# Patient Record
Sex: FEMALE | Race: BLACK OR AFRICAN AMERICAN | NOT HISPANIC OR LATINO | Employment: UNEMPLOYED | URBAN - METROPOLITAN AREA
[De-identification: names, ages, dates, MRNs, and addresses within clinical notes are randomized per-mention and may not be internally consistent; named-entity substitution may affect disease eponyms.]

---

## 2023-03-07 LAB
ALANINE AMINOTRANSFERASE (SGPT) (U/L) IN SER/PLAS: 11 U/L (ref 7–45)
ALBUMIN (G/DL) IN SER/PLAS: 3.9 G/DL (ref 3.4–5)
ALKALINE PHOSPHATASE (U/L) IN SER/PLAS: 75 U/L (ref 33–136)
ANION GAP IN SER/PLAS: 12 MMOL/L (ref 10–20)
ASPARTATE AMINOTRANSFERASE (SGOT) (U/L) IN SER/PLAS: 10 U/L (ref 9–39)
BASOPHILS (10*3/UL) IN BLOOD BY AUTOMATED COUNT: 0.01 X10E9/L (ref 0–0.1)
BASOPHILS/100 LEUKOCYTES IN BLOOD BY AUTOMATED COUNT: 0.2 % (ref 0–2)
BILIRUBIN TOTAL (MG/DL) IN SER/PLAS: 0.3 MG/DL (ref 0–1.2)
CALCIUM (MG/DL) IN SER/PLAS: 9.6 MG/DL (ref 8.6–10.6)
CANCER AG 125 (U/ML) IN SERUM: 3.9 U/ML (ref 0–30.2)
CARBON DIOXIDE, TOTAL (MMOL/L) IN SER/PLAS: 25 MMOL/L (ref 21–32)
CHLORIDE (MMOL/L) IN SER/PLAS: 109 MMOL/L (ref 98–107)
CREATININE (MG/DL) IN SER/PLAS: 1.02 MG/DL (ref 0.5–1.05)
EOSINOPHILS (10*3/UL) IN BLOOD BY AUTOMATED COUNT: 0.1 X10E9/L (ref 0–0.7)
EOSINOPHILS/100 LEUKOCYTES IN BLOOD BY AUTOMATED COUNT: 1.6 % (ref 0–6)
ERYTHROCYTE DISTRIBUTION WIDTH (RATIO) BY AUTOMATED COUNT: 17 % (ref 11.5–14.5)
ERYTHROCYTE MEAN CORPUSCULAR HEMOGLOBIN CONCENTRATION (G/DL) BY AUTOMATED: 32.1 G/DL (ref 32–36)
ERYTHROCYTE MEAN CORPUSCULAR VOLUME (FL) BY AUTOMATED COUNT: 93 FL (ref 80–100)
ERYTHROCYTES (10*6/UL) IN BLOOD BY AUTOMATED COUNT: 3.34 X10E12/L (ref 4–5.2)
GFR FEMALE: 62 ML/MIN/1.73M2
GLUCOSE (MG/DL) IN SER/PLAS: 140 MG/DL (ref 74–99)
HEMATOCRIT (%) IN BLOOD BY AUTOMATED COUNT: 31.2 % (ref 36–46)
HEMOGLOBIN (G/DL) IN BLOOD: 10 G/DL (ref 12–16)
IMMATURE GRANULOCYTES/100 LEUKOCYTES IN BLOOD BY AUTOMATED COUNT: 0.5 % (ref 0–0.9)
LEUKOCYTES (10*3/UL) IN BLOOD BY AUTOMATED COUNT: 6.2 X10E9/L (ref 4.4–11.3)
LYMPHOCYTES (10*3/UL) IN BLOOD BY AUTOMATED COUNT: 2.03 X10E9/L (ref 1.2–4.8)
LYMPHOCYTES/100 LEUKOCYTES IN BLOOD BY AUTOMATED COUNT: 32.6 % (ref 13–44)
MAGNESIUM (MG/DL) IN SER/PLAS: 1.8 MG/DL (ref 1.6–2.4)
MONOCYTES (10*3/UL) IN BLOOD BY AUTOMATED COUNT: 0.39 X10E9/L (ref 0.1–1)
MONOCYTES/100 LEUKOCYTES IN BLOOD BY AUTOMATED COUNT: 6.3 % (ref 2–10)
NEUTROPHILS (10*3/UL) IN BLOOD BY AUTOMATED COUNT: 3.67 X10E9/L (ref 1.2–7.7)
NEUTROPHILS/100 LEUKOCYTES IN BLOOD BY AUTOMATED COUNT: 58.8 % (ref 40–80)
NRBC (PER 100 WBCS) BY AUTOMATED COUNT: 0 /100 WBC (ref 0–0)
PLATELETS (10*3/UL) IN BLOOD AUTOMATED COUNT: 240 X10E9/L (ref 150–450)
POTASSIUM (MMOL/L) IN SER/PLAS: 4.1 MMOL/L (ref 3.5–5.3)
PROTEIN TOTAL: 6.8 G/DL (ref 6.4–8.2)
SODIUM (MMOL/L) IN SER/PLAS: 142 MMOL/L (ref 136–145)
UREA NITROGEN (MG/DL) IN SER/PLAS: 15 MG/DL (ref 6–23)

## 2023-04-04 LAB
ALANINE AMINOTRANSFERASE (SGPT) (U/L) IN SER/PLAS: 7 U/L (ref 7–45)
ALBUMIN (G/DL) IN SER/PLAS: 4 G/DL (ref 3.4–5)
ALKALINE PHOSPHATASE (U/L) IN SER/PLAS: 71 U/L (ref 33–136)
ANION GAP IN SER/PLAS: 12 MMOL/L (ref 10–20)
ASPARTATE AMINOTRANSFERASE (SGOT) (U/L) IN SER/PLAS: 8 U/L (ref 9–39)
BASOPHILS (10*3/UL) IN BLOOD BY AUTOMATED COUNT: 0.02 X10E9/L (ref 0–0.1)
BASOPHILS/100 LEUKOCYTES IN BLOOD BY AUTOMATED COUNT: 0.4 % (ref 0–2)
BILIRUBIN TOTAL (MG/DL) IN SER/PLAS: 0.4 MG/DL (ref 0–1.2)
CALCIUM (MG/DL) IN SER/PLAS: 9.1 MG/DL (ref 8.6–10.6)
CANCER AG 125 (U/ML) IN SERUM: 5.2 U/ML (ref 0–30.2)
CARBON DIOXIDE, TOTAL (MMOL/L) IN SER/PLAS: 26 MMOL/L (ref 21–32)
CHLORIDE (MMOL/L) IN SER/PLAS: 105 MMOL/L (ref 98–107)
CHOLESTEROL (MG/DL) IN SER/PLAS: 134 MG/DL (ref 0–199)
CHOLESTEROL IN HDL (MG/DL) IN SER/PLAS: 30.6 MG/DL
CHOLESTEROL/HDL RATIO: 4.4
CREATININE (MG/DL) IN SER/PLAS: 0.93 MG/DL (ref 0.5–1.05)
EOSINOPHILS (10*3/UL) IN BLOOD BY AUTOMATED COUNT: 0.06 X10E9/L (ref 0–0.7)
EOSINOPHILS/100 LEUKOCYTES IN BLOOD BY AUTOMATED COUNT: 1.2 % (ref 0–6)
ERYTHROCYTE DISTRIBUTION WIDTH (RATIO) BY AUTOMATED COUNT: 17.1 % (ref 11.5–14.5)
ERYTHROCYTE MEAN CORPUSCULAR HEMOGLOBIN CONCENTRATION (G/DL) BY AUTOMATED: 31.1 G/DL (ref 32–36)
ERYTHROCYTE MEAN CORPUSCULAR VOLUME (FL) BY AUTOMATED COUNT: 94 FL (ref 80–100)
ERYTHROCYTES (10*6/UL) IN BLOOD BY AUTOMATED COUNT: 3.56 X10E12/L (ref 4–5.2)
ESTIMATED AVERAGE GLUCOSE FOR HBA1C: 137 MG/DL
GFR FEMALE: 69 ML/MIN/1.73M2
GLUCOSE (MG/DL) IN SER/PLAS: 111 MG/DL (ref 74–99)
HEMATOCRIT (%) IN BLOOD BY AUTOMATED COUNT: 33.4 % (ref 36–46)
HEMOGLOBIN (G/DL) IN BLOOD: 10.4 G/DL (ref 12–16)
HEMOGLOBIN A1C/HEMOGLOBIN TOTAL IN BLOOD: 6.4 %
IMMATURE GRANULOCYTES/100 LEUKOCYTES IN BLOOD BY AUTOMATED COUNT: 0.6 % (ref 0–0.9)
LDL: 61 MG/DL (ref 0–99)
LEUKOCYTES (10*3/UL) IN BLOOD BY AUTOMATED COUNT: 5.2 X10E9/L (ref 4.4–11.3)
LYMPHOCYTES (10*3/UL) IN BLOOD BY AUTOMATED COUNT: 1.65 X10E9/L (ref 1.2–4.8)
LYMPHOCYTES/100 LEUKOCYTES IN BLOOD BY AUTOMATED COUNT: 31.8 % (ref 13–44)
MAGNESIUM (MG/DL) IN SER/PLAS: 1.91 MG/DL (ref 1.6–2.4)
MONOCYTES (10*3/UL) IN BLOOD BY AUTOMATED COUNT: 0.3 X10E9/L (ref 0.1–1)
MONOCYTES/100 LEUKOCYTES IN BLOOD BY AUTOMATED COUNT: 5.8 % (ref 2–10)
NEUTROPHILS (10*3/UL) IN BLOOD BY AUTOMATED COUNT: 3.13 X10E9/L (ref 1.2–7.7)
NEUTROPHILS/100 LEUKOCYTES IN BLOOD BY AUTOMATED COUNT: 60.2 % (ref 40–80)
NON HDL CHOLESTEROL: 103 MG/DL
NRBC (PER 100 WBCS) BY AUTOMATED COUNT: 0 /100 WBC (ref 0–0)
PLATELETS (10*3/UL) IN BLOOD AUTOMATED COUNT: 253 X10E9/L (ref 150–450)
POTASSIUM (MMOL/L) IN SER/PLAS: 4.3 MMOL/L (ref 3.5–5.3)
PROTEIN TOTAL: 6.8 G/DL (ref 6.4–8.2)
SODIUM (MMOL/L) IN SER/PLAS: 139 MMOL/L (ref 136–145)
TRIGLYCERIDE (MG/DL) IN SER/PLAS: 213 MG/DL (ref 0–149)
UREA NITROGEN (MG/DL) IN SER/PLAS: 7 MG/DL (ref 6–23)
VLDL: 43 MG/DL (ref 0–40)

## 2023-05-03 LAB
ALANINE AMINOTRANSFERASE (SGPT) (U/L) IN SER/PLAS: 7 U/L (ref 7–45)
ALBUMIN (G/DL) IN SER/PLAS: 4.2 G/DL (ref 3.4–5)
ALKALINE PHOSPHATASE (U/L) IN SER/PLAS: 73 U/L (ref 33–136)
ANION GAP IN SER/PLAS: 12 MMOL/L (ref 10–20)
ASPARTATE AMINOTRANSFERASE (SGOT) (U/L) IN SER/PLAS: 10 U/L (ref 9–39)
BASOPHILS (10*3/UL) IN BLOOD BY AUTOMATED COUNT: 0.01 X10E9/L (ref 0–0.1)
BASOPHILS/100 LEUKOCYTES IN BLOOD BY AUTOMATED COUNT: 0.2 % (ref 0–2)
BILIRUBIN TOTAL (MG/DL) IN SER/PLAS: 0.4 MG/DL (ref 0–1.2)
CALCIUM (MG/DL) IN SER/PLAS: 9.8 MG/DL (ref 8.6–10.6)
CANCER AG 125 (U/ML) IN SERUM: 4.1 U/ML (ref 0–30.2)
CARBON DIOXIDE, TOTAL (MMOL/L) IN SER/PLAS: 25 MMOL/L (ref 21–32)
CHLORIDE (MMOL/L) IN SER/PLAS: 108 MMOL/L (ref 98–107)
CREATININE (MG/DL) IN SER/PLAS: 0.95 MG/DL (ref 0.5–1.05)
EOSINOPHILS (10*3/UL) IN BLOOD BY AUTOMATED COUNT: 0.07 X10E9/L (ref 0–0.7)
EOSINOPHILS/100 LEUKOCYTES IN BLOOD BY AUTOMATED COUNT: 1.3 % (ref 0–6)
ERYTHROCYTE DISTRIBUTION WIDTH (RATIO) BY AUTOMATED COUNT: 16.7 % (ref 11.5–14.5)
ERYTHROCYTE MEAN CORPUSCULAR HEMOGLOBIN CONCENTRATION (G/DL) BY AUTOMATED: 32.3 G/DL (ref 32–36)
ERYTHROCYTE MEAN CORPUSCULAR VOLUME (FL) BY AUTOMATED COUNT: 91 FL (ref 80–100)
ERYTHROCYTES (10*6/UL) IN BLOOD BY AUTOMATED COUNT: 3.6 X10E12/L (ref 4–5.2)
GFR FEMALE: 67 ML/MIN/1.73M2
GLUCOSE (MG/DL) IN SER/PLAS: 128 MG/DL (ref 74–99)
HEMATOCRIT (%) IN BLOOD BY AUTOMATED COUNT: 32.8 % (ref 36–46)
HEMOGLOBIN (G/DL) IN BLOOD: 10.6 G/DL (ref 12–16)
IMMATURE GRANULOCYTES/100 LEUKOCYTES IN BLOOD BY AUTOMATED COUNT: 0.4 % (ref 0–0.9)
LEUKOCYTES (10*3/UL) IN BLOOD BY AUTOMATED COUNT: 5.3 X10E9/L (ref 4.4–11.3)
LYMPHOCYTES (10*3/UL) IN BLOOD BY AUTOMATED COUNT: 1.64 X10E9/L (ref 1.2–4.8)
LYMPHOCYTES/100 LEUKOCYTES IN BLOOD BY AUTOMATED COUNT: 31.2 % (ref 13–44)
MAGNESIUM (MG/DL) IN SER/PLAS: 2.04 MG/DL (ref 1.6–2.4)
MONOCYTES (10*3/UL) IN BLOOD BY AUTOMATED COUNT: 0.28 X10E9/L (ref 0.1–1)
MONOCYTES/100 LEUKOCYTES IN BLOOD BY AUTOMATED COUNT: 5.3 % (ref 2–10)
NEUTROPHILS (10*3/UL) IN BLOOD BY AUTOMATED COUNT: 3.24 X10E9/L (ref 1.2–7.7)
NEUTROPHILS/100 LEUKOCYTES IN BLOOD BY AUTOMATED COUNT: 61.6 % (ref 40–80)
NRBC (PER 100 WBCS) BY AUTOMATED COUNT: 0 /100 WBC (ref 0–0)
PLATELETS (10*3/UL) IN BLOOD AUTOMATED COUNT: 261 X10E9/L (ref 150–450)
POTASSIUM (MMOL/L) IN SER/PLAS: 4.3 MMOL/L (ref 3.5–5.3)
PROTEIN TOTAL: 7.2 G/DL (ref 6.4–8.2)
SODIUM (MMOL/L) IN SER/PLAS: 141 MMOL/L (ref 136–145)
UREA NITROGEN (MG/DL) IN SER/PLAS: 12 MG/DL (ref 6–23)

## 2023-05-30 LAB
ALANINE AMINOTRANSFERASE (SGPT) (U/L) IN SER/PLAS: 8 U/L (ref 7–45)
ALBUMIN (G/DL) IN SER/PLAS: 4.2 G/DL (ref 3.4–5)
ALKALINE PHOSPHATASE (U/L) IN SER/PLAS: 75 U/L (ref 33–136)
ANION GAP IN SER/PLAS: 7 MMOL/L (ref 10–20)
ASPARTATE AMINOTRANSFERASE (SGOT) (U/L) IN SER/PLAS: 11 U/L (ref 9–39)
BASOPHILS (10*3/UL) IN BLOOD BY AUTOMATED COUNT: 0.02 X10E9/L (ref 0–0.1)
BASOPHILS/100 LEUKOCYTES IN BLOOD BY AUTOMATED COUNT: 0.4 % (ref 0–2)
BILIRUBIN TOTAL (MG/DL) IN SER/PLAS: 0.4 MG/DL (ref 0–1.2)
CALCIUM (MG/DL) IN SER/PLAS: 9.8 MG/DL (ref 8.6–10.6)
CANCER AG 125 (U/ML) IN SERUM: 5.6 U/ML (ref 0–30.2)
CARBON DIOXIDE, TOTAL (MMOL/L) IN SER/PLAS: 27 MMOL/L (ref 21–32)
CHLORIDE (MMOL/L) IN SER/PLAS: 109 MMOL/L (ref 98–107)
CREATININE (MG/DL) IN SER/PLAS: 0.95 MG/DL (ref 0.5–1.05)
EOSINOPHILS (10*3/UL) IN BLOOD BY AUTOMATED COUNT: 0.08 X10E9/L (ref 0–0.7)
EOSINOPHILS/100 LEUKOCYTES IN BLOOD BY AUTOMATED COUNT: 1.4 % (ref 0–6)
ERYTHROCYTE DISTRIBUTION WIDTH (RATIO) BY AUTOMATED COUNT: 16.9 % (ref 11.5–14.5)
ERYTHROCYTE MEAN CORPUSCULAR HEMOGLOBIN CONCENTRATION (G/DL) BY AUTOMATED: 32.6 G/DL (ref 32–36)
ERYTHROCYTE MEAN CORPUSCULAR VOLUME (FL) BY AUTOMATED COUNT: 90 FL (ref 80–100)
ERYTHROCYTES (10*6/UL) IN BLOOD BY AUTOMATED COUNT: 3.45 X10E12/L (ref 4–5.2)
GFR FEMALE: 67 ML/MIN/1.73M2
GLUCOSE (MG/DL) IN SER/PLAS: 114 MG/DL (ref 74–99)
HEMATOCRIT (%) IN BLOOD BY AUTOMATED COUNT: 31 % (ref 36–46)
HEMOGLOBIN (G/DL) IN BLOOD: 10.1 G/DL (ref 12–16)
IMMATURE GRANULOCYTES/100 LEUKOCYTES IN BLOOD BY AUTOMATED COUNT: 0.4 % (ref 0–0.9)
LEUKOCYTES (10*3/UL) IN BLOOD BY AUTOMATED COUNT: 5.6 X10E9/L (ref 4.4–11.3)
LYMPHOCYTES (10*3/UL) IN BLOOD BY AUTOMATED COUNT: 2.49 X10E9/L (ref 1.2–4.8)
LYMPHOCYTES/100 LEUKOCYTES IN BLOOD BY AUTOMATED COUNT: 44.4 % (ref 13–44)
MAGNESIUM (MG/DL) IN SER/PLAS: 1.81 MG/DL (ref 1.6–2.4)
MONOCYTES (10*3/UL) IN BLOOD BY AUTOMATED COUNT: 0.4 X10E9/L (ref 0.1–1)
MONOCYTES/100 LEUKOCYTES IN BLOOD BY AUTOMATED COUNT: 7.1 % (ref 2–10)
NEUTROPHILS (10*3/UL) IN BLOOD BY AUTOMATED COUNT: 2.6 X10E9/L (ref 1.2–7.7)
NEUTROPHILS/100 LEUKOCYTES IN BLOOD BY AUTOMATED COUNT: 46.3 % (ref 40–80)
NRBC (PER 100 WBCS) BY AUTOMATED COUNT: 0 /100 WBC (ref 0–0)
PLATELETS (10*3/UL) IN BLOOD AUTOMATED COUNT: 261 X10E9/L (ref 150–450)
POTASSIUM (MMOL/L) IN SER/PLAS: 3.8 MMOL/L (ref 3.5–5.3)
PROTEIN TOTAL: 7.1 G/DL (ref 6.4–8.2)
SODIUM (MMOL/L) IN SER/PLAS: 139 MMOL/L (ref 136–145)
UREA NITROGEN (MG/DL) IN SER/PLAS: 14 MG/DL (ref 6–23)

## 2023-06-27 LAB
ALANINE AMINOTRANSFERASE (SGPT) (U/L) IN SER/PLAS: 12 U/L (ref 7–45)
ALBUMIN (G/DL) IN SER/PLAS: 4 G/DL (ref 3.4–5)
ALKALINE PHOSPHATASE (U/L) IN SER/PLAS: 71 U/L (ref 33–136)
ANION GAP IN SER/PLAS: 13 MMOL/L (ref 10–20)
ASPARTATE AMINOTRANSFERASE (SGOT) (U/L) IN SER/PLAS: 14 U/L (ref 9–39)
BASOPHILS (10*3/UL) IN BLOOD BY AUTOMATED COUNT: 0.03 X10E9/L (ref 0–0.1)
BASOPHILS/100 LEUKOCYTES IN BLOOD BY AUTOMATED COUNT: 0.6 % (ref 0–2)
BILIRUBIN TOTAL (MG/DL) IN SER/PLAS: 0.4 MG/DL (ref 0–1.2)
CALCIUM (MG/DL) IN SER/PLAS: 9.6 MG/DL (ref 8.6–10.6)
CANCER AG 125 (U/ML) IN SERUM: 6 U/ML (ref 0–30.2)
CARBON DIOXIDE, TOTAL (MMOL/L) IN SER/PLAS: 25 MMOL/L (ref 21–32)
CHLORIDE (MMOL/L) IN SER/PLAS: 108 MMOL/L (ref 98–107)
CREATININE (MG/DL) IN SER/PLAS: 0.91 MG/DL (ref 0.5–1.05)
EOSINOPHILS (10*3/UL) IN BLOOD BY AUTOMATED COUNT: 0.09 X10E9/L (ref 0–0.7)
EOSINOPHILS/100 LEUKOCYTES IN BLOOD BY AUTOMATED COUNT: 1.8 % (ref 0–6)
ERYTHROCYTE DISTRIBUTION WIDTH (RATIO) BY AUTOMATED COUNT: 17.4 % (ref 11.5–14.5)
ERYTHROCYTE MEAN CORPUSCULAR HEMOGLOBIN CONCENTRATION (G/DL) BY AUTOMATED: 31.3 G/DL (ref 32–36)
ERYTHROCYTE MEAN CORPUSCULAR VOLUME (FL) BY AUTOMATED COUNT: 92 FL (ref 80–100)
ERYTHROCYTES (10*6/UL) IN BLOOD BY AUTOMATED COUNT: 3.64 X10E12/L (ref 4–5.2)
GFR FEMALE: 70 ML/MIN/1.73M2
GLUCOSE (MG/DL) IN SER/PLAS: 101 MG/DL (ref 74–99)
HEMATOCRIT (%) IN BLOOD BY AUTOMATED COUNT: 33.5 % (ref 36–46)
HEMOGLOBIN (G/DL) IN BLOOD: 10.5 G/DL (ref 12–16)
IMMATURE GRANULOCYTES/100 LEUKOCYTES IN BLOOD BY AUTOMATED COUNT: 0.4 % (ref 0–0.9)
LEUKOCYTES (10*3/UL) IN BLOOD BY AUTOMATED COUNT: 5 X10E9/L (ref 4.4–11.3)
LYMPHOCYTES (10*3/UL) IN BLOOD BY AUTOMATED COUNT: 1.4 X10E9/L (ref 1.2–4.8)
LYMPHOCYTES/100 LEUKOCYTES IN BLOOD BY AUTOMATED COUNT: 28.1 % (ref 13–44)
MAGNESIUM (MG/DL) IN SER/PLAS: 1.9 MG/DL (ref 1.6–2.4)
MONOCYTES (10*3/UL) IN BLOOD BY AUTOMATED COUNT: 0.42 X10E9/L (ref 0.1–1)
MONOCYTES/100 LEUKOCYTES IN BLOOD BY AUTOMATED COUNT: 8.4 % (ref 2–10)
NEUTROPHILS (10*3/UL) IN BLOOD BY AUTOMATED COUNT: 3.03 X10E9/L (ref 1.2–7.7)
NEUTROPHILS/100 LEUKOCYTES IN BLOOD BY AUTOMATED COUNT: 60.7 % (ref 40–80)
NRBC (PER 100 WBCS) BY AUTOMATED COUNT: 0 /100 WBC (ref 0–0)
PLATELETS (10*3/UL) IN BLOOD AUTOMATED COUNT: 206 X10E9/L (ref 150–450)
POTASSIUM (MMOL/L) IN SER/PLAS: 4.1 MMOL/L (ref 3.5–5.3)
PROTEIN TOTAL: 7.4 G/DL (ref 6.4–8.2)
SODIUM (MMOL/L) IN SER/PLAS: 142 MMOL/L (ref 136–145)
UREA NITROGEN (MG/DL) IN SER/PLAS: 8 MG/DL (ref 6–23)

## 2023-08-25 ENCOUNTER — HOSPITAL ENCOUNTER (OUTPATIENT)
Dept: DATA CONVERSION | Facility: HOSPITAL | Age: 63
Discharge: HOME | End: 2023-08-25

## 2023-08-25 DIAGNOSIS — C56.9 MALIGNANT NEOPLASM OF UNSPECIFIED OVARY (MULTI): ICD-10-CM

## 2023-08-25 DIAGNOSIS — Z51.11 ENCOUNTER FOR ANTINEOPLASTIC CHEMOTHERAPY: ICD-10-CM

## 2023-08-25 LAB
ALBUMIN SERPL-MCNC: 3.6 GM/DL (ref 3.5–5)
ALBUMIN/GLOB SERPL: 1.1 RATIO (ref 1.5–3)
ALP BLD-CCNC: 75 U/L (ref 35–125)
ALT SERPL-CCNC: 10 U/L (ref 5–40)
ANION GAP SERPL CALCULATED.3IONS-SCNC: 12 MMOL/L (ref 0–19)
AST SERPL-CCNC: 11 U/L (ref 5–40)
BASOPHILS # BLD AUTO: 0.02 K/UL (ref 0–0.22)
BASOPHILS NFR BLD AUTO: 0.3 % (ref 0–1)
BILIRUB SERPL-MCNC: 0.2 MG/DL (ref 0.1–1.2)
BUN SERPL-MCNC: 9 MG/DL (ref 8–25)
BUN/CREAT SERPL: 10 RATIO (ref 8–21)
CALCIUM SERPL-MCNC: 9 MG/DL (ref 8.5–10.4)
CANCER AG125 SERPL-ACNC: 8.6 U/ML (ref 0–35)
CHLORIDE SERPL-SCNC: 108 MMOL/L (ref 97–107)
CO2 SERPL-SCNC: 22 MMOL/L (ref 24–31)
CREAT SERPL-MCNC: 0.9 MG/DL (ref 0.4–1.6)
DEPRECATED RDW RBC AUTO: 56.5 FL (ref 37–54)
DIFFERENTIAL METHOD BLD: ABNORMAL
EOSINOPHIL # BLD AUTO: 0.13 K/UL (ref 0–0.45)
EOSINOPHIL NFR BLD: 2.2 % (ref 0–3)
ERYTHROCYTE [DISTWIDTH] IN BLOOD BY AUTOMATED COUNT: 17.9 % (ref 11.7–15)
GFR SERPL CREATININE-BSD FRML MDRD: 71 ML/MIN/1.73 M2
GLOBULIN SER-MCNC: 3.4 G/DL (ref 1.9–3.7)
GLUCOSE SERPL-MCNC: 143 MG/DL (ref 65–99)
HCT VFR BLD AUTO: 30.4 % (ref 36–44)
HGB BLD-MCNC: 9.9 GM/DL (ref 12–15)
IMM GRANULOCYTES # BLD AUTO: 0.03 K/UL (ref 0–0.1)
LYMPHOCYTES # BLD AUTO: 2.42 K/UL (ref 1.2–3.2)
LYMPHOCYTES NFR BLD MANUAL: 40.5 % (ref 20–40)
MAGNESIUM SERPL-MCNC: 1.9 MG/DL (ref 1.6–3.1)
MCH RBC QN AUTO: 28.1 PG (ref 26–34)
MCHC RBC AUTO-ENTMCNC: 32.6 % (ref 31–37)
MCV RBC AUTO: 86.4 FL (ref 80–100)
MONOCYTES # BLD AUTO: 0.37 K/UL (ref 0–0.8)
MONOCYTES NFR BLD MANUAL: 6.2 % (ref 0–8)
NEUTROPHILS # BLD AUTO: 3.01 K/UL
NEUTROPHILS # BLD AUTO: 3.01 K/UL (ref 1.8–7.7)
NEUTROPHILS.IMMATURE NFR BLD: 0.5 % (ref 0–1)
NEUTS SEG NFR BLD: 50.3 % (ref 50–70)
NRBC BLD-RTO: 0 /100 WBC
PLATELET # BLD AUTO: 235 K/UL (ref 150–450)
PMV BLD AUTO: 9.4 CU (ref 7–12.6)
POTASSIUM SERPL-SCNC: 4.1 MMOL/L (ref 3.4–5.1)
PROT SERPL-MCNC: 7 G/DL (ref 5.9–7.9)
RBC # BLD AUTO: 3.52 M/UL (ref 4–4.9)
SODIUM SERPL-SCNC: 142 MMOL/L (ref 133–145)
WBC # BLD AUTO: 6 K/UL (ref 4.5–11)

## 2023-09-27 LAB
ALANINE AMINOTRANSFERASE (SGPT) (U/L) IN SER/PLAS: 8 U/L (ref 7–45)
ALBUMIN (G/DL) IN SER/PLAS: 4 G/DL (ref 3.4–5)
ALKALINE PHOSPHATASE (U/L) IN SER/PLAS: 65 U/L (ref 33–136)
ANION GAP IN SER/PLAS: 8 MMOL/L (ref 10–20)
ASPARTATE AMINOTRANSFERASE (SGOT) (U/L) IN SER/PLAS: 11 U/L (ref 9–39)
BASOPHILS (10*3/UL) IN BLOOD BY AUTOMATED COUNT: 0.01 X10E9/L (ref 0–0.1)
BASOPHILS/100 LEUKOCYTES IN BLOOD BY AUTOMATED COUNT: 0.2 % (ref 0–2)
BILIRUBIN TOTAL (MG/DL) IN SER/PLAS: 0.3 MG/DL (ref 0–1.2)
CALCIUM (MG/DL) IN SER/PLAS: 9.3 MG/DL (ref 8.6–10.6)
CANCER AG 125 (U/ML) IN SERUM: 7.3 U/ML (ref 0–30.2)
CARBON DIOXIDE, TOTAL (MMOL/L) IN SER/PLAS: 29 MMOL/L (ref 21–32)
CHLORIDE (MMOL/L) IN SER/PLAS: 109 MMOL/L (ref 98–107)
CREATININE (MG/DL) IN SER/PLAS: 1.03 MG/DL (ref 0.5–1.05)
EOSINOPHILS (10*3/UL) IN BLOOD BY AUTOMATED COUNT: 0.11 X10E9/L (ref 0–0.7)
EOSINOPHILS/100 LEUKOCYTES IN BLOOD BY AUTOMATED COUNT: 2.2 % (ref 0–6)
ERYTHROCYTE DISTRIBUTION WIDTH (RATIO) BY AUTOMATED COUNT: 18.6 % (ref 11.5–14.5)
ERYTHROCYTE MEAN CORPUSCULAR HEMOGLOBIN CONCENTRATION (G/DL) BY AUTOMATED: 31.3 G/DL (ref 32–36)
ERYTHROCYTE MEAN CORPUSCULAR VOLUME (FL) BY AUTOMATED COUNT: 89 FL (ref 80–100)
ERYTHROCYTES (10*6/UL) IN BLOOD BY AUTOMATED COUNT: 3.76 X10E12/L (ref 4–5.2)
GFR FEMALE: 61 ML/MIN/1.73M2
GLUCOSE (MG/DL) IN SER/PLAS: 151 MG/DL (ref 74–99)
HEMATOCRIT (%) IN BLOOD BY AUTOMATED COUNT: 33.5 % (ref 36–46)
HEMOGLOBIN (G/DL) IN BLOOD: 10.5 G/DL (ref 12–16)
IMMATURE GRANULOCYTES/100 LEUKOCYTES IN BLOOD BY AUTOMATED COUNT: 0.2 % (ref 0–0.9)
LEUKOCYTES (10*3/UL) IN BLOOD BY AUTOMATED COUNT: 4.9 X10E9/L (ref 4.4–11.3)
LYMPHOCYTES (10*3/UL) IN BLOOD BY AUTOMATED COUNT: 1.66 X10E9/L (ref 1.2–4.8)
LYMPHOCYTES/100 LEUKOCYTES IN BLOOD BY AUTOMATED COUNT: 33.9 % (ref 13–44)
MAGNESIUM (MG/DL) IN SER/PLAS: 1.89 MG/DL (ref 1.6–2.4)
MONOCYTES (10*3/UL) IN BLOOD BY AUTOMATED COUNT: 0.29 X10E9/L (ref 0.1–1)
MONOCYTES/100 LEUKOCYTES IN BLOOD BY AUTOMATED COUNT: 5.9 % (ref 2–10)
NEUTROPHILS (10*3/UL) IN BLOOD BY AUTOMATED COUNT: 2.82 X10E9/L (ref 1.2–7.7)
NEUTROPHILS/100 LEUKOCYTES IN BLOOD BY AUTOMATED COUNT: 57.6 % (ref 40–80)
NRBC (PER 100 WBCS) BY AUTOMATED COUNT: 0 /100 WBC (ref 0–0)
PLATELETS (10*3/UL) IN BLOOD AUTOMATED COUNT: 235 X10E9/L (ref 150–450)
POTASSIUM (MMOL/L) IN SER/PLAS: 4.2 MMOL/L (ref 3.5–5.3)
PROTEIN TOTAL: 7 G/DL (ref 6.4–8.2)
SODIUM (MMOL/L) IN SER/PLAS: 142 MMOL/L (ref 136–145)
UREA NITROGEN (MG/DL) IN SER/PLAS: 9 MG/DL (ref 6–23)

## 2023-10-11 PROBLEM — R35.0 URINE FREQUENCY: Status: ACTIVE | Noted: 2023-10-11

## 2023-10-11 PROBLEM — E66.01 MORBID OBESITY WITH BMI OF 40.0-44.9, ADULT (MULTI): Status: ACTIVE | Noted: 2023-10-11

## 2023-10-11 PROBLEM — E83.42 HYPOMAGNESEMIA: Status: ACTIVE | Noted: 2023-10-11

## 2023-10-11 PROBLEM — J44.9 COPD (CHRONIC OBSTRUCTIVE PULMONARY DISEASE) (MULTI): Status: ACTIVE | Noted: 2023-10-11

## 2023-10-11 PROBLEM — F17.200 NICOTINE ADDICTION: Status: ACTIVE | Noted: 2023-10-11

## 2023-10-11 PROBLEM — N39.3 STRESS INCONTINENCE, FEMALE: Status: ACTIVE | Noted: 2023-10-11

## 2023-10-11 PROBLEM — R79.9 ABNORMAL BLOOD CHEMISTRY: Status: ACTIVE | Noted: 2023-10-11

## 2023-10-11 PROBLEM — F32.A DEPRESSION: Status: ACTIVE | Noted: 2023-10-11

## 2023-10-11 PROBLEM — C56.9 MALIGNANT NEOPLASM OF OVARY (MULTI): Status: ACTIVE | Noted: 2023-10-11

## 2023-10-11 PROBLEM — N17.9 AKI (ACUTE KIDNEY INJURY) (CMS-HCC): Status: ACTIVE | Noted: 2023-10-11

## 2023-10-11 PROBLEM — R06.00 DYSPNEA: Status: ACTIVE | Noted: 2023-10-11

## 2023-10-11 PROBLEM — E87.6 HYPOKALEMIA: Status: ACTIVE | Noted: 2023-10-11

## 2023-10-11 PROBLEM — R91.8 LUNG NODULE, MULTIPLE: Status: ACTIVE | Noted: 2023-10-11

## 2023-10-11 PROBLEM — M25.512 BILATERAL SHOULDER PAIN: Status: ACTIVE | Noted: 2023-10-11

## 2023-10-11 PROBLEM — M79.604 RIGHT LEG PAIN: Status: ACTIVE | Noted: 2023-10-11

## 2023-10-11 PROBLEM — M62.89 PELVIC FLOOR DYSFUNCTION: Status: ACTIVE | Noted: 2023-10-11

## 2023-10-11 PROBLEM — R51.9 HEADACHE: Status: ACTIVE | Noted: 2023-10-11

## 2023-10-11 PROBLEM — G47.33 OBSTRUCTIVE SLEEP APNEA SYNDROME IN ADULT: Status: ACTIVE | Noted: 2023-10-11

## 2023-10-11 PROBLEM — N81.89 PELVIC FLOOR WEAKNESS: Status: ACTIVE | Noted: 2023-10-11

## 2023-10-11 PROBLEM — N39.41 URGE INCONTINENCE: Status: ACTIVE | Noted: 2023-10-11

## 2023-10-11 PROBLEM — E78.5 HYPERLIPIDEMIA: Status: ACTIVE | Noted: 2023-10-11

## 2023-10-11 PROBLEM — M25.511 BILATERAL SHOULDER PAIN: Status: ACTIVE | Noted: 2023-10-11

## 2023-10-11 PROBLEM — R93.89 ABNORMAL CHEST CT: Status: ACTIVE | Noted: 2023-10-11

## 2023-10-11 RX ORDER — TOPIRAMATE 100 MG/1
TABLET, FILM COATED ORAL
COMMUNITY
End: 2023-11-08 | Stop reason: DRUGHIGH

## 2023-10-11 RX ORDER — ACETAMINOPHEN 500 MG
1000 TABLET ORAL EVERY 6 HOURS PRN
COMMUNITY
Start: 2022-03-11

## 2023-10-11 RX ORDER — DOCUSATE SODIUM 100 MG/1
CAPSULE, LIQUID FILLED ORAL
COMMUNITY
Start: 2022-03-10

## 2023-10-11 RX ORDER — DULOXETIN HYDROCHLORIDE 30 MG/1
30 CAPSULE, DELAYED RELEASE ORAL NIGHTLY
COMMUNITY
Start: 2021-12-01 | End: 2024-03-07 | Stop reason: SDUPTHER

## 2023-10-11 RX ORDER — BENZONATATE 200 MG/1
CAPSULE ORAL
COMMUNITY
Start: 2022-01-11

## 2023-10-11 RX ORDER — IBUPROFEN 600 MG/1
600 TABLET ORAL EVERY 6 HOURS PRN
COMMUNITY
Start: 2022-03-22

## 2023-10-11 RX ORDER — GABAPENTIN 300 MG/1
CAPSULE ORAL
COMMUNITY
Start: 2021-12-01 | End: 2024-01-03 | Stop reason: WASHOUT

## 2023-10-11 RX ORDER — DULOXETIN HYDROCHLORIDE 60 MG/1
60 CAPSULE, DELAYED RELEASE ORAL NIGHTLY
COMMUNITY
Start: 2021-12-01 | End: 2024-03-07 | Stop reason: SDUPTHER

## 2023-10-11 RX ORDER — VARENICLINE TARTRATE 0.5 MG/1
TABLET, FILM COATED ORAL
COMMUNITY

## 2023-10-11 RX ORDER — FLUTICASONE PROPIONATE 50 MCG
2 SPRAY, SUSPENSION (ML) NASAL DAILY
COMMUNITY
End: 2024-05-24

## 2023-10-11 RX ORDER — ALBUTEROL SULFATE 90 UG/1
1-2 INHALANT RESPIRATORY (INHALATION)
COMMUNITY
Start: 2021-12-01

## 2023-10-11 RX ORDER — TRAMADOL HYDROCHLORIDE 50 MG/1
50 TABLET ORAL EVERY 8 HOURS PRN
COMMUNITY
Start: 2022-03-11 | End: 2024-06-05 | Stop reason: WASHOUT

## 2023-10-11 RX ORDER — ZINC SULFATE 50(220)MG
50 CAPSULE ORAL 3 TIMES DAILY
COMMUNITY
Start: 2021-12-01 | End: 2024-03-07 | Stop reason: SDUPTHER

## 2023-10-11 RX ORDER — POLYETHYLENE GLYCOL 3350 17 G/17G
POWDER, FOR SOLUTION ORAL
COMMUNITY
Start: 2022-07-08

## 2023-10-11 RX ORDER — TIOTROPIUM BROMIDE INHALATION SPRAY 3.12 UG/1
2 SPRAY, METERED RESPIRATORY (INHALATION) DAILY
COMMUNITY
Start: 2022-06-20

## 2023-10-11 RX ORDER — TOPIRAMATE 50 MG/1
TABLET, FILM COATED ORAL
COMMUNITY
End: 2023-11-08 | Stop reason: DRUGHIGH

## 2023-10-11 RX ORDER — PREGABALIN 200 MG/1
CAPSULE ORAL
COMMUNITY
Start: 2022-03-11 | End: 2024-02-08 | Stop reason: SDUPTHER

## 2023-10-11 RX ORDER — LANOLIN ALCOHOL/MO/W.PET/CERES
2 CREAM (GRAM) TOPICAL 2 TIMES DAILY
COMMUNITY
Start: 2022-04-26

## 2023-10-11 RX ORDER — TRAZODONE HYDROCHLORIDE 100 MG/1
100 TABLET ORAL NIGHTLY
COMMUNITY
Start: 2022-03-11 | End: 2023-11-08 | Stop reason: SDUPTHER

## 2023-10-12 ENCOUNTER — TREATMENT (OUTPATIENT)
Dept: PHYSICAL THERAPY | Facility: CLINIC | Age: 63
End: 2023-10-12
Payer: MEDICAID

## 2023-10-12 ENCOUNTER — SPECIALTY PHARMACY (OUTPATIENT)
Dept: PHARMACY | Facility: HOSPITAL | Age: 63
End: 2023-10-12

## 2023-10-12 VITALS — BODY MASS INDEX: 43.09 KG/M2 | HEIGHT: 65 IN | WEIGHT: 258.6 LBS

## 2023-10-12 DIAGNOSIS — N39.3 STRESS INCONTINENCE, FEMALE: ICD-10-CM

## 2023-10-12 DIAGNOSIS — M62.89 PELVIC FLOOR DYSFUNCTION: ICD-10-CM

## 2023-10-12 DIAGNOSIS — N39.41 URGE INCONTINENCE: Primary | ICD-10-CM

## 2023-10-12 DIAGNOSIS — C56.9 MALIGNANT NEOPLASM OF OVARY, UNSPECIFIED LATERALITY (MULTI): Primary | ICD-10-CM

## 2023-10-12 PROCEDURE — 97530 THERAPEUTIC ACTIVITIES: CPT | Mod: GP

## 2023-10-12 PROCEDURE — 97110 THERAPEUTIC EXERCISES: CPT | Mod: GP

## 2023-10-12 NOTE — PROGRESS NOTES
Physical Therapy    Physical Therapy Treatment    Patient Name: Sharmila Huerta  MRN: [id number]  Today's Date: 10/12/2023  Time Calculation  Start Time: 0300  Stop Time: 0405  Time Calculation (min): 65 min  Visit 5    Assessment:  PT Assessment  Rehab Prognosis: Good  Evaluation/Treatment Tolerance: Patient tolerated treatment wellPatient reports reduced episodes of urinary leakage and amount. Between sessions, patient admits to reduced HEP compliance, water intake, and has yet to complete bladder diary. Re-instructed and educated on the benefits of consistency with PT POC and HEP to maximize the benefits of therapy and establish lifestyle changes/behavior modifications. Patient verbalizes understanding. Despite intermittent inconsistencies, she is progressing with PT POC. See goals below. Patient would benefit from continued PT interventions to progress towards partially met/unmet goals. Insurance authorization date ends 10/15/2023. Plan to submit for additional time to complete the initial POC as described on PT evaluation (1x/week for 8-10 visits).    Plan:  Plan to submit for additional time to complete the initial POC as described on PT evaluation (1x/week for 8-10 visits).      Current Problem  1. Urge incontinence        2. Stress incontinence, female        3. Pelvic floor dysfunction            Subjective   General  Reason for Referral: pelvic floor weakness, urge incontinence  Referred By: Dr. Gomez Scheafer    Not a lot of leakage, one accident last week on way to bathroom  HEP intermittently, not daily. Not drinking as much water.  Been tired lately, slept for two days. Following up with medical providers.   Follows up with oncology 10/31/2023.  Did not complete the bladder diary. Estimates 8-10 voids/day. I used to go all day long.  Breathing helps with maintaining urge.  No leakage with cough, sneeze, or washing dishes. Does get urge with washing     Objective     Treatments:  Therapeutic exercise:  timed minutes 50, units 3 .   Hooklying PF/TA contraction with alt hip march, blue TB  Hooklying PF/TA contraction with hip adduction isometric  Hooklying PF/TA contraction with clamshell, blue TB isometric  Glute bridge with PF engagement, with addition of clamshell  Glute bridge with PB  Quadruped PF/TA alt UE reach, alt LE reach ea  Functional sit to stand with PF/TA  Not today:  Adductor stretch in supine  Therapeutic Activity x 15 minutes: Bladder retraining techniques, urinary urgency control techniques, functionally with washing dishes, getting to bathroom diaphragmatic breathing, hold 5 minutes, distraction techniques/think about something else, quick flicks. Re-encouraged patient to complete bladder diary for next appointment.        OP EDUCATION:   Consistency, daily compliance with HEP, bladder retraining techniques, urinary urgency techniques  Provided today: blue TB to update HEP    Goals:  Active       PT Problem       Improved utilization of proper diaphragmatic breathing and proper breathing patterns at rest and with effort and exertion for improved load management to decrease force and pressure on pelvic floor       Start:  10/12/23            Improved utilization of proper diaphragmatic breathing and proper breathing patterns at rest and with effort and exertion for improved load management to decrease force and pressure on pelvic floor, goal partially met  Independent with consistent utilization of urge suppression strategies and bladder retraining strategies, goal partially met  Voiding frequency of 5-8x (at most 8x) per day with void interval of 1x per 2-5 hours  Absent urine leakage with cough, household duties/washing dishes to allow patient to participate in these daily activities without use of pads, goal met  [place] and compliance with HEP and self management for home maintenance, goal partially met  Pt will report a 50% improvement on NIH-CPSI to demonstrate a reduction in symptoms  and increase quality of life

## 2023-10-12 NOTE — PROGRESS NOTES
Date: 10/12/23  Reason for call: Specialty medication re-assessment   Medication: olaparib  Disease state: ovarian cancer    Patient called to conduct re-assessment call at this time. Overall doing well. Patient is currently on Lynparza 250 mg bid. Reviewed toxicities with patient. At this time main side-effect patient has is fatigue. This is improved from when patient was on 300 mg bid. Patient reports intermittent nausea (without vomiting) but states this has not impacted ability to take medications. No missed doses reported and no issues with receiving medications. Will continue to follow. Next outreach in 3 months or sooner if clinically indication.     Time spent on patient care: 15 minutes    Ari Beltran, AmeliaD

## 2023-10-17 ENCOUNTER — PHARMACY VISIT (OUTPATIENT)
Dept: PHARMACY | Facility: CLINIC | Age: 63
End: 2023-10-17
Payer: MEDICAID

## 2023-10-17 ENCOUNTER — SPECIALTY PHARMACY (OUTPATIENT)
Dept: PHARMACY | Facility: CLINIC | Age: 63
End: 2023-10-17

## 2023-10-17 PROCEDURE — RXMED WILLOW AMBULATORY MEDICATION CHARGE

## 2023-10-31 ENCOUNTER — LAB (OUTPATIENT)
Dept: LAB | Facility: LAB | Age: 63
End: 2023-10-31
Payer: MEDICAID

## 2023-10-31 ENCOUNTER — OFFICE VISIT (OUTPATIENT)
Dept: PRIMARY CARE | Facility: CLINIC | Age: 63
End: 2023-10-31
Payer: MEDICAID

## 2023-10-31 ENCOUNTER — ANCILLARY PROCEDURE (OUTPATIENT)
Dept: RADIOLOGY | Facility: CLINIC | Age: 63
End: 2023-10-31
Payer: MEDICAID

## 2023-10-31 VITALS — WEIGHT: 257 LBS | DIASTOLIC BLOOD PRESSURE: 78 MMHG | BODY MASS INDEX: 42.25 KG/M2 | SYSTOLIC BLOOD PRESSURE: 137 MMHG

## 2023-10-31 DIAGNOSIS — C56.9 MALIGNANT NEOPLASM OF OVARY, UNSPECIFIED LATERALITY (MULTI): ICD-10-CM

## 2023-10-31 DIAGNOSIS — F32.A DEPRESSION, UNSPECIFIED DEPRESSION TYPE: ICD-10-CM

## 2023-10-31 DIAGNOSIS — C56.9 OVARIAN CANCER (MULTI): ICD-10-CM

## 2023-10-31 DIAGNOSIS — C56.9 MALIGNANT NEOPLASM OF UNSPECIFIED OVARY (MULTI): Primary | ICD-10-CM

## 2023-10-31 DIAGNOSIS — F17.210 CIGARETTE NICOTINE DEPENDENCE WITHOUT COMPLICATION: ICD-10-CM

## 2023-10-31 DIAGNOSIS — F43.21 GRIEVING: Primary | ICD-10-CM

## 2023-10-31 LAB
ALBUMIN SERPL BCP-MCNC: 3.9 G/DL (ref 3.4–5)
ALP SERPL-CCNC: 66 U/L (ref 33–136)
ALT SERPL W P-5'-P-CCNC: 10 U/L (ref 7–45)
ANION GAP SERPL CALC-SCNC: 11 MMOL/L (ref 10–20)
AST SERPL W P-5'-P-CCNC: 11 U/L (ref 9–39)
BASOPHILS # BLD AUTO: 0.02 X10*3/UL (ref 0–0.1)
BASOPHILS NFR BLD AUTO: 0.3 %
BILIRUB SERPL-MCNC: 0.4 MG/DL (ref 0–1.2)
BUN SERPL-MCNC: 9 MG/DL (ref 6–23)
CALCIUM SERPL-MCNC: 9.6 MG/DL (ref 8.6–10.6)
CANCER AG125 SERPL-ACNC: 5.6 U/ML (ref 0–30.2)
CHLORIDE SERPL-SCNC: 107 MMOL/L (ref 98–107)
CO2 SERPL-SCNC: 28 MMOL/L (ref 21–32)
CREAT SERPL-MCNC: 0.92 MG/DL (ref 0.5–1.05)
EOSINOPHIL # BLD AUTO: 0.08 X10*3/UL (ref 0–0.7)
EOSINOPHIL NFR BLD AUTO: 1.4 %
ERYTHROCYTE [DISTWIDTH] IN BLOOD BY AUTOMATED COUNT: 18 % (ref 11.5–14.5)
GFR SERPL CREATININE-BSD FRML MDRD: 70 ML/MIN/1.73M*2
GLUCOSE SERPL-MCNC: 122 MG/DL (ref 74–99)
HCT VFR BLD AUTO: 32.7 % (ref 36–46)
HGB BLD-MCNC: 10.6 G/DL (ref 12–16)
IMM GRANULOCYTES # BLD AUTO: 0.02 X10*3/UL (ref 0–0.7)
IMM GRANULOCYTES NFR BLD AUTO: 0.3 % (ref 0–0.9)
LYMPHOCYTES # BLD AUTO: 2.01 X10*3/UL (ref 1.2–4.8)
LYMPHOCYTES NFR BLD AUTO: 34.1 %
MAGNESIUM SERPL-MCNC: 1.79 MG/DL (ref 1.6–2.4)
MCH RBC QN AUTO: 28.5 PG (ref 26–34)
MCHC RBC AUTO-ENTMCNC: 32.4 G/DL (ref 32–36)
MCV RBC AUTO: 88 FL (ref 80–100)
MONOCYTES # BLD AUTO: 0.34 X10*3/UL (ref 0.1–1)
MONOCYTES NFR BLD AUTO: 5.8 %
NEUTROPHILS # BLD AUTO: 3.42 X10*3/UL (ref 1.2–7.7)
NEUTROPHILS NFR BLD AUTO: 58.1 %
NRBC BLD-RTO: 0 /100 WBCS (ref 0–0)
PLATELET # BLD AUTO: 252 X10*3/UL (ref 150–450)
PMV BLD AUTO: 10.1 FL (ref 7.5–11.5)
POTASSIUM SERPL-SCNC: 4.1 MMOL/L (ref 3.5–5.3)
PROT SERPL-MCNC: 6.6 G/DL (ref 6.4–8.2)
RBC # BLD AUTO: 3.72 X10*6/UL (ref 4–5.2)
SODIUM SERPL-SCNC: 142 MMOL/L (ref 136–145)
WBC # BLD AUTO: 5.9 X10*3/UL (ref 4.4–11.3)

## 2023-10-31 PROCEDURE — 85025 COMPLETE CBC W/AUTO DIFF WBC: CPT

## 2023-10-31 PROCEDURE — 74177 CT ABD & PELVIS W/CONTRAST: CPT | Performed by: STUDENT IN AN ORGANIZED HEALTH CARE EDUCATION/TRAINING PROGRAM

## 2023-10-31 PROCEDURE — 80053 COMPREHEN METABOLIC PANEL: CPT

## 2023-10-31 PROCEDURE — 2550000001 HC RX 255 CONTRASTS: Mod: SE | Performed by: STUDENT IN AN ORGANIZED HEALTH CARE EDUCATION/TRAINING PROGRAM

## 2023-10-31 PROCEDURE — 36415 COLL VENOUS BLD VENIPUNCTURE: CPT

## 2023-10-31 PROCEDURE — 71260 CT THORAX DX C+: CPT

## 2023-10-31 PROCEDURE — 71260 CT THORAX DX C+: CPT | Performed by: STUDENT IN AN ORGANIZED HEALTH CARE EDUCATION/TRAINING PROGRAM

## 2023-10-31 PROCEDURE — 99214 OFFICE O/P EST MOD 30 MIN: CPT | Performed by: INTERNAL MEDICINE

## 2023-10-31 PROCEDURE — 83735 ASSAY OF MAGNESIUM: CPT

## 2023-10-31 PROCEDURE — 86304 IMMUNOASSAY TUMOR CA 125: CPT

## 2023-10-31 RX ADMIN — IOHEXOL 75 ML: 350 INJECTION, SOLUTION INTRAVENOUS at 09:58

## 2023-10-31 ASSESSMENT — PATIENT HEALTH QUESTIONNAIRE - PHQ9
SUM OF ALL RESPONSES TO PHQ9 QUESTIONS 1 AND 2: 3
2. FEELING DOWN, DEPRESSED OR HOPELESS: SEVERAL DAYS
1. LITTLE INTEREST OR PLEASURE IN DOING THINGS: MORE THAN HALF THE DAYS

## 2023-10-31 NOTE — PROGRESS NOTES
Subjective   Patient ID: Sharmila Huerta is a 63 y.o. female who presents for FUV.    HPI  Patient in for a visit  Blood pressure is fair she will start exercising  She did not try the chantix but she thinks she quit her last cigarette was today  Her brother passed away last Friday sad that was her closest brother, she is not 'hurting herself sad'   Review of Systems  General: Denies fever, chills, night sweats,  Eyes: Negative for recent visual changes  Ears, Nose, Throat :  Negative for hearing changes, sinus discomfort  Dermatologic: Negative for new skin conditions, rash  Respiratory: Negative for wheezing, shortness of breath, cough  Cardiovascular: Negative for chest pain, palpitations, or leg swelling  Gastrointestinal: Negative for nausea/vomiting, abdominal pain, changes in bowel habits  Genitourinary NEGATIVE FOR URINARY INCONTINENCE urgency , frequency, discomfort   Musculoskeletal: see hpi  Neurological: Negative for headaches, dizziness    Previous history  Past Medical History:   Diagnosis Date    Malignant neoplasm of unspecified ovary (CMS/HCC) 06/27/2022    Malignant neoplasm of ovary, unspecified laterality     Past Surgical History:   Procedure Laterality Date    OTHER SURGICAL HISTORY  06/20/2022    Hysterectomy abdominal    US GUIDED ABDOMINAL PARACENTESIS  11/8/2021    US GUIDED ABDOMINAL PARACENTESIS 11/8/2021 [facility]    US GUIDED ABDOMINAL PARACENTESIS  11/30/2021    US GUIDED ABDOMINAL PARACENTESIS 11/30/2021 [facility] ANCILLARY LEGACY     Social History     Tobacco Use    Smoking status: Former     Types: Cigarettes    Smokeless tobacco: Never   Substance Use Topics    Alcohol use: Not Currently     Family History   Problem Relation Name Age of Onset    Ovarian cancer Sister      Lung cancer Brother      No Known Problems Daughter      No Known Problems Other Child      Allergies   Allergen Reactions    Penicillins Unknown    [place] With Codeine [Acetaminophen-Codeine] Palpitations      Current Outpatient Medications   Medication Instructions    acetaminophen (TYLENOL) 1,000 mg, oral, Every 6 hours PRN, for pain    albuterol sulfate (Proair Digihaler) 90 mcg/actuation aero powdr breath act w/sensor inhaler 1-2 puffs, inhalation,  every 4-6 hours, As Needed for shortness of breath or wheezing<BR>    apixaban (Eliquis) 2.5 mg tablet oral    benzonatate (Tessalon) 200 mg capsule oral    docusate sodium (Colace) 100 mg capsule oral    DULoxetine (CYMBALTA) 30 mg, oral, Nightly, for chemotherapy induced peripheral neuropathy Dx G62.0; G62.9; M79.2<BR>    DULoxetine (CYMBALTA) 60 mg, oral, Nightly,  for chemotherapy induced peripheral neuropathy. Please start after completing 7 days script of 30mg capsules. Dx G62.0; G62.9; M79.2    fluticasone (Flonase) 50 mcg/actuation nasal spray 2 sprays, Each Nostril, Daily, Shake gently. Before first use, prime pump. After use, clean tip and replace cap.    gabapentin (Neurontin) 300 mg capsule oral,  take 1 cap(s) orally once a day (at bedtime) for 3 days. Then increase to 1 capsule orally twice a day for 3 days. Then increase to 1 capsule orally 3 times a day Dx G62.0; G62.9; M79.2    ibuprofen (IBU) 600 mg, oral, Every 6 hours PRN,  for pain    magnesium oxide (Mag-Ox) 400 mg (241.3 mg magnesium) tablet 2 tablets, oral, 2 times daily    olaparib (Lynparza) 100 mg tablet TAKE ONE TABLET OF 100MG AND ONE TABLET OF 150MG (250MG TOTAL DOSE) BY MOUTH 2 TIMES A DAY    olaparib (Lynparza) 150 mg tablet TAKE ONE TABLET OF 100MG AND ONE TABLET OF 150MG (250MG TOTAL DOSE) BY MOUTH 2 TIMES A DAY    olaparib (LYNPARZA) 300 mg, oral, 2 times daily    polyethylene glycol (Gavilax) 17 gram/dose powder oral    pregabalin (Lyrica) 200 mg capsule oral    tiotropium (Spiriva Respimat) 2.5 mcg/actuation inhaler 2 puffs, inhalation, Daily, Go to ER if chest pain or palpitations or feeling worse or visual/eye problems or urinary retention<BR>    topiramate (Topamax) 100 mg tablet  oral    topiramate (Topamax) 50 mg tablet oral    traMADol (ULTRAM) 50 mg, oral, Every 8 hours PRN,  for neoplasm related pain Dx G89.3    traZODone (DESYREL) 100 mg, oral, Nightly    varenicline (Chantix) 0.5 mg tablet oral, Take with full glass of water. Take 1 1 MG X 42 Daily     zinc sulfate (Zincate) 220 (50 Zn) MG capsule 50 mg of elemental zinc, oral, 3 times daily,  for change in taste       Objective       Physical Exam  Vital Signs: as recorded above  General: Well groomed, well nourished   Orientation:  Alert , oriented to time, place , and person   Mood and Affect:  Cooperative , no apparent distress normal affect  Skin: Good color, good turgor  Eyes: Extra ocular muscle movements intact, anicteric sclerae  Neck: Supple, full range of movement  Chest: Normal breath sounds, normal chest wall exam, symmetric, good air entry, clear to auscultation  Heart: Regular rate and rhythm, without murmur, gallop, or rubs  BACK:  no CTLS spine tenderness, no flank tenderness  Extremities: full range of movement  bilateral UE and bilateral LE,  no lower extremity edema  Neurological: Alert, oriented, cranial nerves II-XII intact except for visual acuity  Sensation:  Intact   Gait: normal steady      Assessment/Plan   Sharmila Huerat is a 63 y.o. female who presents for the concerns below:    Problem List Items Addressed This Visit    None    She will be going to see a counselor for her depression and grieving her dtr is seeing one and see him too no thoughts of hurting self or others   STRESS MANAGEMENT:PLAN:  Patient symptoms stable with current medication, will update refills.  No adverse side effects. Follow-up visit in 4-6 months , sooner in 2 months if we are revising dosage. Rest/sleep hygiene, exercise for stress relief  No thoughts of hurting self or others. Follow-up as planned.  TOBACCO USE DISORDER PLAN:  Counseled on strategies to help quit smoking again .  Do not buy favorite cigarettes .   Explained  risks of continuing.  We had sent chantix but did not take the medication and suggested over the counter patches. Gum, lozenges that may help patient quit.  She also has a tobacco cessation course      Discussed with:   Return in : 2 months sooner if needed     Portions of this note were generated using digital voice recognition software, and may contain grammatical errors       Riana Dietrich MD  10/31/23  12:21 PM

## 2023-11-02 ENCOUNTER — OFFICE VISIT (OUTPATIENT)
Dept: GYNECOLOGIC ONCOLOGY | Facility: CLINIC | Age: 63
End: 2023-11-02
Payer: MEDICAID

## 2023-11-02 VITALS
DIASTOLIC BLOOD PRESSURE: 64 MMHG | WEIGHT: 259.26 LBS | RESPIRATION RATE: 16 BRPM | BODY MASS INDEX: 42.63 KG/M2 | OXYGEN SATURATION: 97 % | HEART RATE: 85 BPM | TEMPERATURE: 97.9 F | SYSTOLIC BLOOD PRESSURE: 106 MMHG

## 2023-11-02 DIAGNOSIS — C56.9 MALIGNANT NEOPLASM OF OVARY, UNSPECIFIED LATERALITY (MULTI): Primary | ICD-10-CM

## 2023-11-02 DIAGNOSIS — Z51.11 ENCOUNTER FOR ANTINEOPLASTIC CHEMOTHERAPY: ICD-10-CM

## 2023-11-02 PROCEDURE — 99215 OFFICE O/P EST HI 40 MIN: CPT | Performed by: STUDENT IN AN ORGANIZED HEALTH CARE EDUCATION/TRAINING PROGRAM

## 2023-11-02 ASSESSMENT — PAIN SCALES - GENERAL: PAINLEVEL: 0-NO PAIN

## 2023-11-02 ASSESSMENT — COLUMBIA-SUICIDE SEVERITY RATING SCALE - C-SSRS
2. HAVE YOU ACTUALLY HAD ANY THOUGHTS OF KILLING YOURSELF?: NO
1. IN THE PAST MONTH, HAVE YOU WISHED YOU WERE DEAD OR WISHED YOU COULD GO TO SLEEP AND NOT WAKE UP?: NO
6. HAVE YOU EVER DONE ANYTHING, STARTED TO DO ANYTHING, OR PREPARED TO DO ANYTHING TO END YOUR LIFE?: NO

## 2023-11-02 ASSESSMENT — PATIENT HEALTH QUESTIONNAIRE - PHQ9
1. LITTLE INTEREST OR PLEASURE IN DOING THINGS: NOT AT ALL
2. FEELING DOWN, DEPRESSED OR HOPELESS: NOT AT ALL
SUM OF ALL RESPONSES TO PHQ9 QUESTIONS 1 AND 2: 0

## 2023-11-02 NOTE — PROGRESS NOTES
Patient ID: Sharmila Huerta is a 63 y.o. female.  Referring Physician: No referring provider defined for this encounter.  Primary Care Provider: Riana Dietrich MD    Subjective    Interval History:  Unfortunately her brother passed away and she is grieving, he had a fall and caught pneumonia. Reports she forgets here medication sometimes but feels alright today, denies any new side effects from her medications, denies nausea, bowel movements and appetite are normal.          PMH:  Ovarian cancer      Past Surgical History: BRY, BSO, Appendectomy, omentectomy      Family History:  Ovarian cancer in sister (treated at [facility]), passed 6 months ago at age 60. Her genetic testing was reportedly negative (unclear  if germline or tumor).   Sister received 3 cycles neoadjuvant Carbo/Taxol (2019-2020), followed by aborted debulking (exlap and omtx only on 2020), then Carbo/[place]/Georgina (3/2020-2020), Single agent Georgina (2020-2020), then NRG- (Cediranib/Olaparib) (10/2020-2021),  and finally single agent Doxil.  Otherwise denies a history of gyn related cancers including ovarian, endometrial, breast, pancreas, and GI cancers.      Social History: Tobacco user, denies EtOH or illicit drug use     OBGYN History:  The patient is a .  Entered menopause in her 50s.  Hx of fibroids dx at age 31, no known hx of abnl pap smears.  She never  used HRT      Screening:  -Pap smear: Approx age 49yrs  -Mammogram: Approx age 49yrs (normal)  -Colonoscopy: Approx age 49yrs (normal)    Objective    BSA: 2.33 meters squared  /64   Pulse 85   Temp 36.6 °C (97.9 °F)   Resp 16   Wt 118 kg (259 lb 4.2 oz)   SpO2 97%   BMI 42.63 kg/m²      Physical Exam  Constitutional:       General: She is not in acute distress.     Appearance: Normal appearance. She is not toxic-appearing.   HENT:      Head: Normocephalic.      Mouth/Throat:      Mouth: Mucous membranes are moist.      Pharynx: Oropharynx is clear.    Eyes:      Extraocular Movements: Extraocular movements intact.      Conjunctiva/sclera: Conjunctivae normal.      Pupils: Pupils are equal, round, and reactive to light.   Cardiovascular:      Rate and Rhythm: Normal rate and regular rhythm.      Heart sounds: Normal heart sounds. No murmur heard.     No friction rub. No gallop.   Pulmonary:      Effort: Pulmonary effort is normal.      Breath sounds: Normal breath sounds. No wheezing or rhonchi.   Abdominal:      General: Bowel sounds are normal. There is no distension.      Palpations: Abdomen is soft.      Tenderness: There is no abdominal tenderness.   Musculoskeletal:         General: Normal range of motion.      Cervical back: Normal range of motion.   Skin:     General: Skin is warm.   Neurological:      General: No focal deficit present.      Mental Status: She is alert and oriented to person, place, and time.   Psychiatric:         Mood and Affect: Mood normal.         Behavior: Behavior normal.       Lab on 10/31/2023   Component Date Value Ref Range Status    WBC 10/31/2023 5.9  4.4 - 11.3 x10*3/uL Final    nRBC 10/31/2023 0.0  0.0 - 0.0 /100 WBCs Final    RBC 10/31/2023 3.72 (L)  4.00 - 5.20 x10*6/uL Final    Hemoglobin 10/31/2023 10.6 (L)  12.0 - 16.0 g/dL Final    Hematocrit 10/31/2023 32.7 (L)  36.0 - 46.0 % Final    MCV 10/31/2023 88  80 - 100 fL Final    MCH 10/31/2023 28.5  26.0 - 34.0 pg Final    MCHC 10/31/2023 32.4  32.0 - 36.0 g/dL Final    RDW 10/31/2023 18.0 (H)  11.5 - 14.5 % Final    Platelets 10/31/2023 252  150 - 450 x10*3/uL Final    MPV 10/31/2023 10.1  7.5 - 11.5 fL Final    Neutrophils % 10/31/2023 58.1  40.0 - 80.0 % Final    Immature Granulocytes %, Automated 10/31/2023 0.3  0.0 - 0.9 % Final    Immature Granulocyte Count (IG) includes promyelocytes, myelocytes and metamyelocytes but does not include bands. Percent differential counts (%) should be interpreted in the context of the absolute cell counts (cells/UL).    Lymphocytes  % 10/31/2023 34.1  13.0 - 44.0 % Final    Monocytes % 10/31/2023 5.8  2.0 - 10.0 % Final    Eosinophils % 10/31/2023 1.4  0.0 - 6.0 % Final    Basophils % 10/31/2023 0.3  0.0 - 2.0 % Final    Neutrophils Absolute 10/31/2023 3.42  1.20 - 7.70 x10*3/uL Final    Percent differential counts (%) should be interpreted in the context of the absolute cell counts (cells/uL).    Immature Granulocytes Absolute, Au* 10/31/2023 0.02  0.00 - 0.70 x10*3/uL Final    Lymphocytes Absolute 10/31/2023 2.01  1.20 - 4.80 x10*3/uL Final    Monocytes Absolute 10/31/2023 0.34  0.10 - 1.00 x10*3/uL Final    Eosinophils Absolute 10/31/2023 0.08  0.00 - 0.70 x10*3/uL Final    Basophils Absolute 10/31/2023 0.02  0.00 - 0.10 x10*3/uL Final    Glucose 10/31/2023 122 (H)  74 - 99 mg/dL Final    Sodium 10/31/2023 142  136 - 145 mmol/L Final    Potassium 10/31/2023 4.1  3.5 - 5.3 mmol/L Final    Chloride 10/31/2023 107  98 - 107 mmol/L Final    Bicarbonate 10/31/2023 28  21 - 32 mmol/L Final    Anion Gap 10/31/2023 11  10 - 20 mmol/L Final    Urea Nitrogen 10/31/2023 9  6 - 23 mg/dL Final    Creatinine 10/31/2023 0.92  0.50 - 1.05 mg/dL Final    eGFR 10/31/2023 70  >60 mL/min/1.73m*2 Final    Calculations of estimated GFR are performed using the 2021 CKD-EPI Study Refit equation without the race variable for the IDMS-Traceable creatinine methods.  https://jasn.asnjournals.org/content/early/2021/09/22/ASN.5101956993    Calcium 10/31/2023 9.6  8.6 - 10.6 mg/dL Final    Albumin 10/31/2023 3.9  3.4 - 5.0 g/dL Final    Alkaline Phosphatase 10/31/2023 66  33 - 136 U/L Final    Total Protein 10/31/2023 6.6  6.4 - 8.2 g/dL Final    AST 10/31/2023 11  9 - 39 U/L Final    Bilirubin, Total 10/31/2023 0.4  0.0 - 1.2 mg/dL Final    ALT 10/31/2023 10  7 - 45 U/L Final    Patients treated with Sulfasalazine may generate falsely decreased results for ALT.    Cancer  10/31/2023 5.6  0.0 - 30.2 U/mL Final    Magnesium 10/31/2023 1.79  1.60 - 2.40 mg/dL Final      CT chest abdomen pelvis w IV contrast  Narrative: Interpreted By:  Jackson Rizzo,   STUDY:  CT CHEST ABDOMEN PELVIS W IV CONTRAST;  10/31/2023 10:05 am      INDICATION:  Signs/Symptoms:ca ovary.      COMPARISON:  Chest and abdomen dated 07/25/2023      ACCESSION NUMBER(S):  [id number]      ORDERING CLINICIAN:  PATRIA ASCENCIO      TECHNIQUE:  CT of the chest, abdomen, and pelvis was performed.  Contiguous axial  images were obtained at 3 mm slice thickness through the chest,  abdomen and pelvis. Coronal and sagittal reconstructions at 3 mm  slice thickness were performed.  75 ml of contrast Omnipaque were administered intravenously without  immediate complication.      FINDINGS:  CHEST:      LUNG/PLEURA/LARGE AIRWAYS:  Stable left lower lobe pulmonary nodule measuring 0.9 cm. Stable  right lower lobe pleural-based nodule measuring 0.4 cm (series 202,  image 194). Stable right minor fissure punctate ground-glass nodule  (series 202, image 155). No new suspicious pulmonary nodules.  Bilateral centrilobular emphysema. Trace right-sided pleural effusion  with surrounding atelectasis. No pulmonary consolidation or  pneumothorax. Central airways are patent.      VESSELS:  Aorta and pulmonary arteries are normal caliber.  Mild  atherosclerotic changes are noted of the aorta and branching vessels.  No coronary artery calcifications are present.      HEART:  The heart is normal in size.  Trace pericardial effusion      MEDIASTINUM AND ROSIE:  Stable prominent subcarinal lymph node measuring 1 cm, precarinal  lymph node measuring 0.9 cm and right hilar lymph node measuring 1  cm.. The esophagus is normal.      CHEST WALL AND LOWER NECK:  The soft tissues of the chest wall demonstrate no gross abnormality.  The visualized thyroid gland appears within normal limits.      ABDOMEN:      LIVER:  Normal size. Normal contour. Stable segment 8 hepatic cyst measuring  3 cm and another tiny hypodensity in segment 2 likely cyst  measuring  0.3 cm in the dome.      BILE DUCTS:  No intra or extrahepatic biliary dilatation.      GALLBLADDER:  Calcific uncomplicated cholelithiasis.      PANCREAS:  The pancreas appears unremarkable without evidence of ductal  dilatation or masses.      SPLEEN:  The spleen is normal in size without focal lesions.      ADRENAL GLANDS:  Stable bilateral nonspecific thickening.      KIDNEYS AND URETERS:  The kidneys are normal in size and enhance symmetrically.  No  hydroureteronephrosis or nephroureterolithiasis is identified.      PELVIS:      BLADDER:  Within normal limits.      REPRODUCTIVE ORGANS:  Status post total abdominal hysterectomy and bilateral  salpingo-oophorectomy.      BOWEL:  No bowel dilatation. Uncomplicated colonic diverticulosis. No  ascites. No pneumoperitoneum.      VESSELS:  The aorta and IVC appear normal.      LYMPH NODES:  No enlarged lymphadenopathy.      BONE AND SOFT TISSUE:  No destructive osseous lesions. The abdominal wall soft tissues  appear normal.      Impression: CHEST:  1. Stable left lower lobe pulmonary nodule measuring 0.9 cm along  with a stable few other tiny nodules. No new suspicious pulmonary  nodules  2. Trace right-sided pleural effusion with surrounding atelectasis,  improved from prior.  3. Stable mediastinal and right hilar lymph nodes.      ABDOMEN-PELVIS:  1. Status post total abdominal hysterectomy and bilateral  salpingo-oophorectomy with no locoregional recurrence or metastatic  disease in the abdomen or pelvis.  2. Other ancillary findings are unchanged.          MACRO:  None      Signed by: Jackson Rizzo 10/31/2023 12:15 PM  Dictation workstation:   [id number]        Performance Status:  Asymptomatic    Assessment/Plan     Oncology History Overview Note   Tumor History:  - Tumor Markers: CA-19-9 <4, CA-125 249.3, CEA 1.6  - 10/19/21: IR tissue biopsy (HGS cancer) and paracentesis (11.5L)   - 11/8/21: Paracentesis (12.1 L)  - 11/21: Neoadjuvant Carbo/Taxol  "initiated, CT after 3 cycles with interval improvement in disease   - 2/14/22: Ex lap, BRY, removal of pelvic mass, omentectomy, mobilization of splenic and hepatic flexures, peritonectomy of the right hemidiaphragm, peritonectomy of the posterior cul de sac and bladder, over sewing of the small and large bowel, appendectomy,  HIPEC, argon ablation of sigmoid colon.  - Completed adjuvant chemotherapy in 4/2022  - [place] testing with BRCA1/HRD positive, Germline testing negative    - Discussed plan for Olaparib, initiated May 2022     Malignant neoplasm of ovary (CMS/HCC)   7/27/2023 -  Chemotherapy    Olaparib, 28 Day Cycles - Gyn      10/11/2023 Initial Diagnosis    Malignant neoplasm of ovary (CMS/HCC)       63 y.o. 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 ECOG 1     # High grade serous ovarian cancer  - [facility] my choice with BRCA 1 mutation  - Discussed PARP inhibitor maintenance  - Currently taking olaparib, tolerating well and energy has improved  - Follow-up per chemo calendar   - We discussed signs and symptoms of recurrence.   - Continue at reduced dose, 250mg BID  - CT reviewed and without evidence of disease      # Neurologic sxs  - \"brain fog\", changes to balance  - Improvement with dose reduction     # Neuropathy  - Neuropathy is stable in feet, improved in fingers. She will continue seeing supportive oncology as needed.  - Supportive Onc recently added Topiramate 50mg and patient notices improvement, will continue to monitor  - Taking Duloxetine, Vitamin B6 and Pregabalin.     # Preventative Screening  - Overdue for mammography and colonoscopy     # Nausea  - Likely related to PARP inhibitor  - Improved with resuming antiemetics   - Improved with dose reduction      Scribe Attestation  By signing my name below, I, Jessica Allred, " Scribe   attest that this documentation has been prepared under the direction and in the presence of Debra Parker MD.    Provider Attestation - Scribe documentation    All medical record entries made by the Scribe were at my direction and personally dictated by me. I have reviewed the chart and agree that the record accurately reflects my personal performance of the history, physical exam, discussion and plan.    Debra Parker MD

## 2023-11-08 ENCOUNTER — OFFICE VISIT (OUTPATIENT)
Dept: PALLIATIVE MEDICINE | Facility: CLINIC | Age: 63
End: 2023-11-08
Payer: MEDICAID

## 2023-11-08 VITALS
RESPIRATION RATE: 16 BRPM | TEMPERATURE: 97.3 F | HEART RATE: 88 BPM | WEIGHT: 255.95 LBS | OXYGEN SATURATION: 99 % | SYSTOLIC BLOOD PRESSURE: 127 MMHG | DIASTOLIC BLOOD PRESSURE: 78 MMHG | BODY MASS INDEX: 42.08 KG/M2

## 2023-11-08 DIAGNOSIS — Z51.5 PALLIATIVE CARE ENCOUNTER: Primary | ICD-10-CM

## 2023-11-08 PROCEDURE — 99214 OFFICE O/P EST MOD 30 MIN: CPT | Performed by: NURSE PRACTITIONER

## 2023-11-08 RX ORDER — TRAZODONE HYDROCHLORIDE 100 MG/1
100 TABLET ORAL NIGHTLY
Qty: 30 TABLET | Refills: 2 | Status: SHIPPED | OUTPATIENT
Start: 2023-11-08 | End: 2024-06-05 | Stop reason: WASHOUT

## 2023-11-08 RX ORDER — TOPIRAMATE 100 MG/1
100 TABLET, FILM COATED ORAL 2 TIMES DAILY
Qty: 60 TABLET | Refills: 2 | Status: SHIPPED | OUTPATIENT
Start: 2023-11-08 | End: 2024-03-07 | Stop reason: SDUPTHER

## 2023-11-08 ASSESSMENT — ENCOUNTER SYMPTOMS
DEPRESSION: 0
OCCASIONAL FEELINGS OF UNSTEADINESS: 0
LOSS OF SENSATION IN FEET: 0

## 2023-11-08 ASSESSMENT — PAIN SCALES - GENERAL: PAINLEVEL: 0-NO PAIN

## 2023-11-08 NOTE — PROGRESS NOTES
SUPPORTIVE AND PALLIATIVE ONCOLOGY OUTPATIENT FOLLOW-UP      SERVICE DATE: 11/8/2023    Subjective   HISTORY OF PRESENT ILLNESS: Sharmila Huerta is a 63 y.o. 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 She follows with Dr. Parker as her primary oncologist, currently pursuing maintenance therapy with Olaparib.  She has been referred to Supportive Oncology/Palliative Care for management of neuropathy, neoplasm related pain, loss of appetite, and fatigue.     Pain Assessment:  Pain Score: 0-No pain  Location:    Education:      Symptom Assessment:  Pain:none  Headache: none  Dizziness:none  Lack of energy: somewhat - fatigue from olaparib continues but feels it to be manageable. Recently lost her brother, grieving this loss and finding herself down and endorsing some depression contributing to fatigue  Difficulty sleeping: none - good assistance from Trazodone as needed  Worrying: none  Anxiety: none  Depression: somewhat - as above. Brother recently passed from lung cancer. Finds comfort in knowing he is no longer suffering but grieving his loss. Continues on Duloxetine 90mg daily at bed and Trazodone 100mg at bed PRN  Pain in mouth/swallowing: none  Dry mouth: none  Taste changes: none  Shortness of breath: none  Lack of appetite: none   Nausea: none  Vomiting: none  Constipation: none  Diarrhea: none  Sore muscles: none  Numbness or tingling in hands/feet/other: a little - residual numbness and tingling in fingers and feet, feels it is tolerable with current regimen of Duloxetine, Topiramate, Pregabalin  Weight loss: none  Other: none      Information obtained from: interview of patient  ______________________________________________________________________        Objective      PHYSICAL EXAMINATION   Vital Signs:   Vital signs reviewed  Vitals:    11/08/23 1539   BP: 127/78   Pulse: 88   Resp: 16   Temp: 36.3 °C (97.3 °F)   SpO2: 99%     Pain Score:  0      Physical  Exam  Constitutional:       Appearance: Normal appearance.   Cardiovascular:      Rate and Rhythm: Normal rate and regular rhythm.      Pulses: Normal pulses.      Heart sounds: Normal heart sounds.   Pulmonary:      Effort: Pulmonary effort is normal.      Breath sounds: Normal breath sounds.   Abdominal:      General: Abdomen is flat. Bowel sounds are normal.      Palpations: Abdomen is soft.   Musculoskeletal:         General: Normal range of motion.   Skin:     General: Skin is warm and dry.   Neurological:      Mental Status: She is alert and oriented to person, place, and time.   Psychiatric:         Mood and Affect: Mood normal.         Behavior: Behavior normal.       ASSESSMENT/PLAN    Arthralgia Pain/CIPN:  - Continue Pregabalin 200mg TID - encouraged to take as prescribed  - Continue Duloxetine 90mg once daily at bed  - Continue Topiramate 100mg BID  - Continue Acetaminophen 500mg [id number] PRN for pain - rare use     Constipation:  - Continue Docusate 100mg BID PRN  - Continue Miralax 17grams 1-2x per day PRN  - Continue Milk of Magnesia PRN for no BM in 2-3 days PRN  - Continue Probiotic Drink     SOB/Cough:  - Continue Albuterol 90mcg/inh; 2 puffs [id number] PRN for SOB  - Continue Benzonatate 200mg TID PRN for cough     Depression/Insomnia:  - Referrals sent to Music Therapy and Spiritual Therapy  - Email sent to  for assistance with LW/POA and other home needs  - Referral sent to Smoking Cessation Program   - Registered at [facility] - encouraged to attend for counseling services and assistance with grief coping in loss of her brother  - Continue Duloxetine 90mg at bed - discussed increase but patient declines  - Continue Trazodone 100mg at bed PRN     Fatigue  - Continue to monitor with decreased dose of Olaparib   - Concern for depression    Next Follow-Up Visit:  Return to clinic in 2 months virtual    Signature and billing  Medical complexity was moderate level due to due to complexity of  problems, extensive data review, and high risk of management/treatment.  Time was spent on the following: Prep Time, Time Directly with Patient/Family/Caregiver, Documentation Time. Total time spent: 35      Data    Some elements copied from Supportive Onc note on 8/2/23, the elements have been updated and all reflect current decision making from today, 11/8/2023.      Plan of Care discussed with: Patient    SIGNATURE: SENAIT Goncalves    Contact information:  Supportive and Palliative Oncology  Monday-Friday 8 AM-5 PM  Phone:  [phone], press option #5, then option #1.   Or Epic Secure Chat

## 2023-11-09 ENCOUNTER — TELEPHONE (OUTPATIENT)
Dept: GYNECOLOGIC ONCOLOGY | Facility: HOSPITAL | Age: 63
End: 2023-11-09

## 2023-11-09 NOTE — TELEPHONE ENCOUNTER
Call from patient asking for my fax number so her daughter can fax [place] paperwork.  Fax number provided to patient.

## 2023-11-14 ENCOUNTER — APPOINTMENT (OUTPATIENT)
Dept: PHYSICAL THERAPY | Facility: CLINIC | Age: 63
End: 2023-11-14
Payer: MEDICAID

## 2023-11-15 ENCOUNTER — SPECIALTY PHARMACY (OUTPATIENT)
Dept: PHARMACY | Facility: CLINIC | Age: 63
End: 2023-11-15

## 2023-11-15 DIAGNOSIS — C56.9 MALIGNANT NEOPLASM OF OVARY, UNSPECIFIED LATERALITY (MULTI): Primary | ICD-10-CM

## 2023-11-16 ENCOUNTER — PHARMACY VISIT (OUTPATIENT)
Dept: PHARMACY | Facility: CLINIC | Age: 63
End: 2023-11-16
Payer: MEDICAID

## 2023-11-16 ENCOUNTER — APPOINTMENT (OUTPATIENT)
Dept: PHYSICAL THERAPY | Facility: CLINIC | Age: 63
End: 2023-11-16
Payer: MEDICAID

## 2023-11-16 PROCEDURE — RXMED WILLOW AMBULATORY MEDICATION CHARGE

## 2023-11-21 ENCOUNTER — TREATMENT (OUTPATIENT)
Dept: PHYSICAL THERAPY | Facility: CLINIC | Age: 63
End: 2023-11-21
Payer: MEDICAID

## 2023-11-21 DIAGNOSIS — N39.41 URGE INCONTINENCE: Primary | ICD-10-CM

## 2023-11-21 DIAGNOSIS — M62.89 OTHER SPECIFIED DISORDERS OF MUSCLE: ICD-10-CM

## 2023-11-21 DIAGNOSIS — M62.89 PELVIC FLOOR DYSFUNCTION: ICD-10-CM

## 2023-11-21 DIAGNOSIS — N39.3 STRESS INCONTINENCE (FEMALE) (MALE): ICD-10-CM

## 2023-11-21 PROCEDURE — 97110 THERAPEUTIC EXERCISES: CPT | Mod: GP

## 2023-11-21 NOTE — PROGRESS NOTES
Physical Therapy    Physical Therapy Treatment    Patient Name: Sharmila Huerta  MRN: [id number]  Today's Date: 11/21/2023  Time Calculation  Start Time: 0252  Stop Time: 0348  Time Calculation (min): 56 min  Visit: 6    Assessment:   Patient returns for follow up after death in family (brother) and period of decreased activity/laid in bed over the past month. She reports reduced leakage between sessions. Resumption of therapeutic exercises within session without adverse reaction. Encouraged patient to resume daily HEP in conjunction with new membership to local recreational center. Plan to assess between session response to return to therapeutic exercises.    Plan:  Continue per PT POC. Plan to assess between session response to return to therapeutic exercises.      Current Problem  1. Urge incontinence  Follow Up In Physical Therapy      2. Stress incontinence (female) (male)  Follow Up In Physical Therapy      3. Pelvic floor dysfunction        4. Other specified disorders of muscle  Follow Up In Physical Therapy          Subjective   General   Brother passed away since last visit, laid in the bed for almost a month. Just got out of the bed 11/14/2023.  I've been feeling dehydrated, but I've been drinking water.  Followed up with oncology, PCP since last visit 10/31/2023.  A little leakage, nothing major, I think I've been doing good.  Been able to do a little bit of exercises. Joined [facility]    Objective     Treatments:  Therapeutic exercise: timed minutes 56, units 4.   LE's supported diaphragmatic breathing  Adductor stretch with PB  PF/TA contraction in butterfly position supine on PB  Hooklying PF/TA contraction with hip adduction isometric  Hooklying PF/TA contraction with clamshell, blue TB isometric  Hooklying PF/TA contraction with alt hip march, blue TB  Glute bridge with PF engagement, with clamshell, blue TB  Glute bridge with PB  Quadruped PF/TA alt UE reach, alt LE reach ea  Functional sit to stand with  PF/TA    OP EDUCATION:   Consistency, daily compliance with HEP, bladder retraining techniques, urinary urgency techniques  Provided today: blue TB to update HEP    Goals:  Active       PT Problem       Improved utilization of proper diaphragmatic breathing and proper breathing patterns at rest and with effort and exertion for improved load management to decrease force and pressure on pelvic floor       Start:  10/12/23    Expected End:  12/15/23            Improved utilization of proper diaphragmatic breathing and proper breathing patterns at rest and with effort and exertion for improved load management to decrease force and pressure on pelvic floor, goal partially met  Independent with consistent utilization of urge suppression strategies and bladder retraining strategies, goal partially met  Voiding frequency of 5-8x (at most 8x) per day with void interval of 1x per 2-5 hours  Absent urine leakage with cough, household duties/washing dishes to allow patient to participate in these daily activities without use of pads, goal met  [place] and compliance with HEP and self management for home maintenance, goal partially met  Pt will report a 50% improvement on NIH-CPSI to demonstrate a reduction in symptoms and increase quality of life

## 2023-11-27 ENCOUNTER — DOCUMENTATION (OUTPATIENT)
Dept: GYNECOLOGIC ONCOLOGY | Facility: HOSPITAL | Age: 63
End: 2023-11-27

## 2023-11-27 NOTE — PROGRESS NOTES
[facility] disability paperwork faxed to [facility] at [phone].  Copy of the paperwork was emailed to Patrica Huerta at [email or web address].

## 2023-11-28 ENCOUNTER — DOCUMENTATION (OUTPATIENT)
Dept: PHYSICAL THERAPY | Facility: CLINIC | Age: 63
End: 2023-11-28

## 2023-11-28 ENCOUNTER — APPOINTMENT (OUTPATIENT)
Dept: PHYSICAL THERAPY | Facility: CLINIC | Age: 63
End: 2023-11-28
Payer: MEDICAID

## 2023-11-29 ENCOUNTER — TELEPHONE (OUTPATIENT)
Dept: GYNECOLOGIC ONCOLOGY | Facility: HOSPITAL | Age: 63
End: 2023-11-29

## 2023-11-29 NOTE — TELEPHONE ENCOUNTER
Patient did not have blood work yesterday.  Call to patient and she states that she will go today.  I will call patient after labs are resulted and will mail chemo calendar.

## 2023-11-29 NOTE — PROGRESS NOTES
Physical Therapy                 Therapy Communication Note    Patient Name: Sharmila Huerta  MRN: [id number]  Today's Date: 11/28/2023     Discipline: Physical Therapy    Missed Visit Reason:  Cancel 11/28/2023    Missed Time: Cancel

## 2023-11-30 ENCOUNTER — APPOINTMENT (OUTPATIENT)
Dept: PHYSICAL THERAPY | Facility: CLINIC | Age: 63
End: 2023-11-30
Payer: MEDICAID

## 2023-12-01 ENCOUNTER — LAB (OUTPATIENT)
Dept: LAB | Facility: LAB | Age: 63
End: 2023-12-01
Payer: MEDICAID

## 2023-12-01 DIAGNOSIS — C56.9 MALIGNANT NEOPLASM OF OVARY, UNSPECIFIED LATERALITY (MULTI): ICD-10-CM

## 2023-12-01 LAB
ALBUMIN SERPL BCP-MCNC: 4.1 G/DL (ref 3.4–5)
ALP SERPL-CCNC: 75 U/L (ref 33–136)
ALT SERPL W P-5'-P-CCNC: 8 U/L (ref 7–45)
ANION GAP SERPL CALC-SCNC: 13 MMOL/L (ref 10–20)
AST SERPL W P-5'-P-CCNC: 9 U/L (ref 9–39)
BASOPHILS # BLD AUTO: 0.01 X10*3/UL (ref 0–0.1)
BASOPHILS NFR BLD AUTO: 0.2 %
BILIRUB SERPL-MCNC: 0.3 MG/DL (ref 0–1.2)
BUN SERPL-MCNC: 11 MG/DL (ref 6–23)
CALCIUM SERPL-MCNC: 9.2 MG/DL (ref 8.6–10.6)
CANCER AG125 SERPL-ACNC: 7 U/ML (ref 0–30.2)
CHLORIDE SERPL-SCNC: 106 MMOL/L (ref 98–107)
CO2 SERPL-SCNC: 26 MMOL/L (ref 21–32)
CREAT SERPL-MCNC: 1.04 MG/DL (ref 0.5–1.05)
EOSINOPHIL # BLD AUTO: 0.05 X10*3/UL (ref 0–0.7)
EOSINOPHIL NFR BLD AUTO: 0.8 %
ERYTHROCYTE [DISTWIDTH] IN BLOOD BY AUTOMATED COUNT: 17.2 % (ref 11.5–14.5)
GFR SERPL CREATININE-BSD FRML MDRD: 60 ML/MIN/1.73M*2
GLUCOSE SERPL-MCNC: 102 MG/DL (ref 74–99)
HCT VFR BLD AUTO: 33.2 % (ref 36–46)
HGB BLD-MCNC: 11 G/DL (ref 12–16)
IMM GRANULOCYTES # BLD AUTO: 0.08 X10*3/UL (ref 0–0.7)
IMM GRANULOCYTES NFR BLD AUTO: 1.3 % (ref 0–0.9)
LYMPHOCYTES # BLD AUTO: 2.18 X10*3/UL (ref 1.2–4.8)
LYMPHOCYTES NFR BLD AUTO: 35.7 %
MCH RBC QN AUTO: 29.5 PG (ref 26–34)
MCHC RBC AUTO-ENTMCNC: 33.1 G/DL (ref 32–36)
MCV RBC AUTO: 89 FL (ref 80–100)
MONOCYTES # BLD AUTO: 0.4 X10*3/UL (ref 0.1–1)
MONOCYTES NFR BLD AUTO: 6.6 %
NEUTROPHILS # BLD AUTO: 3.38 X10*3/UL (ref 1.2–7.7)
NEUTROPHILS NFR BLD AUTO: 55.4 %
NRBC BLD-RTO: 0 /100 WBCS (ref 0–0)
PLATELET # BLD AUTO: 251 X10*3/UL (ref 150–450)
POTASSIUM SERPL-SCNC: 4.1 MMOL/L (ref 3.5–5.3)
PROT SERPL-MCNC: 7.1 G/DL (ref 6.4–8.2)
RBC # BLD AUTO: 3.73 X10*6/UL (ref 4–5.2)
SODIUM SERPL-SCNC: 141 MMOL/L (ref 136–145)
WBC # BLD AUTO: 6.1 X10*3/UL (ref 4.4–11.3)

## 2023-12-01 PROCEDURE — 80053 COMPREHEN METABOLIC PANEL: CPT

## 2023-12-01 PROCEDURE — 36415 COLL VENOUS BLD VENIPUNCTURE: CPT

## 2023-12-01 PROCEDURE — 86304 IMMUNOASSAY TUMOR CA 125: CPT

## 2023-12-01 PROCEDURE — 85025 COMPLETE CBC W/AUTO DIFF WBC: CPT

## 2023-12-04 ENCOUNTER — TELEPHONE (OUTPATIENT)
Dept: GYNECOLOGIC ONCOLOGY | Facility: HOSPITAL | Age: 63
End: 2023-12-04

## 2023-12-04 DIAGNOSIS — C56.9 MALIGNANT NEOPLASM OF OVARY, UNSPECIFIED LATERALITY (MULTI): ICD-10-CM

## 2023-12-04 NOTE — TELEPHONE ENCOUNTER
Call to patient to let her know that her lab results meet parameters for continued oral chemotherapy.  Chemotherapy calendar mailed to patient.

## 2023-12-05 ENCOUNTER — TREATMENT (OUTPATIENT)
Dept: PHYSICAL THERAPY | Facility: CLINIC | Age: 63
End: 2023-12-05
Payer: MEDICAID

## 2023-12-05 DIAGNOSIS — N39.3 STRESS INCONTINENCE (FEMALE) (MALE): ICD-10-CM

## 2023-12-05 DIAGNOSIS — M62.89 PELVIC FLOOR DYSFUNCTION: ICD-10-CM

## 2023-12-05 DIAGNOSIS — M62.89 OTHER SPECIFIED DISORDERS OF MUSCLE: ICD-10-CM

## 2023-12-05 DIAGNOSIS — N39.41 URGE INCONTINENCE: Primary | ICD-10-CM

## 2023-12-05 PROCEDURE — 97110 THERAPEUTIC EXERCISES: CPT | Mod: GP

## 2023-12-05 NOTE — PROGRESS NOTES
Physical Therapy    Physical Therapy Treatment    Patient Name: Sharmila Huerta  MRN: [id number]  Today's Date: 12/5/2023  Time Calculation  Start Time: 0115  Visit: 7    Assessment:   Patient declines leakage since last visit. Good set-up and technique with therapeutic exercises and is progressing towards PT goals. Encouraged patient to continue with daily HEP/bladder retraining techniques/urge control techniques consistently in conjunction with new membership to local recreational center, as well as provided [facility] mental health resource list to provide access to care after the passing of her brother. Plan for goal review next.    Plan:  Goal review next.    Current Problem  1. Urge incontinence  Follow Up In Physical Therapy      2. Stress incontinence (female) (male)  Follow Up In Physical Therapy      3. Other specified disorders of muscle  Follow Up In Physical Therapy      4. Pelvic floor dysfunction            Subjective   Sick last week, had to cancel last visit. Feeling better today, residual cough. No leakage.   Difficulty sleeping due to coughing.  Been staying hydrated. Tries to do quick flicks but at times feels she goes more than 10 times/day. Quick flicks help.  Been able to do a little bit of exercises; breathing more and can tell the difference. Joined [email or web address], but hasn't been out of the house.  No pain.    Objective     Treatments:  Therapeutic exercise: timed minutes 45, units 3.   LE's supported diaphragmatic breathing  Adductor stretch with PB  PF/TA contraction in butterfly position supine on PB  Seated PF/TA contraction with hip adduction isometric  Seated PF/TA contraction with clamshell, blue TB  Seated PF/TA contraction with alt hip march, blue TB  Glute bridge with PF engagement, with clamshell, blue TB  Glute bridge with PB, DKTC with PB  Quadruped PF/TA alt UE reach, alt LE reach ea  Functional sit to stand with PF/TA  Scapular row, blue TB, with PF/TA    OP EDUCATION:   Provided Mental  Health Resource handout due to recent passing of brother; patient plans to contact soon. Consistency, daily compliance with HEP. Urgency control techniques, bladder retraining.  HEP handout.    Goals:  Active       PT Problem       Improved utilization of proper diaphragmatic breathing and proper breathing patterns at rest and with effort and exertion for improved load management to decrease force and pressure on pelvic floor       Start:  10/12/23    Expected End:  12/15/23            Improved utilization of proper diaphragmatic breathing and proper breathing patterns at rest and with effort and exertion for improved load management to decrease force and pressure on pelvic floor, goal partially met  Independent with consistent utilization of urge suppression strategies and bladder retraining strategies, goal partially met  Voiding frequency of 5-8x (at most 8x) per day with void interval of 1x per 2-5 hours  Absent urine leakage with cough, household duties/washing dishes to allow patient to participate in these daily activities without use of pads, goal met  [place] and compliance with HEP and self management for home maintenance, goal partially met  Pt will report a 50% improvement on NIH-CPSI to demonstrate a reduction in symptoms and increase quality of life

## 2023-12-12 ENCOUNTER — TREATMENT (OUTPATIENT)
Dept: PHYSICAL THERAPY | Facility: CLINIC | Age: 63
End: 2023-12-12
Payer: MEDICAID

## 2023-12-12 DIAGNOSIS — N39.41 URGE INCONTINENCE: Primary | ICD-10-CM

## 2023-12-12 DIAGNOSIS — M62.89 OTHER SPECIFIED DISORDERS OF MUSCLE: ICD-10-CM

## 2023-12-12 DIAGNOSIS — N39.3 STRESS INCONTINENCE (FEMALE) (MALE): ICD-10-CM

## 2023-12-12 PROCEDURE — 97110 THERAPEUTIC EXERCISES: CPT | Mod: GP

## 2023-12-12 PROCEDURE — 97530 THERAPEUTIC ACTIVITIES: CPT | Mod: GP

## 2023-12-12 NOTE — PROGRESS NOTES
Physical Therapy    Physical Therapy Treatment    Patient Name: Sharmila Huerta  MRN: [id number]  Today's Date: 12/12/2023  Time Calculation  Start Time: 0100  Stop Time: 0200  Time Calculation (min): 60 min  Visit: 8    Assessment:   Patient continues to progress with PT POC towards all goals. Overall, patient reports reduced episodes of urinary leakage and amount. Between sessions, patient admits to reduced HEP compliance, but is gradually resuming consistency with HEP. Patient had to cancel appointments due to passing of her brother and illness. With recent illness, she reports leakage at height of persistent coughing; therefore, reinstructed in ensuring consistent knack technique. Re-instructed and educated on the benefits of consistency with PT POC and HEP to maximize the benefits of therapy and establish lifestyle changes/behavior modifications. Patient verbalizes understanding. Despite intermittent inconsistencies, she is progressing with PT POC as shown in the below stated goal review. Patient would benefit from continued PT interventions to progress towards partially met/unmet goals. Insurance authorization date ends 12/15/2023. Plan to submit for additional time to complete the initial POC as described on PT evaluation, to 10 visits (2 visits to be completed) in order to progress towards all partially met goals.     Plan:  Plan to submit for additional time to complete the initial POC as described on PT evaluation, to 10 visits (2 visits to be completed) in order to progress towards all partially met goals.     Current Problem  1. Urge incontinence  Follow Up In Physical Therapy    Follow Up In Physical Therapy      2. Stress incontinence (female) (male)  Follow Up In Physical Therapy    Follow Up In Physical Therapy      3. Other specified disorders of muscle  Follow Up In Physical Therapy    Follow Up In Physical Therapy          Subjective   Sick this weekend, coughing - a little leakage at height of  coughing, pelvic floor contraction helps control the leakage. Better since. One time per month, one occurrence when going to sit on the toilet.   Voids 8 times/day.  Doing fine sleeping and staying hydrated.  HEP every other day. Joined [employer], but hasn't been there yet.  No pain.  Provided Mental Health Resource handout at last visit due to recent passing of brother; patient plans to contact soon.    Objective   Bladder/Bowel:  No pain  Excessive Urinary Urgency: occasionally  Daytime voiding frequency: 8 voids  Nighttime voiding frequency: 1 void  Unintentional urine/stool loss (days/week): at height of coughing, occasional  Leakage occurs with: at height of cold/flu with cough or about to sit down on toilet (once a month)   Leakage amount: small-moderate  Protection: pad at times, just in case  Slow/weak urine stream: not at all  Able to void completely: yes  Bowel movement frequency: daily  Frequent diarrhea: no  Constipation/Straining/Incomplete bowel movement: no  Taking stool softeners or fiber supplements: occasionally    Improved diaphragmatic breathing  Lumbar AROM:  Extension: Active nil limitation.   Flexion: Active fingertips to toes.   Side Bend: R Active fingertips to fibular head, L Active fingertips to fibular head.     Hip Adduction strength: 5/5  Hip Flexion 4-/5  Hip Abduction: 4/5  Hip Extension: 4/5  Bridge endurance: 25 seconds    Female NIH - Chronic Prostatitis Symptom Index (NIH-CPSI): Pain: 0; Urinary Symptoms: 1; QOL Impact: 3; total: 4    Treatments:  Therapeutic exercise: timed minutes 45, units 3.  Reassess  Glute bridge with PB, DKTC with PB  PF/TA contraction in butterfly position supine on PB  Scapular row, blue TB, with PF/TA  Multifidus press out, blue TB, with PF/TA  Lateral step out and back, blue TB, with PF/TA  Reviewed:  Seated PF/TA contraction with hip adduction isometric  Seated PF/TA contraction with clamshell, blue TB  Seated PF/TA contraction with alt hip march, blue  TB  Glute bridge with PF engagement, with clamshell, blue TB  Quadruped PF/TA alt UE reach, alt LE reach ea  Therapeutic Activity x 15 minutes: Knack technique with cough. Functional sit to stand with PF/TA, engagement of knack technique with stand to sit transfer (to simulate toilet transfer)    OP EDUCATION:   Consistency, daily compliance with HEP.    Goals:  Active       PT Problem       Improved utilization of proper diaphragmatic breathing and proper breathing patterns at rest and with effort and exertion for improved load management to decrease force and pressure on pelvic floor       Start:  10/12/23    Expected End:  12/15/23            Improved utilization of proper diaphragmatic breathing and proper breathing patterns at rest and with effort and exertion for improved load management to decrease force and pressure on pelvic floor, goal partially met  Independent with consistent utilization of urge suppression strategies and bladder retraining strategies, goal partially met  Voiding frequency of 5-8x (at most 8x) per day with void interval of 1x per 2-5 hours, goal met  Absent urine leakage with cough, household duties/washing dishes to allow patient to participate in these daily activities without use of pads, goal met (dishes), with recent cold/flu noticed some leakage with coughing at height of the cold, but better since  [place] and compliance with HEP and self management for home maintenance, goal partially met  Pt will report a 50% improvement on NIH-CPSI to demonstrate a reduction in symptoms and increase quality of life , goal partially met

## 2023-12-14 ENCOUNTER — SOCIAL WORK (OUTPATIENT)
Dept: CASE MANAGEMENT | Facility: HOSPITAL | Age: 63
End: 2023-12-14

## 2023-12-14 NOTE — PROGRESS NOTES
SW received an email from patient's SRS , Alexandra Baxter. This is to verify patient's participation in her health care plan. HOLDEN advised CM that patient has been compliant with all clinic and medical appointments. This was a quarterly review. HOLDEN reached out to patient to advise; message left.

## 2023-12-18 PROCEDURE — RXMED WILLOW AMBULATORY MEDICATION CHARGE

## 2023-12-20 ENCOUNTER — APPOINTMENT (OUTPATIENT)
Dept: PRIMARY CARE | Facility: CLINIC | Age: 63
End: 2023-12-20

## 2023-12-26 ENCOUNTER — LAB (OUTPATIENT)
Dept: LAB | Facility: LAB | Age: 63
End: 2023-12-26
Payer: MEDICAID

## 2023-12-26 DIAGNOSIS — C56.9 MALIGNANT NEOPLASM OF OVARY, UNSPECIFIED LATERALITY (MULTI): ICD-10-CM

## 2023-12-26 LAB
ALBUMIN SERPL BCP-MCNC: 3.9 G/DL (ref 3.4–5)
ALP SERPL-CCNC: 65 U/L (ref 33–136)
ALT SERPL W P-5'-P-CCNC: 9 U/L (ref 7–45)
ANION GAP SERPL CALC-SCNC: 12 MMOL/L (ref 10–20)
AST SERPL W P-5'-P-CCNC: 9 U/L (ref 9–39)
BASOPHILS # BLD AUTO: 0.02 X10*3/UL (ref 0–0.1)
BASOPHILS NFR BLD AUTO: 0.4 %
BILIRUB SERPL-MCNC: 0.3 MG/DL (ref 0–1.2)
BUN SERPL-MCNC: 8 MG/DL (ref 6–23)
CALCIUM SERPL-MCNC: 9.6 MG/DL (ref 8.6–10.6)
CANCER AG125 SERPL-ACNC: 7.7 U/ML (ref 0–30.2)
CHLORIDE SERPL-SCNC: 109 MMOL/L (ref 98–107)
CO2 SERPL-SCNC: 27 MMOL/L (ref 21–32)
CREAT SERPL-MCNC: 0.8 MG/DL (ref 0.5–1.05)
EOSINOPHIL # BLD AUTO: 0.08 X10*3/UL (ref 0–0.7)
EOSINOPHIL NFR BLD AUTO: 1.5 %
ERYTHROCYTE [DISTWIDTH] IN BLOOD BY AUTOMATED COUNT: 16.8 % (ref 11.5–14.5)
GFR SERPL CREATININE-BSD FRML MDRD: 82 ML/MIN/1.73M*2
GLUCOSE SERPL-MCNC: 140 MG/DL (ref 74–99)
HCT VFR BLD AUTO: 31.8 % (ref 36–46)
HGB BLD-MCNC: 10.4 G/DL (ref 12–16)
IMM GRANULOCYTES # BLD AUTO: 0.03 X10*3/UL (ref 0–0.7)
IMM GRANULOCYTES NFR BLD AUTO: 0.6 % (ref 0–0.9)
LYMPHOCYTES # BLD AUTO: 2.26 X10*3/UL (ref 1.2–4.8)
LYMPHOCYTES NFR BLD AUTO: 42.6 %
MCH RBC QN AUTO: 28.3 PG (ref 26–34)
MCHC RBC AUTO-ENTMCNC: 32.7 G/DL (ref 32–36)
MCV RBC AUTO: 87 FL (ref 80–100)
MONOCYTES # BLD AUTO: 0.29 X10*3/UL (ref 0.1–1)
MONOCYTES NFR BLD AUTO: 5.5 %
NEUTROPHILS # BLD AUTO: 2.62 X10*3/UL (ref 1.2–7.7)
NEUTROPHILS NFR BLD AUTO: 49.4 %
NRBC BLD-RTO: 0 /100 WBCS (ref 0–0)
PLATELET # BLD AUTO: 261 X10*3/UL (ref 150–450)
POTASSIUM SERPL-SCNC: 4 MMOL/L (ref 3.5–5.3)
PROT SERPL-MCNC: 6.9 G/DL (ref 6.4–8.2)
RBC # BLD AUTO: 3.67 X10*6/UL (ref 4–5.2)
SODIUM SERPL-SCNC: 144 MMOL/L (ref 136–145)
WBC # BLD AUTO: 5.3 X10*3/UL (ref 4.4–11.3)

## 2023-12-26 PROCEDURE — 86304 IMMUNOASSAY TUMOR CA 125: CPT

## 2023-12-26 PROCEDURE — 80053 COMPREHEN METABOLIC PANEL: CPT

## 2023-12-26 PROCEDURE — 36415 COLL VENOUS BLD VENIPUNCTURE: CPT

## 2023-12-26 PROCEDURE — 85025 COMPLETE CBC W/AUTO DIFF WBC: CPT

## 2023-12-27 ENCOUNTER — OFFICE VISIT (OUTPATIENT)
Dept: PRIMARY CARE | Facility: CLINIC | Age: 63
End: 2023-12-27
Payer: MEDICAID

## 2023-12-27 ENCOUNTER — TELEPHONE (OUTPATIENT)
Dept: GYNECOLOGIC ONCOLOGY | Facility: HOSPITAL | Age: 63
End: 2023-12-27

## 2023-12-27 ENCOUNTER — LAB (OUTPATIENT)
Dept: LAB | Facility: LAB | Age: 63
End: 2023-12-27
Payer: MEDICAID

## 2023-12-27 VITALS — WEIGHT: 253 LBS | DIASTOLIC BLOOD PRESSURE: 68 MMHG | SYSTOLIC BLOOD PRESSURE: 117 MMHG | BODY MASS INDEX: 41.6 KG/M2

## 2023-12-27 DIAGNOSIS — R39.9 URINARY SYMPTOM OR SIGN: ICD-10-CM

## 2023-12-27 DIAGNOSIS — R39.9 URINARY SYMPTOM OR SIGN: Primary | ICD-10-CM

## 2023-12-27 DIAGNOSIS — C56.9 MALIGNANT NEOPLASM OF OVARY, UNSPECIFIED LATERALITY (MULTI): ICD-10-CM

## 2023-12-27 LAB
APPEARANCE UR: CLEAR
BACTERIA #/AREA URNS AUTO: ABNORMAL /HPF
BILIRUB UR STRIP.AUTO-MCNC: NEGATIVE MG/DL
COLOR UR: YELLOW
GLUCOSE UR STRIP.AUTO-MCNC: NEGATIVE MG/DL
KETONES UR STRIP.AUTO-MCNC: NEGATIVE MG/DL
LEUKOCYTE ESTERASE UR QL STRIP.AUTO: ABNORMAL
NITRITE UR QL STRIP.AUTO: NEGATIVE
PH UR STRIP.AUTO: 6 [PH]
PROT UR STRIP.AUTO-MCNC: NEGATIVE MG/DL
RBC # UR STRIP.AUTO: ABNORMAL /UL
RBC #/AREA URNS AUTO: ABNORMAL /HPF
SP GR UR STRIP.AUTO: 1
SQUAMOUS #/AREA URNS AUTO: ABNORMAL /HPF
UROBILINOGEN UR STRIP.AUTO-MCNC: <2 MG/DL
WBC #/AREA URNS AUTO: ABNORMAL /HPF

## 2023-12-27 PROCEDURE — 87086 URINE CULTURE/COLONY COUNT: CPT

## 2023-12-27 PROCEDURE — 99214 OFFICE O/P EST MOD 30 MIN: CPT | Performed by: INTERNAL MEDICINE

## 2023-12-27 PROCEDURE — 87186 SC STD MICRODIL/AGAR DIL: CPT

## 2023-12-27 PROCEDURE — 81001 URINALYSIS AUTO W/SCOPE: CPT

## 2023-12-27 ASSESSMENT — ENCOUNTER SYMPTOMS
LOSS OF SENSATION IN FEET: 1
DEPRESSION: 0
OCCASIONAL FEELINGS OF UNSTEADINESS: 0

## 2023-12-27 ASSESSMENT — PATIENT HEALTH QUESTIONNAIRE - PHQ9
SUM OF ALL RESPONSES TO PHQ9 QUESTIONS 1 AND 2: 0
2. FEELING DOWN, DEPRESSED OR HOPELESS: NOT AT ALL
1. LITTLE INTEREST OR PLEASURE IN DOING THINGS: NOT AT ALL

## 2023-12-27 ASSESSMENT — COLUMBIA-SUICIDE SEVERITY RATING SCALE - C-SSRS
1. IN THE PAST MONTH, HAVE YOU WISHED YOU WERE DEAD OR WISHED YOU COULD GO TO SLEEP AND NOT WAKE UP?: NO
6. HAVE YOU EVER DONE ANYTHING, STARTED TO DO ANYTHING, OR PREPARED TO DO ANYTHING TO END YOUR LIFE?: NO
2. HAVE YOU ACTUALLY HAD ANY THOUGHTS OF KILLING YOURSELF?: NO

## 2023-12-27 NOTE — TELEPHONE ENCOUNTER
Call to patient to let her know that her labs meet parameters for her to continue Olaparib therapy.  She will have a phone FUV with Dr. Parker tomorrow.  Chemo schedule will be mailed to patient.

## 2023-12-27 NOTE — PROGRESS NOTES
Subjective   Patient ID: Sharmila Huerta is a 63 y.o. female who presents for FUV and UTI (UTI Sx since last week.  There is irritation when voiding.).    HPI  Patient in for a visit  Usually good with sleep , no pain but with urinary irritability   Has cut back to 1 pack for 3 days,  before it was 1/2 ppd     Review of Systems  ROS  :    General: Denies fever, chills, night sweats,  Gastrointestinal: Negative for nausea/vomiting, abdominal pain, changes in bowel habits  Genitourinary:see hpi  negative for URINARY INCONTINENCE prior to this UTI   Neurological: Negative for headaches, dizziness    Previous history  Past Medical History:   Diagnosis Date    Malignant neoplasm of unspecified ovary (CMS/HCC) 06/27/2022    Malignant neoplasm of ovary, unspecified laterality     Past Surgical History:   Procedure Laterality Date    OTHER SURGICAL HISTORY  06/20/2022    Hysterectomy abdominal    US GUIDED ABDOMINAL PARACENTESIS  11/8/2021    US GUIDED ABDOMINAL PARACENTESIS 11/8/2021 [facility]    US GUIDED ABDOMINAL PARACENTESIS  11/30/2021    US GUIDED ABDOMINAL PARACENTESIS 11/30/2021 [facility]     Social History     Tobacco Use    Smoking status: Every Day     Types: Cigarettes    Smokeless tobacco: Never   Substance Use Topics    Alcohol use: Not Currently     Family History   Problem Relation Name Age of Onset    Ovarian cancer Sister      Lung cancer Brother      No Known Problems Daughter      No Known Problems Other Child      Allergies   Allergen Reactions    Penicillins Unknown    Capital With Codeine [Acetaminophen-Codeine] Palpitations     Current Outpatient Medications   Medication Instructions    acetaminophen (TYLENOL) 1,000 mg, oral, Every 6 hours PRN, for pain    albuterol sulfate (Proair Digihaler) 90 mcg/actuation aero powdr breath act w/sensor inhaler 1-2 puffs, inhalation,  every 4-6 hours, As Needed for shortness of breath or wheezing<BR>    apixaban (Eliquis) 2.5 mg tablet oral     benzonatate (Tessalon) 200 mg capsule oral    docusate sodium (Colace) 100 mg capsule oral    DULoxetine (CYMBALTA) 30 mg, oral, Nightly, for chemotherapy induced peripheral neuropathy Dx G62.0; G62.9; M79.2<BR>    DULoxetine (CYMBALTA) 60 mg, oral, Nightly,  for chemotherapy induced peripheral neuropathy. Please start after completing 7 days script of 30mg capsules. Dx G62.0; G62.9; M79.2    fluticasone (Flonase) 50 mcg/actuation nasal spray 2 sprays, Each Nostril, Daily, Shake gently. Before first use, prime pump. After use, clean tip and replace cap.    gabapentin (Neurontin) 300 mg capsule oral,  take 1 cap(s) orally once a day (at bedtime) for 3 days. Then increase to 1 capsule orally twice a day for 3 days. Then increase to 1 capsule orally 3 times a day Dx G62.0; G62.9; M79.2    ibuprofen (IBU) 600 mg, oral, Every 6 hours PRN,  for pain    magnesium oxide (Mag-Ox) 400 mg (241.3 mg magnesium) tablet 2 tablets, oral, 2 times daily    olaparib (Lynparza) 100 mg tablet TAKE ONE TABLET OF 100MG AND ONE TABLET OF 150MG (250MG TOTAL DOSE) BY MOUTH 2 TIMES A DAY    olaparib (Lynparza) 150 mg tablet TAKE ONE TABLET OF 100MG AND ONE TABLET OF 150MG (250MG TOTAL DOSE) BY MOUTH 2 TIMES A DAY    olaparib (LYNPARZA) 300 mg, oral, 2 times daily    polyethylene glycol (Gavilax) 17 gram/dose powder oral    pregabalin (Lyrica) 200 mg capsule oral    tiotropium (Spiriva Respimat) 2.5 mcg/actuation inhaler 2 puffs, inhalation, Daily, Go to ER if chest pain or palpitations or feeling worse or visual/eye problems or urinary retention<BR>    topiramate (TOPAMAX) 100 mg, oral, 2 times daily    traMADol (ULTRAM) 50 mg, oral, Every 8 hours PRN,  for neoplasm related pain Dx G89.3    traZODone (DESYREL) 100 mg, oral, Nightly    varenicline (Chantix) 0.5 mg tablet oral, Take with full glass of water. Take 1 1 MG X 42 Daily     zinc sulfate (Zincate) 220 (50 Zn) MG capsule 50 mg of elemental zinc, oral, 3 times daily,  for change in  taste       Objective       Physical Exam    Vital Signs: as recorded above  General: Well groomed, well nourished   Orientation:  Alert , oriented to time, place , and person   Mood and Affect:  Cooperative , no apparent distress normal affect  Skin: Good color, good turgor  Eyes: Extra ocular muscle movements intact, anicteric sclerae  Neck: Supple, full range of movement  Chest: Normal breath sounds, normal chest wall exam, symmetric, good air entry, clear to auscultation  Heart: Regular rate and rhythm, without murmur, gallop, or rubs  Abdomen soft nontender no masses felt no hepatosplenomegaly, no rebound or guarding  BACK:  no CTLS spine tenderness, no flank tenderness  Extremities: full range of movement  bilateral UE and bilateral LE,  no lower extremity edema  Neurological: Alert, oriented, cranial nerves II-XII intact except for visual acuity  Sensation:  Intact   Gait: normal steady    Assessment/Plan   Sharmila Huerta is a 63 y.o. female who presents for the concerns below:    Problem List Items Addressed This Visit    None  UTI PLAN: Hydration, bladder hygiene discussed with patient, urinalysis, urine culture if with positive findings. Antibiotic treatment if appropriate, nitrofurantoin, ciprofloxacin or bactrim, subsequent choice will be adjusted pending culture results if needed   Live culture yogurt or probiotics to help regrow good bacteria . May try uristat/pyridium (warned of orange discoloration of urine) for pain/spasm, cranberry juice or capsules if not allergic. If recurrent, may need to consult urology/urogynecology to check etiology.  If any problems arise patient to call or come back in. More than 50% of office visit time spent counseling the patient  TOBACCO USE DISORDER PLAN:  Counseled on strategies to help quit smoking.  Continue to cut back on smoking to 1 packfor 4 daysnext visit Do not buy favorite cigarettes .   Explained risks of continuing.  Recommended medication and suggested  over the counter patches. Gum, lozenges that may help patient quit.           Discussed with:   Return in :    Portions of this note were generated using digital voice recognition software, and may contain grammatical errors       Riana Dietrich MD  12/27/23  12:40 PM

## 2023-12-28 ENCOUNTER — TELEMEDICINE (OUTPATIENT)
Dept: GYNECOLOGIC ONCOLOGY | Facility: CLINIC | Age: 63
End: 2023-12-28
Payer: MEDICAID

## 2023-12-28 DIAGNOSIS — C56.9 MALIGNANT NEOPLASM OF OVARY, UNSPECIFIED LATERALITY (MULTI): ICD-10-CM

## 2023-12-28 DIAGNOSIS — Z51.11 ENCOUNTER FOR ANTINEOPLASTIC CHEMOTHERAPY: ICD-10-CM

## 2023-12-28 DIAGNOSIS — R41.3 MEMORY CHANGES: Primary | ICD-10-CM

## 2023-12-28 PROCEDURE — 99442 PR PHYS/QHP TELEPHONE EVALUATION 11-20 MIN: CPT | Performed by: STUDENT IN AN ORGANIZED HEALTH CARE EDUCATION/TRAINING PROGRAM

## 2023-12-28 NOTE — PROGRESS NOTES
Patient ID: Sharmila Huerta is a 63 y.o. female.  Referring Physician: No referring provider defined for this encounter.  Primary Care Provider: Riana Dietrich MD    Subjective    Interval History:  Overall doing well, both her and her daughter note that she is having more memory issues, it did seem to be getting a little bit better with the dose reduction of Olaparib but it is to the point where it is now frustrating, Sharmila often feels like she'll forget what she is talking about mid sentence and her kids will notice that she will repeat a story that she just told them. Otherwise she is eating, drinking, going to the bathroom well, no other concerns or issues.          PMH:  Ovarian cancer      Past Surgical History: BRY, BSO, Appendectomy, omentectomy      Family History:  Ovarian cancer in sister (treated at [facility]), passed 6 months ago at age 60. Her genetic testing was reportedly negative (unclear  if germline or tumor).   Sister received 3 cycles neoadjuvant Carbo/Taxol (2019-2020), followed by aborted debulking (exlap and omtx only on 2020), then Carbo/[place]/Georgina (3/2020-2020), Single agent Georgina (2020-2020), then NRG- (Cediranib/Olaparib) (10/2020-2021),  and finally single agent Doxil.  Otherwise denies a history of gyn related cancers including ovarian, endometrial, breast, pancreas, and GI cancers.      Social History: Tobacco user, denies EtOH or illicit drug use     OBGYN History:  The patient is a .  Entered menopause in her 50s.  Hx of fibroids dx at age 31, no known hx of abnl pap smears.  She never  used HRT      Screening:  -Pap smear: Approx age 49yrs  -Mammogram: Approx age 49yrs (normal)  -Colonoscopy: Approx age 49yrs (normal)    Objective    BSA: There is no height or weight on file to calculate BSA.  There were no vitals taken for this visit.     Physical Exam  Lab on 2023   Component Date Value Ref Range Status    Urine Culture 2023 >100,000  Enteric bacilli (A)   Preliminary    Color, Urine 12/27/2023 Yellow  Straw, Yellow Final    Appearance, Urine 12/27/2023 Clear  Clear Final    Specific Gravity, Urine 12/27/2023 1.002 (N)  1.005 - 1.035 Final    pH, Urine 12/27/2023 6.0  5.0, 5.5, 6.0, 6.5, 7.0, 7.5, 8.0 Final    Protein, Urine 12/27/2023 NEGATIVE  NEGATIVE mg/dL Final    Glucose, Urine 12/27/2023 NEGATIVE  NEGATIVE mg/dL Final    Blood, Urine 12/27/2023 SMALL (1+) (A)  NEGATIVE Final    Ketones, Urine 12/27/2023 NEGATIVE  NEGATIVE mg/dL Final    Bilirubin, Urine 12/27/2023 NEGATIVE  NEGATIVE Final    Urobilinogen, Urine 12/27/2023 <2.0  <2.0 mg/dL Final    Nitrite, Urine 12/27/2023 NEGATIVE  NEGATIVE Final    Leukocyte Esterase, Urine 12/27/2023 SMALL (1+) (A)  NEGATIVE Final    WBC, Urine 12/27/2023 6-10 (A)  1-5, NONE /HPF Final    RBC, Urine 12/27/2023 1-2  NONE, 1-2, 3-5 /HPF Final    Squamous Epithelial Cells, Urine 12/27/2023 1-9 (SPARSE)  Reference range not established. /HPF Final    Bacteria, Urine 12/27/2023 2+ (A)  NONE SEEN /HPF Final   Lab on 12/26/2023   Component Date Value Ref Range Status    WBC 12/26/2023 5.3  4.4 - 11.3 x10*3/uL Final    nRBC 12/26/2023 0.0  0.0 - 0.0 /100 WBCs Final    RBC 12/26/2023 3.67 (L)  4.00 - 5.20 x10*6/uL Final    Hemoglobin 12/26/2023 10.4 (L)  12.0 - 16.0 g/dL Final    Hematocrit 12/26/2023 31.8 (L)  36.0 - 46.0 % Final    MCV 12/26/2023 87  80 - 100 fL Final    MCH 12/26/2023 28.3  26.0 - 34.0 pg Final    MCHC 12/26/2023 32.7  32.0 - 36.0 g/dL Final    RDW 12/26/2023 16.8 (H)  11.5 - 14.5 % Final    Platelets 12/26/2023 261  150 - 450 x10*3/uL Final    Neutrophils % 12/26/2023 49.4  40.0 - 80.0 % Final    Immature Granulocytes %, Automated 12/26/2023 0.6  0.0 - 0.9 % Final    Immature Granulocyte Count (IG) includes promyelocytes, myelocytes and metamyelocytes but does not include bands. Percent differential counts (%) should be interpreted in the context of the absolute cell counts (cells/UL).     Lymphocytes % 12/26/2023 42.6  13.0 - 44.0 % Final    Monocytes % 12/26/2023 5.5  2.0 - 10.0 % Final    Eosinophils % 12/26/2023 1.5  0.0 - 6.0 % Final    Basophils % 12/26/2023 0.4  0.0 - 2.0 % Final    Neutrophils Absolute 12/26/2023 2.62  1.20 - 7.70 x10*3/uL Final    Percent differential counts (%) should be interpreted in the context of the absolute cell counts (cells/uL).    Immature Granulocytes Absolute, Au* 12/26/2023 0.03  0.00 - 0.70 x10*3/uL Final    Lymphocytes Absolute 12/26/2023 2.26  1.20 - 4.80 x10*3/uL Final    Monocytes Absolute 12/26/2023 0.29  0.10 - 1.00 x10*3/uL Final    Eosinophils Absolute 12/26/2023 0.08  0.00 - 0.70 x10*3/uL Final    Basophils Absolute 12/26/2023 0.02  0.00 - 0.10 x10*3/uL Final    Glucose 12/26/2023 140 (H)  74 - 99 mg/dL Final    Sodium 12/26/2023 144  136 - 145 mmol/L Final    Potassium 12/26/2023 4.0  3.5 - 5.3 mmol/L Final    Chloride 12/26/2023 109 (H)  98 - 107 mmol/L Final    Bicarbonate 12/26/2023 27  21 - 32 mmol/L Final    Anion Gap 12/26/2023 12  10 - 20 mmol/L Final    Urea Nitrogen 12/26/2023 8  6 - 23 mg/dL Final    Creatinine 12/26/2023 0.80  0.50 - 1.05 mg/dL Final    eGFR 12/26/2023 82  >60 mL/min/1.73m*2 Final    Calculations of estimated GFR are performed using the 2021 CKD-EPI Study Refit equation without the race variable for the IDMS-Traceable creatinine methods.  https://jasn.asnjournals.org/content/early/2021/09/22/ASN.1779731834    Calcium 12/26/2023 9.6  8.6 - 10.6 mg/dL Final    Albumin 12/26/2023 3.9  3.4 - 5.0 g/dL Final    Alkaline Phosphatase 12/26/2023 65  33 - 136 U/L Final    Total Protein 12/26/2023 6.9  6.4 - 8.2 g/dL Final    AST 12/26/2023 9  9 - 39 U/L Final    Bilirubin, Total 12/26/2023 0.3  0.0 - 1.2 mg/dL Final    ALT 12/26/2023 9  7 - 45 U/L Final    Patients treated with Sulfasalazine may generate falsely decreased results for ALT.    Cancer  12/26/2023 7.7  0.0 - 30.2 U/mL Final     CT chest abdomen pelvis w IV  contrast  Narrative: Interpreted By:  Jackson Rizzo,   STUDY:  CT CHEST ABDOMEN PELVIS W IV CONTRAST;  10/31/2023 10:05 am      INDICATION:  Signs/Symptoms:ca ovary.      COMPARISON:  Chest and abdomen dated 07/25/2023      ACCESSION NUMBER(S):  [id number]      ORDERING CLINICIAN:  PATRIA ASCENCIO      TECHNIQUE:  CT of the chest, abdomen, and pelvis was performed.  Contiguous axial  images were obtained at 3 mm slice thickness through the chest,  abdomen and pelvis. Coronal and sagittal reconstructions at 3 mm  slice thickness were performed.  75 ml of contrast Omnipaque were administered intravenously without  immediate complication.      FINDINGS:  CHEST:      LUNG/PLEURA/LARGE AIRWAYS:  Stable left lower lobe pulmonary nodule measuring 0.9 cm. Stable  right lower lobe pleural-based nodule measuring 0.4 cm (series 202,  image 194). Stable right minor fissure punctate ground-glass nodule  (series 202, image 155). No new suspicious pulmonary nodules.  Bilateral centrilobular emphysema. Trace right-sided pleural effusion  with surrounding atelectasis. No pulmonary consolidation or  pneumothorax. Central airways are patent.      VESSELS:  Aorta and pulmonary arteries are normal caliber.  Mild  atherosclerotic changes are noted of the aorta and branching vessels.  No coronary artery calcifications are present.      HEART:  The heart is normal in size.  Trace pericardial effusion      MEDIASTINUM AND ROSIE:  Stable prominent subcarinal lymph node measuring 1 cm, precarinal  lymph node measuring 0.9 cm and right hilar lymph node measuring 1  cm.. The esophagus is normal.      CHEST WALL AND LOWER NECK:  The soft tissues of the chest wall demonstrate no gross abnormality.  The visualized thyroid gland appears within normal limits.      ABDOMEN:      LIVER:  Normal size. Normal contour. Stable segment 8 hepatic cyst measuring  3 cm and another tiny hypodensity in segment 2 likely cyst measuring  0.3 cm in the dome.       BILE DUCTS:  No intra or extrahepatic biliary dilatation.      GALLBLADDER:  Calcific uncomplicated cholelithiasis.      PANCREAS:  The pancreas appears unremarkable without evidence of ductal  dilatation or masses.      SPLEEN:  The spleen is normal in size without focal lesions.      ADRENAL GLANDS:  Stable bilateral nonspecific thickening.      KIDNEYS AND URETERS:  The kidneys are normal in size and enhance symmetrically.  No  hydroureteronephrosis or nephroureterolithiasis is identified.      PELVIS:      BLADDER:  Within normal limits.      REPRODUCTIVE ORGANS:  Status post total abdominal hysterectomy and bilateral  salpingo-oophorectomy.      BOWEL:  No bowel dilatation. Uncomplicated colonic diverticulosis. No  ascites. No pneumoperitoneum.      VESSELS:  The aorta and IVC appear normal.      LYMPH NODES:  No enlarged lymphadenopathy.      BONE AND SOFT TISSUE:  No destructive osseous lesions. The abdominal wall soft tissues  appear normal.      Impression: CHEST:  1. Stable left lower lobe pulmonary nodule measuring 0.9 cm along  with a stable few other tiny nodules. No new suspicious pulmonary  nodules  2. Trace right-sided pleural effusion with surrounding atelectasis,  improved from prior.  3. Stable mediastinal and right hilar lymph nodes.      ABDOMEN-PELVIS:  1. Status post total abdominal hysterectomy and bilateral  salpingo-oophorectomy with no locoregional recurrence or metastatic  disease in the abdomen or pelvis.  2. Other ancillary findings are unchanged.          MACRO:  None      Signed by: Jackson Rizzo 10/31/2023 12:15 PM  Dictation workstation:   [id number]        Performance Status:  Asymptomatic    Assessment/Plan     Oncology History Overview Note   Tumor History:  - Tumor Markers: CA-19-9 <4, CA-125 249.3, CEA 1.6  - 10/19/21: IR tissue biopsy (HGS cancer) and paracentesis (11.5L)   - 11/8/21: Paracentesis (12.1 L)  - 11/21: Neoadjuvant Carbo/Taxol initiated, CT after 3 cycles with  "interval improvement in disease   - 2/14/22: Ex lap, BRY, removal of pelvic mass, omentectomy, mobilization of splenic and hepatic flexures, peritonectomy of the right hemidiaphragm, peritonectomy of the posterior cul de sac and bladder, over sewing of the small and large bowel, appendectomy,  HIPEC, argon ablation of sigmoid colon.  - Completed adjuvant chemotherapy in 4/2022  - [employer] Interactive testing with BRCA1/HRD positive, Germline testing negative    - Discussed plan for Olaparib, initiated May 2022     Malignant neoplasm of ovary (CMS/HCC)   7/27/2023 -  Chemotherapy    Olaparib, 28 Day Cycles - Gyn      10/11/2023 Initial Diagnosis    Malignant neoplasm of ovary (CMS/HCC)       63 y.o. 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 ECOG 1     # High grade serous ovarian cancer  - [facility] my choice with BRCA 1 mutation  - Discussed PARP inhibitor maintenance  - Currently taking olaparib, tolerating well and energy has improved  - Follow-up per chemo calendar   - We discussed signs and symptoms of recurrence.   - Continue at reduced dose, 250mg BID  - CT after this cycle      # Neurologic sxs  - \"brain fog\", changes to balance  - Improvement with dose reduction  - Referral to speech pathology     # Neuropathy  - Neuropathy is stable in feet, improved in fingers. She will continue seeing supportive oncology as needed.  - Supportive Onc recently added Topiramate 50mg and patient notices improvement, will continue to monitor  - Taking Duloxetine, Vitamin B6 and Pregabalin.     # Preventative Screening  - Overdue for mammography and colonoscopy     # Nausea  - Likely related to PARP inhibitor  - Improved with resuming antiemetics   - Improved with dose reduction    I spent 11 minutes virtually or in a telephone with this patient and/or family. More than 50% " of the time was spent in counseling and/or coordination of care.      Scribe Attestation  By signing my name below, I, Sarah Fang   attest that this documentation has been prepared under the direction and in the presence of Debra Parker MD.     Provider Attestation - Scribe documentation    All medical record entries made by the Scribe were at my direction and personally dictated by me. I have reviewed the chart and agree that the record accurately reflects my personal performance of the history, physical exam, discussion and plan.    Debra Parker MD

## 2023-12-29 ENCOUNTER — TELEPHONE (OUTPATIENT)
Dept: PRIMARY CARE | Facility: CLINIC | Age: 63
End: 2023-12-29

## 2023-12-29 LAB
BACTERIA UR CULT: ABNORMAL
BACTERIA UR CULT: ABNORMAL

## 2023-12-29 RX ORDER — NITROFURANTOIN 25; 75 MG/1; MG/1
100 CAPSULE ORAL 2 TIMES DAILY
Qty: 14 CAPSULE | Refills: 0 | Status: SHIPPED | OUTPATIENT
Start: 2023-12-29 | End: 2024-01-05

## 2023-12-29 NOTE — TELEPHONE ENCOUNTER
Called Pt and left message relaying info.  Lg          ----- Message from Riana Dietrich MD sent at 12/29/2023  3:17 PM EST -----  Calling in nitrofurantoin aq

## 2024-01-04 ENCOUNTER — SPECIALTY PHARMACY (OUTPATIENT)
Dept: PHARMACY | Facility: CLINIC | Age: 64
End: 2024-01-04

## 2024-01-15 ENCOUNTER — TELEPHONE (OUTPATIENT)
Dept: GYNECOLOGIC ONCOLOGY | Facility: HOSPITAL | Age: 64
End: 2024-01-15

## 2024-01-15 NOTE — TELEPHONE ENCOUNTER
Call from patient requesting a letter to excuse her from jury duty this month.  Letter mailed to patient.

## 2024-01-17 ENCOUNTER — PHARMACY VISIT (OUTPATIENT)
Dept: PHARMACY | Facility: CLINIC | Age: 64
End: 2024-01-17
Payer: MEDICAID

## 2024-01-18 ENCOUNTER — APPOINTMENT (OUTPATIENT)
Dept: SPEECH THERAPY | Facility: CLINIC | Age: 64
End: 2024-01-18
Payer: MEDICAID

## 2024-01-18 ENCOUNTER — SPECIALTY PHARMACY (OUTPATIENT)
Dept: GYNECOLOGIC ONCOLOGY | Facility: HOSPITAL | Age: 64
End: 2024-01-18

## 2024-01-18 NOTE — PROGRESS NOTES
[facility] PATIENT REASSESSMENT NOTE    [facility] SUPPLIED MEDICATION:  Lynparza     The patient's care will be continued with the referring prescriber.     TOLERANCE:   Have you experienced any side effects from this medication? Patient reports fatigue and residual neuropathy. Nausea improved with dose reduction and routine use of nausea meds.   Are there any changes to current therapy regimen? No changes to current regimen     EFFICACY:    Have you developed any new symptoms of your condition? No new symptoms reported  How do you feel your medication is affecting your disease state? Controlling disease well    GOALS:  Your goals of therapy are:  disease control     COMPLIANCE / ADHERENCE:  Have you had any unplanned missed doses? Yes  If yes, how often do you miss doses and is there a particular contributing reason? Patient tends to sleep more in the winter and forgets morning dose of olaparib occasionally. Noted better adherence over the last cuple of weeks.   What barriers to adherence does the patient have? (Answer Y/N for all):  Patient has a difficult time remembering to take medications? N. She has clear schedule of 10 am and 10 pm  Difficult administration technique? N  Medication cost? N  Patient does not think medication is beneficial? N  Other? N  What actions were taken to address barriers? N/A. Pt aware of importance of adherence    Does the patient have any barriers to self-administration (including physical and mental)? None reported  List barriers:  N/A  Describe actions taken to mitigate barriers: N/A    GENERAL ASSESSMENT:  Are there any changes to your home medications, OTCs or supplements? N/A  Do you have any new allergies? N/A   Have you been diagnosed with any new medical conditions? N/A    PATIENT MANAGEMENT:  Do you have any questions regarding your medications, or care? Discussed duration of therapy of olaparib.   Do you have any concerns with access to your  medications?  No access concerns  How would you classify your Quality of Life on a scale of 1-10? N/A  How would you describe your satisfaction with  Specialty Pharmacy services on a scale of 1-10?  No concerns with getting shipments  Do you have any additional suggestions to improve  Specialty Pharmacy services: N/A    IMPRESSION/PLAN:  Is patient high risk (potential patients:  pregnancy, geriatric, pediatric)?  N/A  Is laboratory follow-up needed? At discretion of provider  Is a clinical intervention needed? N/A  Next reassessment date? In 3 months   Additional comments:

## 2024-01-23 ENCOUNTER — APPOINTMENT (OUTPATIENT)
Dept: RADIOLOGY | Facility: CLINIC | Age: 64
End: 2024-01-23
Payer: MEDICAID

## 2024-01-25 ENCOUNTER — TREATMENT (OUTPATIENT)
Dept: PHYSICAL THERAPY | Facility: CLINIC | Age: 64
End: 2024-01-25
Payer: MEDICAID

## 2024-01-25 DIAGNOSIS — M62.89 PELVIC FLOOR DYSFUNCTION: ICD-10-CM

## 2024-01-25 DIAGNOSIS — N39.41 URGE INCONTINENCE: Primary | ICD-10-CM

## 2024-01-25 DIAGNOSIS — N39.3 STRESS INCONTINENCE, FEMALE: ICD-10-CM

## 2024-01-25 PROCEDURE — 97530 THERAPEUTIC ACTIVITIES: CPT | Mod: GP

## 2024-01-25 PROCEDURE — 97110 THERAPEUTIC EXERCISES: CPT | Mod: GP

## 2024-01-25 NOTE — PROGRESS NOTES
Physical Therapy    Physical Therapy Treatment    Patient Name: Sharmila Huerta  MRN: [id number]  Today's Date: 1/25/2024  Time Calculation  Start Time: 1000  Stop Time: 1100  Time Calculation (min): 60 min  Visit: 9    Assessment:   UTI between sessions with change in urinary incontinence which has since improved. Between sessions, patient admits to reduced HEP compliance, but is gradually resuming consistency with HEP and is motivated to return to the [facility] once the weather breaks. Re-instructed and educated on the benefits of consistency with PT POC and HEP to maximize the benefits of therapy and establish lifestyle changes/behavior modifications. Plan to ensure Indep with HEP at next visit, goal review next.    Plan:  Goal review next visit.     Current Problem  1. Urge incontinence        2. Pelvic floor dysfunction        3. Stress incontinence, female              Subjective   Had UTI since last visit (irritable, bladder wouldn't empty) and went out of town. No leakage during UTI. Took medication for the UTI and no longer with irritable symptoms, but noticed leakage while taking the medication for the UTI.  A little leakage with waking up and trying to get to the bathroom; no leakage in the past 1.5 weeks. No use of pads over the past 1.5 weeks.  HEP 3x/week, 3 exercises.  Picked a mental health provider after recent loss of brother, but hasn't gone yet. Not as depressed, has been getting out of the bed more.  Joined [facility], but hasn't been there yet.  No pain.    Objective     Treatments:  Therapeutic exercise: timed minutes 45, units 3.  Glute bridge with PB, DKTC with PB  Scapular row, blue TB, with PF/TA  Multifidus press out, blue TB, with PF/TA  Lateral step out and back, blue TB, with PF/TA  Reviewed:  Seated PF/TA contraction with hip adduction isometric  Seated PF/TA contraction with clamshell, blue TB  Seated PF/TA contraction with alt hip march, blue TB  Glute bridge with PF engagement, with shawn,  blue TB  Quadruped PF/TA alt UE reach, alt LE reach ea  Therapeutic Activity x 15 minutes: Urgency control techniques. Functional sit to stand with PF/TA engagement to simulate toilet transfer    OP EDUCATION:   Consistency, daily compliance with HEP.    Goals:  Active       PT Problem       Improved utilization of proper diaphragmatic breathing and proper breathing patterns at rest and with effort and exertion for improved load management to decrease force and pressure on pelvic floor       Start:  10/12/23    Expected End:  12/15/23            Improved utilization of proper diaphragmatic breathing and proper breathing patterns at rest and with effort and exertion for improved load management to decrease force and pressure on pelvic floor, goal partially met  Independent with consistent utilization of urge suppression strategies and bladder retraining strategies, goal partially met  Voiding frequency of 5-8x (at most 8x) per day with void interval of 1x per 2-5 hours, goal met  Absent urine leakage with cough, household duties/washing dishes to allow patient to participate in these daily activities without use of pads, goal met (dishes), with recent cold/flu noticed some leakage with coughing at height of the cold, but better since  Vevay and compliance with HEP and self management for home maintenance, goal partially met  Pt will report a 50% improvement on NIH-CPSI to demonstrate a reduction in symptoms and increase quality of life , goal partially met

## 2024-01-30 ENCOUNTER — HOSPITAL ENCOUNTER (OUTPATIENT)
Dept: RADIOLOGY | Facility: CLINIC | Age: 64
Discharge: HOME | End: 2024-01-30
Payer: MEDICAID

## 2024-01-30 ENCOUNTER — TREATMENT (OUTPATIENT)
Dept: PHYSICAL THERAPY | Facility: CLINIC | Age: 64
End: 2024-01-30
Payer: MEDICAID

## 2024-01-30 ENCOUNTER — LAB (OUTPATIENT)
Dept: LAB | Facility: CLINIC | Age: 64
End: 2024-01-30
Payer: MEDICAID

## 2024-01-30 ENCOUNTER — DOCUMENTATION (OUTPATIENT)
Dept: PHYSICAL THERAPY | Facility: CLINIC | Age: 64
End: 2024-01-30

## 2024-01-30 DIAGNOSIS — N39.3 STRESS INCONTINENCE, FEMALE: ICD-10-CM

## 2024-01-30 DIAGNOSIS — C56.9 MALIGNANT NEOPLASM OF OVARY, UNSPECIFIED LATERALITY (MULTI): ICD-10-CM

## 2024-01-30 DIAGNOSIS — N39.41 URGE INCONTINENCE: Primary | ICD-10-CM

## 2024-01-30 DIAGNOSIS — M62.89 PELVIC FLOOR DYSFUNCTION: ICD-10-CM

## 2024-01-30 LAB
ALBUMIN SERPL BCP-MCNC: 3.8 G/DL (ref 3.4–5)
ALP SERPL-CCNC: 68 U/L (ref 33–136)
ALT SERPL W P-5'-P-CCNC: 10 U/L (ref 7–45)
ANION GAP SERPL CALC-SCNC: 12 MMOL/L (ref 10–20)
AST SERPL W P-5'-P-CCNC: 12 U/L (ref 9–39)
BASOPHILS # BLD AUTO: 0.01 X10*3/UL (ref 0–0.1)
BASOPHILS NFR BLD AUTO: 0.2 %
BILIRUB SERPL-MCNC: 0.3 MG/DL (ref 0–1.2)
BUN SERPL-MCNC: 8 MG/DL (ref 6–23)
CALCIUM SERPL-MCNC: 9.4 MG/DL (ref 8.6–10.6)
CANCER AG125 SERPL-ACNC: 6.4 U/ML (ref 0–30.2)
CHLORIDE SERPL-SCNC: 106 MMOL/L (ref 98–107)
CO2 SERPL-SCNC: 26 MMOL/L (ref 21–32)
CREAT SERPL-MCNC: 0.88 MG/DL (ref 0.5–1.05)
EGFRCR SERPLBLD CKD-EPI 2021: 73 ML/MIN/1.73M*2
EOSINOPHIL # BLD AUTO: 0.14 X10*3/UL (ref 0–0.7)
EOSINOPHIL NFR BLD AUTO: 2.5 %
ERYTHROCYTE [DISTWIDTH] IN BLOOD BY AUTOMATED COUNT: 18.4 % (ref 11.5–14.5)
GLUCOSE SERPL-MCNC: 125 MG/DL (ref 74–99)
HCT VFR BLD AUTO: 31 % (ref 36–46)
HGB BLD-MCNC: 9.8 G/DL (ref 12–16)
IMM GRANULOCYTES # BLD AUTO: 0.02 X10*3/UL (ref 0–0.7)
IMM GRANULOCYTES NFR BLD AUTO: 0.4 % (ref 0–0.9)
LYMPHOCYTES # BLD AUTO: 1.54 X10*3/UL (ref 1.2–4.8)
LYMPHOCYTES NFR BLD AUTO: 27.8 %
MCH RBC QN AUTO: 28.8 PG (ref 26–34)
MCHC RBC AUTO-ENTMCNC: 31.6 G/DL (ref 32–36)
MCV RBC AUTO: 91 FL (ref 80–100)
MONOCYTES # BLD AUTO: 0.31 X10*3/UL (ref 0.1–1)
MONOCYTES NFR BLD AUTO: 5.6 %
NEUTROPHILS # BLD AUTO: 3.51 X10*3/UL (ref 1.2–7.7)
NEUTROPHILS NFR BLD AUTO: 63.5 %
NRBC BLD-RTO: ABNORMAL /100{WBCS}
PLATELET # BLD AUTO: 234 X10*3/UL (ref 150–450)
POTASSIUM SERPL-SCNC: 4.1 MMOL/L (ref 3.5–5.3)
PROT SERPL-MCNC: 6.4 G/DL (ref 6.4–8.2)
RBC # BLD AUTO: 3.4 X10*6/UL (ref 4–5.2)
SODIUM SERPL-SCNC: 140 MMOL/L (ref 136–145)
WBC # BLD AUTO: 5.5 X10*3/UL (ref 4.4–11.3)

## 2024-01-30 PROCEDURE — 85025 COMPLETE CBC W/AUTO DIFF WBC: CPT

## 2024-01-30 PROCEDURE — 86304 IMMUNOASSAY TUMOR CA 125: CPT

## 2024-01-30 PROCEDURE — 74177 CT ABD & PELVIS W/CONTRAST: CPT

## 2024-01-30 PROCEDURE — 36415 COLL VENOUS BLD VENIPUNCTURE: CPT

## 2024-01-30 PROCEDURE — 2550000001 HC RX 255 CONTRASTS: Mod: SE | Performed by: STUDENT IN AN ORGANIZED HEALTH CARE EDUCATION/TRAINING PROGRAM

## 2024-01-30 PROCEDURE — 97110 THERAPEUTIC EXERCISES: CPT | Mod: GP

## 2024-01-30 PROCEDURE — 74177 CT ABD & PELVIS W/CONTRAST: CPT | Performed by: RADIOLOGY

## 2024-01-30 PROCEDURE — 71260 CT THORAX DX C+: CPT | Performed by: RADIOLOGY

## 2024-01-30 PROCEDURE — 80053 COMPREHEN METABOLIC PANEL: CPT

## 2024-01-30 RX ADMIN — IOHEXOL 75 ML: 350 INJECTION, SOLUTION INTRAVENOUS at 12:35

## 2024-01-30 NOTE — PROGRESS NOTES
Physical Therapy    Discharge Summary    Name: Sharmila Huerta  MRN: [id number]  : 1960  Date: 24    Discharge Summary: PT    Discharge Information: Date of discharge 2024, Date of last visit 2024, Date of evaluation 08/15/2023, Number of attended visits 10, Referred by Dr. Gomez Schaefer, and Referred for urge incontinence, stress incontinence, pelvic floor dysfunction    Therapy Summary/Status:  Patient is without leakage over the last week. She previously reported mild leakage with sitting to void on the toilet, but has been able to manage with quick flicks, as well as knack with cough. She has resumed HEP compliance and is voiding approximately 8 voids/day. Reviewed HEP and recommend patient continue with this consistently, as she has demonstrated managed symptoms within PT POC with consistent HEP compliance. Due to majority of PT goals met and ability to manage with consistent HEP, patient discharged from PT POC to Indep HEP.       Rehab Discharge Reason: Achieved all and/or the most significant goals(s)  Recommend patient continue with HEP consistently, as she has demonstrated managed symptoms within PT POC with consistent HEP compliance.

## 2024-01-30 NOTE — PROGRESS NOTES
Physical Therapy    Physical Therapy Treatment    Patient Name: Sharmila Huerta  MRN: [id number]  Today's Date: 1/30/2024  Time Calculation  Start Time: 1110  Stop Time: 1140  Time Calculation (min): 30 min  Visit: 10    Assessment:   Patient is without leakage over the last week. She previously reported mild leakage with sitting to void on the toilet, but has been able to manage with quick flicks, as well as knack with cough. She has resumed HEP compliance and is voiding approximately 8 voids/day. Reviewed HEP and recommend patient continue with this consistently, as she has demonstrated managed symptoms within PT POC with consistent HEP compliance. Due to majority of PT goals met and ability to manage with consistent HEP, patient discharged from PT POC to Indep HEP.    Plan:  Recommend patient continue with HEP consistently, as she has demonstrated managed symptoms within PT POC with consistent HEP compliance.  Discharge from PT POC.    Current Problem  1. Urge incontinence        2. Pelvic floor dysfunction        3. Stress incontinence, female            Subjective   HEP daily.  No leakage since last visit. Continues with quick flicks. 8 voids/day, unchanged.  Picked a mental health provider after recent loss of brother.  Joined [facility] - plans to go when it gets warm.  No pain.      Objective   Pain Location: no pain  Pain with emptying bladder? no  Pain when urine or clothing touches the skin over the vaginal area or when wiping? no    Bladder:  Daytime voiding frequency: 8 voids/day  Unintentional urine/stool loss (days/week): none over the past week; previously small amount with sit to stand - able to manage with quick flicks  Fluid Intake: drinks water consistently throughout the day  Difficulty initiating urine flow:  not at all  Slow/weak urine stream:  not at all  Frequent UTI: recent UTI 12/2023, treated 01/2024 (did notice increase in leakage during UTI which resolved after medication)    Female [facility] -  Chronic Prostatitis Symptom Index (NIH-CPSI): Pain: 0; Urinary Symptoms: 3; QOL Impact: 3; total: 6    Treatments:  Therapeutic exercise: timed minutes 30  Reviewed/performed:  Glute bridge with PB, DKTC with PB  Scapular row, blue TB, with PF/TA  Multifidus press out, blue TB, with PF/TA  Lateral step out and back, blue TB, with PF/TA  Seated PF/TA contraction with hip adduction isometric  Seated PF/TA contraction with clamshell, blue TB  Seated PF/TA contraction with alt hip march, blue TB  Glute bridge with PF engagement, with clamshell, blue TB  Quadruped PF/TA alt UE reach, alt LE reach ea  Therapeutic Activity x 15 minutes: Urgency control techniques. Functional sit to stand with PF/TA engagement to simulate toilet transfer    OP EDUCATION:   Consistent compliance with HEP.    Goals:  Active       PT Problem       Improved utilization of proper diaphragmatic breathing and proper breathing patterns at rest and with effort and exertion for improved load management to decrease force and pressure on pelvic floor       Start:  10/12/23    Expected End:  12/15/23            Improved utilization of proper diaphragmatic breathing and proper breathing patterns at rest and with effort and exertion for improved load management to decrease force and pressure on pelvic floor, goal met  Independent with consistent utilization of urge suppression strategies and bladder retraining strategies, goal met  Voiding frequency of 5-8x (at most 8x) per day with void interval of 1x per 2-5 hours, goal met  Absent urine leakage with cough, household duties/washing dishes to allow patient to participate in these daily activities without use of pads, goal met (no leakage with cough, household duties/washing dishes)  Lee and compliance with HEP and self management for home maintenance, goal met  Pt will report a 50% improvement on NIH-CPSI to demonstrate a reduction in symptoms and increase quality of life , goal not met

## 2024-02-01 ENCOUNTER — TELEMEDICINE (OUTPATIENT)
Dept: GYNECOLOGIC ONCOLOGY | Facility: CLINIC | Age: 64
End: 2024-02-01
Payer: MEDICAID

## 2024-02-01 DIAGNOSIS — C56.9 MALIGNANT NEOPLASM OF OVARY, UNSPECIFIED LATERALITY (MULTI): ICD-10-CM

## 2024-02-01 DIAGNOSIS — Z51.11 ENCOUNTER FOR ANTINEOPLASTIC CHEMOTHERAPY: ICD-10-CM

## 2024-02-01 DIAGNOSIS — C56.9 MALIGNANT NEOPLASM OF OVARY, UNSPECIFIED LATERALITY (MULTI): Primary | ICD-10-CM

## 2024-02-01 PROCEDURE — 99441 PR PHYS/QHP TELEPHONE EVALUATION 5-10 MIN: CPT | Performed by: STUDENT IN AN ORGANIZED HEALTH CARE EDUCATION/TRAINING PROGRAM

## 2024-02-01 NOTE — PROGRESS NOTES
Patient ID: Sharmila Huerta is a 63 y.o. female.  Referring Physician: No referring provider defined for this encounter.  Primary Care Provider: Riana Dietrich MD    Subjective    Interval History:    Miss Doll's doing well, she had a rachid time for the holidays, she was able to go visit family in [place], no issues with eating, drinking, no issues with energy levels, going to the bathroom Ok, no concerns, we reviewed her CT scan.    They deny any other symptoms other than those listed in the interval history.    PMH:  Ovarian cancer      Past Surgical History: BRY, BSO, Appendectomy, omentectomy      Family History:  Ovarian cancer in sister (treated at [facility]), passed 6 months ago at age 60. Her genetic testing was reportedly negative (unclear  if germline or tumor).   Sister received 3 cycles neoadjuvant Carbo/Taxol (2019-2020), followed by aborted debulking (exlap and omtx only on 2020), then Carbo/[place]/Georgina (3/2020-2020), Single agent Georgina (2020-2020), then NRG- (Cediranib/Olaparib) (10/2020-2021),  and finally single agent Doxil.  Otherwise denies a history of gyn related cancers including ovarian, endometrial, breast, pancreas, and GI cancers.      Social History: Tobacco user, denies EtOH or illicit drug use     OBGYN History:  The patient is a .  Entered menopause in her 50s.  Hx of fibroids dx at age 31, no known hx of abnl pap smears.  She never  used HRT      Screening:  -Pap smear: Approx age 49yrs  -Mammogram: Approx age 49yrs (normal)  -Colonoscopy: Approx age 49yrs (normal)    Objective    BSA: There is no height or weight on file to calculate BSA.  There were no vitals taken for this visit.     Physical Exam  Lab on 2024   Component Date Value Ref Range Status    WBC 2024 5.5  4.4 - 11.3 x10*3/uL Final    nRBC 2024    Final    Not Measured    RBC 2024 3.40 (L)  4.00 - 5.20 x10*6/uL Final    Hemoglobin 2024 9.8 (L)  12.0 - 16.0  g/dL Final    Hematocrit 01/30/2024 31.0 (L)  36.0 - 46.0 % Final    MCV 01/30/2024 91  80 - 100 fL Final    MCH 01/30/2024 28.8  26.0 - 34.0 pg Final    MCHC 01/30/2024 31.6 (L)  32.0 - 36.0 g/dL Final    RDW 01/30/2024 18.4 (H)  11.5 - 14.5 % Final    Platelets 01/30/2024 234  150 - 450 x10*3/uL Final    Neutrophils % 01/30/2024 63.5  40.0 - 80.0 % Final    Immature Granulocytes %, Automated 01/30/2024 0.4  0.0 - 0.9 % Final    Immature Granulocyte Count (IG) includes promyelocytes, myelocytes and metamyelocytes but does not include bands. Percent differential counts (%) should be interpreted in the context of the absolute cell counts (cells/UL).    Lymphocytes % 01/30/2024 27.8  13.0 - 44.0 % Final    Monocytes % 01/30/2024 5.6  2.0 - 10.0 % Final    Eosinophils % 01/30/2024 2.5  0.0 - 6.0 % Final    Basophils % 01/30/2024 0.2  0.0 - 2.0 % Final    Neutrophils Absolute 01/30/2024 3.51  1.20 - 7.70 x10*3/uL Final    Percent differential counts (%) should be interpreted in the context of the absolute cell counts (cells/uL).    Immature Granulocytes Absolute, Au* 01/30/2024 0.02  0.00 - 0.70 x10*3/uL Final    Lymphocytes Absolute 01/30/2024 1.54  1.20 - 4.80 x10*3/uL Final    Monocytes Absolute 01/30/2024 0.31  0.10 - 1.00 x10*3/uL Final    Eosinophils Absolute 01/30/2024 0.14  0.00 - 0.70 x10*3/uL Final    Basophils Absolute 01/30/2024 0.01  0.00 - 0.10 x10*3/uL Final    Glucose 01/30/2024 125 (H)  74 - 99 mg/dL Final    Sodium 01/30/2024 140  136 - 145 mmol/L Final    Potassium 01/30/2024 4.1  3.5 - 5.3 mmol/L Final    Chloride 01/30/2024 106  98 - 107 mmol/L Final    Bicarbonate 01/30/2024 26  21 - 32 mmol/L Final    Anion Gap 01/30/2024 12  10 - 20 mmol/L Final    Urea Nitrogen 01/30/2024 8  6 - 23 mg/dL Final    Creatinine 01/30/2024 0.88  0.50 - 1.05 mg/dL Final    eGFR 01/30/2024 73  >60 mL/min/1.73m*2 Final    Calculations of estimated GFR are performed using the 2021 CKD-EPI Study Refit equation without the  race variable for the IDMS-Traceable creatinine methods.  https://jasn.asnjournals.org/content/early/2021/09/22/ASN.9606809711    Calcium 01/30/2024 9.4  8.6 - 10.6 mg/dL Final    Albumin 01/30/2024 3.8  3.4 - 5.0 g/dL Final    Alkaline Phosphatase 01/30/2024 68  33 - 136 U/L Final    Total Protein 01/30/2024 6.4  6.4 - 8.2 g/dL Final    AST 01/30/2024 12  9 - 39 U/L Final    Bilirubin, Total 01/30/2024 0.3  0.0 - 1.2 mg/dL Final    ALT 01/30/2024 10  7 - 45 U/L Final    Patients treated with Sulfasalazine may generate falsely decreased results for ALT.    Cancer  01/30/2024 6.4  0.0 - 30.2 U/mL Final     CT chest abdomen pelvis w IV contrast  Narrative: Interpreted By:  Og Sadler,   STUDY:  CT CHEST ABDOMEN PELVIS W IV CONTRAST;  1/30/2024 12:20 pm      INDICATION:  Signs/Symptoms:restaging exam, ovarian cancer, on oral olaparib.      COMPARISON:  10/2023      ACCESSION NUMBER(S):  [id number]      ORDERING CLINICIAN:  PATRIA ASCENCIO      TECHNIQUE:  CT of the chest, abdomen, and pelvis was performed.  Contiguous axial  images were obtained at 3 mm slice thickness through the chest,  abdomen and pelvis. Coronal and sagittal reconstructions at 3 mm  slice thickness were performed. Intravenous contrast were  administered intravenously without immediate complication.      FINDINGS:  Emphysema. Bronchial thickening. Findings of smoking related airway  disease. 9 mm left lower lobe nodule on image 190 no new or growing  nodules. Stable hilar and mediastinal lymph nodes. No pericardial  effusion. Mild vascular calcification. No aneurysm. No axillary  adenopathy.      No suspicious osseous lesions.              ABDOMEN:  Gallstones. Stable hepatic cysts. Stable fatty of the liver. No  developing liver lesions. Unremarkable adrenal glands, pancreas, and  spleen. Robust diverticulosis. No diverticulitis. Uterus and ovaries  are not seen. No bowel obstruction. Unremarkable urinary bladder. No  hydronephrosis.  No enlarging retroperitoneal lymph nodes. No  suspicious osseous lesions demineralization. Mild vascular  calcification without aneurysm. No pelvic adenopathy. No developing  ascites.      Impression: Stable examination. No features of disease progression. Hysterectomy  changes. Diverticulosis. No diverticulitis      Cholelithiasis. Mild fatty of the liver.      MACRO:  None      Signed by: Og Sadler 1/31/2024 2:02 PM  Dictation workstation:   [id number]        Performance Status:  Asymptomatic    Assessment/Plan     Oncology History Overview Note   Tumor History:  - Tumor Markers: CA-19-9 <4, CA-125 249.3, CEA 1.6  - 10/19/21: IR tissue biopsy (HGS cancer) and paracentesis (11.5L)   - 11/8/21: Paracentesis (12.1 L)  - 11/21: Neoadjuvant Carbo/Taxol initiated, CT after 3 cycles with interval improvement in disease   - 2/14/22: Ex lap, BRY, removal of pelvic mass, omentectomy, mobilization of splenic and hepatic flexures, peritonectomy of the right hemidiaphragm, peritonectomy of the posterior cul de sac and bladder, over sewing of the small and large bowel, appendectomy,  HIPEC, argon ablation of sigmoid colon.  - Completed adjuvant chemotherapy in 4/2022  - [employer] testing with BRCA1/HRD positive, Germline testing negative    - Discussed plan for Olaparib, initiated May 2022     Malignant neoplasm of ovary (CMS/HCC)   7/27/2023 -  Chemotherapy    Olaparib, 28 Day Cycles - Gyn      10/11/2023 Initial Diagnosis    Malignant neoplasm of ovary (CMS/HCC)       63 y.o. 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 ECOG 1     # High grade serous ovarian cancer  - [facility] my choice with BRCA 1 mutation  - Discussed PARP inhibitor maintenance  - Currently taking olaparib, tolerating well and energy has improved  - Follow-up per chemo calendar   - We  "discussed signs and symptoms of recurrence.   - Continue at reduced dose, 250mg BID  - CT reviewed and negative   - Parp until 5/2024  - Plan is for continuing to follow up, would likely, discussed, stopping the PARP inhibitor early in the Spring    # Neurologic sxs  - \"brain fog\", changes to balance  - Improvement with dose reduction  - Referral to speech pathology     # Neuropathy  - Neuropathy is stable in feet, improved in fingers. She will continue seeing supportive oncology as needed.  - Supportive Onc recently added Topiramate 50mg and patient notices improvement, will continue to monitor  - Taking Duloxetine, Vitamin B6 and Pregabalin.     # Preventative Screening  - Overdue for mammography and colonoscopy     # Nausea  - Likely related to PARP inhibitor  - Improved with resuming antiemetics   - Improved with dose reduction      I spent 5 minutes virtually or in a telephone with this patient and/or family. More than 50% of the time was spent in counseling and/or coordination of care.        Scribe Attestation  By signing my name below, I, Sarah Fang   attest that this documentation has been prepared under the direction and in the presence of Debra Parker MD.     Provider Attestation - Scribe documentation    All medical record entries made by the Scribe were at my direction and personally dictated by me. I have reviewed the chart and agree that the record accurately reflects my personal performance of the history, physical exam, discussion and plan.    Debra Parker MD    "

## 2024-02-05 ENCOUNTER — EVALUATION (OUTPATIENT)
Dept: SPEECH THERAPY | Facility: CLINIC | Age: 64
End: 2024-02-05
Payer: MEDICAID

## 2024-02-05 DIAGNOSIS — R41.841 COGNITIVE COMMUNICATION DEFICIT: Primary | ICD-10-CM

## 2024-02-05 PROCEDURE — 92523 SPEECH SOUND LANG COMPREHEN: CPT | Mod: GN

## 2024-02-05 ASSESSMENT — PAIN SCALES - GENERAL: PAINLEVEL_OUTOF10: 0 - NO PAIN

## 2024-02-05 ASSESSMENT — PAIN - FUNCTIONAL ASSESSMENT: PAIN_FUNCTIONAL_ASSESSMENT: 0-10

## 2024-02-05 NOTE — PROGRESS NOTES
Speech-Language Pathology    SLP ADULT Outpatient Speech-Language Cognition Evaluation    Patient Name: Sharmila Huerta  MRN: [id number]  Today's Date: 2/5/2024   Time Calculation  Start Time: 0300  Stop Time: 0400  Time Calculation (min): 60 min      Current Problem:   1. Cognitive communication deficit  Referral to Speech Therapy            SLP Assessment:  SLP Assessment Results:      This date, patient presented with cognitive communication deficits marked by reduced accuracy and efficiency of word recall, reduced attention/focus (tangential communication, answering/speaking on the phone in the midst of testing), and reduced STM. Patient is recommended for outpatient Speech Therapy services targeting cognitive-linguistic rehabilitation, compensatory strategy training, and establishment of a HEP. Patient was in agreement with recommendations and goals/POC.    Prognosis: Good  Treatment Provided: No  Strengths: Motivation        SLP Plan:  Inpatient/Swing Bed or Outpatient: Outpatient  SLP Plan: Skilled SLP  SLP Frequency: 1x/week  Duration: 10 visits (*ongoing treatment beyond 10 visits if needs apparent and progress with established goals observed)  Discussed POC: Patient  Discussed Risks/Benefits: Yes  Patient/Caregiver Agreeable: Yes    GOALS:  LTG: Patient will independently utilize compensatory strategies/tools for cognitive-linguistic functions in order to improve effectiveness of communication and IADL management in the home, work, and community settings.     STGs:  1. Patient will perform word retrieval tasks for personally-relevant vocabulary/topics/tasks with >95% accuracy given minimal-0 cueing.  2. Patient will demonstrate knowledge and use of >= 2 compensatory strategies for impaired word recall independently.  3. Patient will independently employ trained word finding strategies during discourse level communication to avoid conversational breakdown in 4/5 opportunities.   4. Patient will independently  "utilize compensatory strategies/tools for memory (e.g. memory and planning systems, organizational tools, etc.) with 90% accuracy in order to improve effectiveness of IADL management in the home, work, and community settings.   5. Patient will independently utilize compensatory strategies/tools for attention with 90% accuracy in order to improve effectiveness of IADL management in the home, work, and community settings.         General Visit Information:  Reason for Referral:    Patient was referred for outpatient Speech Therapy evaluation 2/ cognitive communication changes s/p chemotherapy. Patient was diagnosed with ovarian cancer in . She reported that she is in remission and continues to take Lynparza twice daily. Patient stated that her physician believes her cognitive changes are linked to her chemo medication as there has been improvement in her neurologic s/s with reduction in dosage.    Today, patient reported the following deficits:    > COGNITION: \"I didn't realize I was forgetting as much as I was forgetting until last week;\" patient was pulled over last week while driving to PT appointment, unaware that her license plate tags had been  for over 6 months; reduced concentration (e.g. not interested in reading the way she once was, etc.); reduced memory (e.g. sometimes forgets to take medications, etc.)  > LANGUAGE: Impaired word recall (\"I know what I want to say, but I can't remember\"); ? mild spelling difficulty; ? reading comprehension deficits (may have to read material again to understanding; comprehension vs attention/memory)    Patient denied deficits of motor speech, auditory comprehension, voice, and swallowing. Patient reported that she feels she is depressed. She expressed interest in counseling but indicated she is not open to taking medication.    *Social Hx: lives with her sister; retired, worked as a medical aide until cancer diagnosis; reads for enjoyment    Referred By: " "Debra Parker  Past Medical History Relevant to Rehab: Ovarian CA with chemotherapy, depression, COPD, KIMBERLY, HLD  Number of Authorized Treatments : Pending authorization ([facility] Comm Plan)  Total Number of Visits : 1        Objective     Pain:  Pain Assessment: 0-10  Pain Score: 0 - No Pain      Myron Cognitive Assessment (MOCA):  -- Visuospatial / Executive: 4/5         (cube copy)  -- Naming: 3/3  -- Memory (Immediate, unscored): 4/5, 5/5  -- Attention: 4/6          (serial 7s)  -- Language: 2/3          (verbal fluency)  -- Abstraction: 2/2  -- Memory (Delayed, 5 minutes): 4/5 indep, 4/5 categ cues, 5/5 multi choice cues     > Memory Index Score: 14/15  -- Orientation: 6/6  ** TOTAL (\"normal:\" >26/30): 25/30        Outpatient Education:  Individual(s) Educated: Patient  Verbal Education: Results of evaluation, treatment recommendations  Risk and Benefits Discussed with Patient/Caregiver/Other: yes  Patient/Caregiver Demonstrated Understanding: yes  Plan of Care Discussed and Agreed Upon: yes  Patient Response to Education: Patient/Caregiver Verbalized Understanding of Information, Patient/Caregiver Asked Appropriate Questions  "

## 2024-02-08 ENCOUNTER — TELEPHONE (OUTPATIENT)
Dept: PALLIATIVE MEDICINE | Facility: CLINIC | Age: 64
End: 2024-02-08

## 2024-02-08 DIAGNOSIS — T45.1X5A CHEMOTHERAPY-INDUCED PERIPHERAL NEUROPATHY (MULTI): Primary | ICD-10-CM

## 2024-02-08 DIAGNOSIS — G62.0 CHEMOTHERAPY-INDUCED PERIPHERAL NEUROPATHY (MULTI): Primary | ICD-10-CM

## 2024-02-08 RX ORDER — PREGABALIN 200 MG/1
200 CAPSULE ORAL 3 TIMES DAILY
Qty: 90 CAPSULE | Refills: 2 | Status: SHIPPED | OUTPATIENT
Start: 2024-02-08 | End: 2024-03-07 | Stop reason: SDUPTHER

## 2024-02-08 NOTE — TELEPHONE ENCOUNTER
OARRS reviewed and no aberrancy noted. Prescription pended to provider to approve. Per Maine Boyle note on 1/3, plan is to continue Lyrica 200mg TID.

## 2024-02-13 ENCOUNTER — SOCIAL WORK (OUTPATIENT)
Dept: CASE MANAGEMENT | Facility: HOSPITAL | Age: 64
End: 2024-02-13

## 2024-02-15 ENCOUNTER — SPECIALTY PHARMACY (OUTPATIENT)
Dept: PHARMACY | Facility: CLINIC | Age: 64
End: 2024-02-15

## 2024-02-15 NOTE — PROGRESS NOTES
Patient requested to meet with HOLDEN to review changes with her insurance coverage. We met today in the SW suite at [facility]: [facility]. Patient insurance will change on 04.01.2024. She will have primary Medicare. Medicaid will likely continue as a supplemental coverage. HOLDEN provided patient with information on how to choose a Medicare plan (traditional versus advantage plan) and RX coverage. HOLDEN encouraged patient to review options with an . Patient to consider. Patient asked good questions. SW able to recertify patient's Medicaid coverage with the . Support offered. HOLDEN to follow.

## 2024-02-19 ENCOUNTER — TREATMENT (OUTPATIENT)
Dept: SPEECH THERAPY | Facility: CLINIC | Age: 64
End: 2024-02-19
Payer: MEDICAID

## 2024-02-19 DIAGNOSIS — R41.841 COGNITIVE COMMUNICATION DEFICIT: Primary | ICD-10-CM

## 2024-02-19 PROCEDURE — 92507 TX SP LANG VOICE COMM INDIV: CPT | Mod: GN

## 2024-02-19 ASSESSMENT — PAIN SCALES - GENERAL: PAINLEVEL_OUTOF10: 0 - NO PAIN

## 2024-02-19 ASSESSMENT — PAIN - FUNCTIONAL ASSESSMENT: PAIN_FUNCTIONAL_ASSESSMENT: 0-10

## 2024-02-19 NOTE — PROGRESS NOTES
Speech-Language Pathology    Outpatient Speech-Language Pathology Treatment     Patient Name: Sharmila Huerta  MRN: [id number]  Today's Date: 2/19/2024  Time Calculation  Start Time: 0100  Stop Time: 0145  Time Calculation (min): 45 min      Current Problem:   1. Cognitive communication deficit            SLP Assessment:  SLP TX Intervention Outcome: Making Progress Towards Goals  Treatment Provided: Yes, see Objective for details  Treatment Tolerance: Patient tolerated treatment well       Plan:  SLP TX Plan: Continue Plan of Care  SLP Plan: Skilled SLP  SLP Frequency: 1x per week  Discussed POC: Patient  Discussed Risks/Benefits: Yes, Patient  Patient/Caregiver Agreeable: Yes      Subjective   Patient well, arriving to and attending treatment independently today. This is her first visit since her initial evaluation on 2/5/2024.        General Visit Information:  Number of Authorized Treatments : 10 visits speech/language; 20 visits cognitive ([facility] Comm Plan)  Total Number of Visits : 2      Pain Assessment:   Pain Assessment: 0-10  Pain Score: 0 - No pain      Objective     GOALS:  LTG: Patient will independently utilize compensatory strategies/tools for cognitive-linguistic functions in order to improve effectiveness of communication and IADL management in the home, work, and community settings.     STGs:  1. Patient will perform word retrieval tasks for personally-relevant vocabulary/topics/tasks with >95% accuracy given minimal-0 cueing.  > ADDRESSED: Description/circumlocution was formally practiced. Patient named target words from therapist's descriptions with 100% accuracy independently.    2. Patient will demonstrate knowledge and use of >= 2 compensatory strategies for impaired word recall independently.  > ADDRESSED: Top 10 Word Finding Strategies for Aphasia (Tactus Therapy) were introduced today. SLP instructed patient on all 10 strategies and provided examples for each. Description/circumlocution was  formally practiced. Following SLP models, patient described target words with 90% accuracy independently, 100% accuracy given minimal addl prompting.    3. Patient will independently employ trained word finding strategies during discourse level communication to avoid conversational breakdown in 4/5 opportunities.  > NOT ADDRESSED.    4. Patient will independently utilize compensatory strategies/tools for memory (e.g. memory and planning systems, organizational tools, etc.) with 90% accuracy in order to improve effectiveness of IADL management in the home, work, and community settings.  > NOT ADDRESSED.    5. Patient will independently utilize compensatory strategies/tools for attention with 90% accuracy in order to improve effectiveness of IADL management in the home, work, and community settings.  > NOT ADDRESSED.      Outpatient Education:  Adult Outpatient Education  Individual(s) Educated: Patient  Verbal Education : Top 10 Word Finding Strategies for Aphasia (Tactus Therapy)  Risk and Benefits Discussed with Patient/Caregiver/Other: yes  Patient/Caregiver Demonstrated Understanding: yes  Plan of Care Discussed and Agreed Upon: yes

## 2024-02-29 ENCOUNTER — TELEPHONE (OUTPATIENT)
Dept: GYNECOLOGIC ONCOLOGY | Facility: HOSPITAL | Age: 64
End: 2024-02-29

## 2024-02-29 NOTE — TELEPHONE ENCOUNTER
Patient did not get her monthly labs drawn on Tuesday.  Call to patient and LM asking her to go today or tomorrow.

## 2024-03-01 ENCOUNTER — LAB (OUTPATIENT)
Dept: LAB | Facility: LAB | Age: 64
End: 2024-03-01
Payer: MEDICAID

## 2024-03-01 ENCOUNTER — TREATMENT (OUTPATIENT)
Dept: SPEECH THERAPY | Facility: CLINIC | Age: 64
End: 2024-03-01
Payer: MEDICAID

## 2024-03-01 DIAGNOSIS — R41.841 COGNITIVE COMMUNICATION DEFICIT: Primary | ICD-10-CM

## 2024-03-01 DIAGNOSIS — C56.9 MALIGNANT NEOPLASM OF OVARY, UNSPECIFIED LATERALITY (MULTI): ICD-10-CM

## 2024-03-01 LAB
ALBUMIN SERPL BCP-MCNC: 3.8 G/DL (ref 3.4–5)
ALP SERPL-CCNC: 60 U/L (ref 33–136)
ALT SERPL W P-5'-P-CCNC: 8 U/L (ref 7–45)
ANION GAP SERPL CALC-SCNC: 14 MMOL/L (ref 10–20)
AST SERPL W P-5'-P-CCNC: 10 U/L (ref 9–39)
BASOPHILS # BLD AUTO: 0.03 X10*3/UL (ref 0–0.1)
BASOPHILS NFR BLD AUTO: 0.4 %
BILIRUB SERPL-MCNC: 0.3 MG/DL (ref 0–1.2)
BUN SERPL-MCNC: 12 MG/DL (ref 6–23)
CALCIUM SERPL-MCNC: 9.5 MG/DL (ref 8.6–10.6)
CANCER AG125 SERPL-ACNC: 5.2 U/ML (ref 0–30.2)
CHLORIDE SERPL-SCNC: 105 MMOL/L (ref 98–107)
CO2 SERPL-SCNC: 24 MMOL/L (ref 21–32)
CREAT SERPL-MCNC: 0.95 MG/DL (ref 0.5–1.05)
EGFRCR SERPLBLD CKD-EPI 2021: 67 ML/MIN/1.73M*2
EOSINOPHIL # BLD AUTO: 0.05 X10*3/UL (ref 0–0.7)
EOSINOPHIL NFR BLD AUTO: 0.7 %
ERYTHROCYTE [DISTWIDTH] IN BLOOD BY AUTOMATED COUNT: 17.3 % (ref 11.5–14.5)
GLUCOSE SERPL-MCNC: 101 MG/DL (ref 74–99)
HCT VFR BLD AUTO: 30.6 % (ref 36–46)
HGB BLD-MCNC: 9.8 G/DL (ref 12–16)
IMM GRANULOCYTES # BLD AUTO: 0.02 X10*3/UL (ref 0–0.7)
IMM GRANULOCYTES NFR BLD AUTO: 0.3 % (ref 0–0.9)
LYMPHOCYTES # BLD AUTO: 2.23 X10*3/UL (ref 1.2–4.8)
LYMPHOCYTES NFR BLD AUTO: 30.7 %
MCH RBC QN AUTO: 28.7 PG (ref 26–34)
MCHC RBC AUTO-ENTMCNC: 32 G/DL (ref 32–36)
MCV RBC AUTO: 90 FL (ref 80–100)
MONOCYTES # BLD AUTO: 0.43 X10*3/UL (ref 0.1–1)
MONOCYTES NFR BLD AUTO: 5.9 %
NEUTROPHILS # BLD AUTO: 4.5 X10*3/UL (ref 1.2–7.7)
NEUTROPHILS NFR BLD AUTO: 62 %
NRBC BLD-RTO: 0 /100 WBCS (ref 0–0)
PLATELET # BLD AUTO: 284 X10*3/UL (ref 150–450)
POTASSIUM SERPL-SCNC: 4.2 MMOL/L (ref 3.5–5.3)
PROT SERPL-MCNC: 6.8 G/DL (ref 6.4–8.2)
RBC # BLD AUTO: 3.42 X10*6/UL (ref 4–5.2)
SODIUM SERPL-SCNC: 139 MMOL/L (ref 136–145)
WBC # BLD AUTO: 7.3 X10*3/UL (ref 4.4–11.3)

## 2024-03-01 PROCEDURE — 85025 COMPLETE CBC W/AUTO DIFF WBC: CPT

## 2024-03-01 PROCEDURE — 80053 COMPREHEN METABOLIC PANEL: CPT

## 2024-03-01 PROCEDURE — 36415 COLL VENOUS BLD VENIPUNCTURE: CPT

## 2024-03-01 PROCEDURE — 97130 THER IVNTJ EA ADDL 15 MIN: CPT | Mod: GN

## 2024-03-01 PROCEDURE — 86304 IMMUNOASSAY TUMOR CA 125: CPT

## 2024-03-01 PROCEDURE — 97129 THER IVNTJ 1ST 15 MIN: CPT | Mod: GN

## 2024-03-01 ASSESSMENT — PAIN SCALES - GENERAL: PAINLEVEL_OUTOF10: 0 - NO PAIN

## 2024-03-01 ASSESSMENT — PAIN - FUNCTIONAL ASSESSMENT: PAIN_FUNCTIONAL_ASSESSMENT: 0-10

## 2024-03-01 NOTE — PROGRESS NOTES
Speech-Language Pathology    Outpatient Speech-Language Pathology Treatment     Patient Name: Sharmila Huerta  MRN: [id number]  Today's Date: 3/1/2024  Time Calculation  Start Time: 0100  Stop Time: 0145  Time Calculation (min): 45 min      Current Problem:   1. Cognitive communication deficit            SLP Assessment:  SLP TX Intervention Outcome: Making Progress Towards Goals  Treatment Provided: Yes, see Objective for details  Treatment Tolerance: Patient tolerated treatment well      Plan:  SLP TX Plan: Continue Plan of Care  SLP Plan: Skilled SLP  SLP Frequency: 1x per week  Discussed POC: Patient  Discussed Risks/Benefits: Yes, Patient  Patient/Caregiver Agreeable: Yes      Subjective   Patient well, arriving to and attending treatment independently today. Patient acknowledged that she had forgotten her glasses at rehab following her last visit.       General Visit Information:  Number of Authorized Treatments : 10 visits speech/language; 20 visits cognitive ([facility] Comm Plan)  Total Number of Visits : 3      Pain Assessment:   Pain Assessment: 0-10  Pain Score: 0 - No pain      Objective     GOALS:  LTG: Patient will independently utilize compensatory strategies/tools for cognitive-linguistic functions in order to improve effectiveness of communication and IADL management in the home, work, and community settings.     STGs:  1. Patient will perform word retrieval tasks for personally-relevant vocabulary/topics/tasks with >95% accuracy given minimal-0 cueing.  > NOT ADDRESSED.    2. Patient will demonstrate knowledge and use of >= 2 compensatory strategies for impaired word recall independently.  > NOT ADDRESSED.    3. Patient will independently employ trained word finding strategies during discourse level communication to avoid conversational breakdown in 4/5 opportunities.  > NOT ADDRESSED.    4. Patient will independently utilize compensatory strategies/tools for memory (e.g. memory and planning systems,  "organizational tools, etc.) with 90% accuracy in order to improve effectiveness of IADL management in the home, work, and community settings.  > ADDRESSED: Completion of the Multifactorial Memory Questionnaire addressed.    Multifactorial Memory Questionnaire (MMQ):  > Memory Mistakes (\"sometimes\" or greater frequency): misplace daily items, not recall the name of someone just met or have known for some time, leave items behind you meant to take with you, forget what you were just about to do, difficulty coming up with a specific word, forget to pass on a message, forget what you were going to say, forget a birthday/anniversary/etc., forget a telephone number, forget to buy something, forget details of a recent conversation   *Patient indicated lack of accurate and efficient word finding was the area of most concern to her.  > Use of Memory Strategies (\"sometimes\" or greater use): use a timer/alarm, ask someone to help you remember, say something aloud to help remember it, use a routine to remember important things, make a list, mentally retrace steps    5. Patient will independently utilize compensatory strategies/tools for attention with 90% accuracy in order to improve effectiveness of IADL management in the home, work, and community settings.  > NOT ADDRESSED.      Outpatient Education:  Adult Outpatient Education  Individual(s) Educated: Patient  Verbal Education : Results of MMQ and ongoing treatment focus  Risk and Benefits Discussed with Patient/Caregiver/Other: yes  Patient/Caregiver Demonstrated Understanding: yes  Plan of Care Discussed and Agreed Upon: yes   "

## 2024-03-07 ENCOUNTER — TELEMEDICINE (OUTPATIENT)
Dept: PALLIATIVE MEDICINE | Facility: HOSPITAL | Age: 64
End: 2024-03-07
Payer: MEDICAID

## 2024-03-07 DIAGNOSIS — T45.1X5A CHEMOTHERAPY-INDUCED PERIPHERAL NEUROPATHY (MULTI): ICD-10-CM

## 2024-03-07 DIAGNOSIS — C56.9 MALIGNANT NEOPLASM OF OVARY, UNSPECIFIED LATERALITY (MULTI): ICD-10-CM

## 2024-03-07 DIAGNOSIS — Z51.5 PALLIATIVE CARE ENCOUNTER: Primary | ICD-10-CM

## 2024-03-07 DIAGNOSIS — R53.83 FATIGUE, UNSPECIFIED TYPE: ICD-10-CM

## 2024-03-07 DIAGNOSIS — F32.A DEPRESSION, UNSPECIFIED DEPRESSION TYPE: ICD-10-CM

## 2024-03-07 DIAGNOSIS — G62.0 CHEMOTHERAPY-INDUCED PERIPHERAL NEUROPATHY (MULTI): ICD-10-CM

## 2024-03-07 DIAGNOSIS — R11.0 NAUSEA: ICD-10-CM

## 2024-03-07 PROCEDURE — 99214 OFFICE O/P EST MOD 30 MIN: CPT | Performed by: NURSE PRACTITIONER

## 2024-03-07 RX ORDER — DULOXETIN HYDROCHLORIDE 30 MG/1
30 CAPSULE, DELAYED RELEASE ORAL NIGHTLY
Qty: 90 CAPSULE | Refills: 3 | Status: SHIPPED | OUTPATIENT
Start: 2024-03-07 | End: 2025-03-02

## 2024-03-07 RX ORDER — PREGABALIN 200 MG/1
200 CAPSULE ORAL 3 TIMES DAILY
Qty: 270 CAPSULE | Refills: 3 | Status: SHIPPED | OUTPATIENT
Start: 2024-03-07 | End: 2025-03-02

## 2024-03-07 RX ORDER — ZINC SULFATE 50(220)MG
50 CAPSULE ORAL 2 TIMES DAILY
Qty: 180 CAPSULE | Refills: 3 | Status: SHIPPED | OUTPATIENT
Start: 2024-03-07 | End: 2024-06-05 | Stop reason: SDUPTHER

## 2024-03-07 RX ORDER — TOPIRAMATE 100 MG/1
100 TABLET, FILM COATED ORAL 2 TIMES DAILY
Qty: 180 TABLET | Refills: 3 | Status: SHIPPED | OUTPATIENT
Start: 2024-03-07 | End: 2024-05-10 | Stop reason: SDUPTHER

## 2024-03-07 RX ORDER — DULOXETIN HYDROCHLORIDE 60 MG/1
60 CAPSULE, DELAYED RELEASE ORAL NIGHTLY
Qty: 90 CAPSULE | Refills: 3 | Status: SHIPPED | OUTPATIENT
Start: 2024-03-07 | End: 2025-03-02

## 2024-03-08 ENCOUNTER — DOCUMENTATION (OUTPATIENT)
Dept: SPEECH THERAPY | Facility: CLINIC | Age: 64
End: 2024-03-08

## 2024-03-08 NOTE — PROGRESS NOTES
Speech-Language Pathology                       Therapy Communication Note    Patient Name: hSarmila Huerta  MRN: [id number]  Today's Date: 3/8/2024     Discipline: Speech Language Pathology    Missed Visit Reason: Patient was a no show/no call to her scheduled Speech Therapy appointment this date.    Missed Time: No Show

## 2024-03-15 ENCOUNTER — TREATMENT (OUTPATIENT)
Dept: SPEECH THERAPY | Facility: CLINIC | Age: 64
End: 2024-03-15
Payer: MEDICAID

## 2024-03-15 DIAGNOSIS — R41.841 COGNITIVE COMMUNICATION DEFICIT: Primary | ICD-10-CM

## 2024-03-15 PROCEDURE — 92507 TX SP LANG VOICE COMM INDIV: CPT | Mod: GN

## 2024-03-15 ASSESSMENT — PAIN SCALES - GENERAL: PAINLEVEL_OUTOF10: 0 - NO PAIN

## 2024-03-15 ASSESSMENT — PAIN - FUNCTIONAL ASSESSMENT: PAIN_FUNCTIONAL_ASSESSMENT: 0-10

## 2024-03-15 NOTE — PROGRESS NOTES
Speech-Language Pathology    SLP Adult Outpatient Speech-Language Pathology Treatment     Patient Name: Sharmila Huerta  MRN: [id number]  Today's Date: 3/15/2024  Time Calculation  Start Time: 0145  Stop Time: 0230  Time Calculation (min): 45 min      Current Problem:   1. Cognitive communication deficit            SLP Assessment:  SLP TX Intervention Outcome: Making Progress Towards Goals  Treatment Provided: Yes, see Objective for details  Treatment Tolerance: Patient tolerated treatment well      Plan:  SLP TX Plan: Continue Plan of Care  SLP Plan: Skilled SLP  SLP Frequency: 1x per week  Discussed POC: Patient  Discussed Risks/Benefits: Yes, Patient  Patient/Caregiver Agreeable: Yes      Subjective   Patient well, arriving to and attending treatment independently today. Patient acknowledged that she had forgotten her glasses at rehab following her last visit.       General Visit Information:  Number of Authorized Treatments : 10 visits speech/language; 20 visits cognitive ([facility] Comm Plan)  Authorization Date Range: 2/5/2024 - 4/16/2024  Total Number of Visits : 4      Pain Assessment:  Pain Assessment: 0-10  Pain Score: 0 - No pain      Objective     GOALS:  LTG: Patient will independently utilize compensatory strategies/tools for cognitive-linguistic functions in order to improve effectiveness of communication and IADL management in the home, work, and community settings.     STGs:  1. Patient will perform word retrieval tasks for personally-relevant vocabulary/topics/tasks with >95% accuracy given minimal-0 cueing.  > ADDRESSED: At the discourse level, patient retrieved words with high accuracy and appropriate efficiency for fluid conversational exchange.    2. Patient will demonstrate knowledge and use of >= 2 compensatory strategies for impaired word recall independently.  > ADDRESSED: Top 10 Word Finding Strategies for Aphasia (Tactus Therapy) list was introduced to patient this date. All strategies were  reviewed together and examples were provided. Description/circumlocution was formally practiced. Following initial instruction on the technique and models from therapist, patient described target words for therapist to guess with 100% accuracy given minimal addl prompting.     3. Patient will independently employ trained word finding strategies during discourse level communication to avoid conversational breakdown in 4/5 opportunities.  > NOT ADDRESSED.    4. Patient will independently utilize compensatory strategies/tools for memory (e.g. memory and planning systems, organizational tools, etc.) with 90% accuracy in order to improve effectiveness of IADL management in the home, work, and community settings.  > NOT ADDRESSED.    5. Patient will independently utilize compensatory strategies/tools for attention with 90% accuracy in order to improve effectiveness of IADL management in the home, work, and community settings.  > NOT ADDRESSED.      Outpatient Education:  Adult Outpatient Education  Individual(s) Educated: Patient  Verbal Education : Top 10 Word Finding Strategies for Aphasia (Tactus Therapy)  Risk and Benefits Discussed with Patient/Caregiver/Other: yes  Patient/Caregiver Demonstrated Understanding: yes  Plan of Care Discussed and Agreed Upon: yes

## 2024-03-20 ENCOUNTER — SPECIALTY PHARMACY (OUTPATIENT)
Dept: PHARMACY | Facility: CLINIC | Age: 64
End: 2024-03-20

## 2024-03-20 PROCEDURE — RXMED WILLOW AMBULATORY MEDICATION CHARGE

## 2024-03-21 ENCOUNTER — PHARMACY VISIT (OUTPATIENT)
Dept: PHARMACY | Facility: CLINIC | Age: 64
End: 2024-03-21
Payer: MEDICAID

## 2024-03-21 ENCOUNTER — TELEPHONE (OUTPATIENT)
Dept: GYNECOLOGIC ONCOLOGY | Facility: HOSPITAL | Age: 64
End: 2024-03-21

## 2024-03-26 ENCOUNTER — DOCUMENTATION (OUTPATIENT)
Dept: SPEECH THERAPY | Facility: CLINIC | Age: 64
End: 2024-03-26

## 2024-03-26 NOTE — PROGRESS NOTES
Speech-Language Pathology                   Therapy Communication Note    Patient Name: Sharmila Huerta  MRN: [id number]  Today's Date: 3/26/2024     Discipline: Speech Language Pathology    Missed Visit Reason: Patient was a no show/no call to her scheduled Speech Therapy appointment this date.    Missed Time: No Show            DISPLAY PLAN FREE TEXT

## 2024-03-27 ENCOUNTER — TELEPHONE (OUTPATIENT)
Dept: GYNECOLOGIC ONCOLOGY | Facility: HOSPITAL | Age: 64
End: 2024-03-27

## 2024-03-27 ENCOUNTER — LAB (OUTPATIENT)
Dept: LAB | Facility: LAB | Age: 64
End: 2024-03-27
Payer: MEDICAID

## 2024-03-27 DIAGNOSIS — C56.9 MALIGNANT NEOPLASM OF OVARY, UNSPECIFIED LATERALITY (MULTI): ICD-10-CM

## 2024-03-27 LAB
ALBUMIN SERPL BCP-MCNC: 3.6 G/DL (ref 3.4–5)
ALP SERPL-CCNC: 67 U/L (ref 33–136)
ALT SERPL W P-5'-P-CCNC: 8 U/L (ref 7–45)
ANION GAP SERPL CALC-SCNC: 14 MMOL/L (ref 10–20)
AST SERPL W P-5'-P-CCNC: 9 U/L (ref 9–39)
BASOPHILS # BLD AUTO: 0.01 X10*3/UL (ref 0–0.1)
BASOPHILS NFR BLD AUTO: 0.2 %
BILIRUB SERPL-MCNC: 0.3 MG/DL (ref 0–1.2)
BUN SERPL-MCNC: 12 MG/DL (ref 6–23)
CALCIUM SERPL-MCNC: 9.5 MG/DL (ref 8.6–10.6)
CANCER AG125 SERPL-ACNC: 5.7 U/ML (ref 0–30.2)
CHLORIDE SERPL-SCNC: 103 MMOL/L (ref 98–107)
CO2 SERPL-SCNC: 26 MMOL/L (ref 21–32)
CREAT SERPL-MCNC: 1.01 MG/DL (ref 0.5–1.05)
EGFRCR SERPLBLD CKD-EPI 2021: 62 ML/MIN/1.73M*2
EOSINOPHIL # BLD AUTO: 0.06 X10*3/UL (ref 0–0.7)
EOSINOPHIL NFR BLD AUTO: 1 %
ERYTHROCYTE [DISTWIDTH] IN BLOOD BY AUTOMATED COUNT: 17.7 % (ref 11.5–14.5)
GLUCOSE SERPL-MCNC: 161 MG/DL (ref 74–99)
HCT VFR BLD AUTO: 29.4 % (ref 36–46)
HGB BLD-MCNC: 9.6 G/DL (ref 12–16)
IMM GRANULOCYTES # BLD AUTO: 0.04 X10*3/UL (ref 0–0.7)
IMM GRANULOCYTES NFR BLD AUTO: 0.6 % (ref 0–0.9)
LYMPHOCYTES # BLD AUTO: 1.29 X10*3/UL (ref 1.2–4.8)
LYMPHOCYTES NFR BLD AUTO: 20.9 %
MCH RBC QN AUTO: 29 PG (ref 26–34)
MCHC RBC AUTO-ENTMCNC: 32.7 G/DL (ref 32–36)
MCV RBC AUTO: 89 FL (ref 80–100)
MONOCYTES # BLD AUTO: 0.37 X10*3/UL (ref 0.1–1)
MONOCYTES NFR BLD AUTO: 6 %
NEUTROPHILS # BLD AUTO: 4.4 X10*3/UL (ref 1.2–7.7)
NEUTROPHILS NFR BLD AUTO: 71.3 %
NRBC BLD-RTO: 0 /100 WBCS (ref 0–0)
PLATELET # BLD AUTO: 297 X10*3/UL (ref 150–450)
POTASSIUM SERPL-SCNC: 4 MMOL/L (ref 3.5–5.3)
PROT SERPL-MCNC: 6.8 G/DL (ref 6.4–8.2)
RBC # BLD AUTO: 3.31 X10*6/UL (ref 4–5.2)
SODIUM SERPL-SCNC: 139 MMOL/L (ref 136–145)
WBC # BLD AUTO: 6.2 X10*3/UL (ref 4.4–11.3)

## 2024-03-27 PROCEDURE — 86304 IMMUNOASSAY TUMOR CA 125: CPT

## 2024-03-27 PROCEDURE — 85025 COMPLETE CBC W/AUTO DIFF WBC: CPT

## 2024-03-27 PROCEDURE — 36415 COLL VENOUS BLD VENIPUNCTURE: CPT

## 2024-03-27 PROCEDURE — 80053 COMPREHEN METABOLIC PANEL: CPT

## 2024-03-27 NOTE — TELEPHONE ENCOUNTER
Patient did not get labs done yesterday.  Called and spoke with patient's sister.  She will go for labs today.

## 2024-03-27 NOTE — PROGRESS NOTES
Patient ID: Sharmila Huerta is a 63 y.o. female.  Referring Physician: No referring provider defined for this encounter.  Primary Care Provider: Riana Dietrich MD    Subjective    PMH:  Ovarian cancer      Past Surgical History: BRY, BSO, Appendectomy, omentectomy      Family History:  Ovarian cancer in sister (treated at [facility]), passed 6 months ago at age 60. Her genetic testing was reportedly negative (unclear  if germline or tumor).   Sister received 3 cycles neoadjuvant Carbo/Taxol (2019-2020), followed by aborted debulking (exlap and omtx only on 2020), then Carbo/[place]/Georgina (3/2020-2020), Single agent Georgina (2020-2020), then NRG- (Cediranib/Olaparib) (10/2020-2021),  and finally single agent Doxil.  Otherwise denies a history of gyn related cancers including ovarian, endometrial, breast, pancreas, and GI cancers.      Social History: Tobacco user, denies EtOH or illicit drug use     OBGYN History:  The patient is a .  Entered menopause in her 50s.  Hx of fibroids dx at age 31, no known hx of abnl pap smears.  She never  used HRT      Screening:  -Pap smear: Approx age 49yrs  -Mammogram: Approx age 49yrs (normal)  -Colonoscopy: Approx age 49yrs (normal)    Objective    Telehealth visit       Interval:  63 y.o. 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 Now s/p 23 cycles Olaparib. Vi360=7.7          Hi Suzanne,   You are having a phone visit with this Keith patient tomorrow afternoon.  She is on Olaparib and will be starting her 24th course.  She has been doing great with this treatment.   She is supposed to get labs today.  She will be due for a CT scan/FUV with Debra after this cycle so I will pend you a CT order to sign.  I will mail patient her schedule.   Adeline     Interval:  Having some urinary leakage  when she gets out of bed or out of a chair.  States she saw urology, not taking medication.  Having nocturia.  Denies any nausea.  Denies any chest pain or shortness of breath.  Patient denies any vaginal bleeding.      Assessment/Plan 63 y.o. 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 S/P 23 cycles Olaparib.     Plan:   Continue with Olaparib Cycle #24, no dose modifications  Follow up per chemo calendar.       Oncology History Overview Note   Tumor History:  - Tumor Markers: CA-19-9 <4, CA-125 249.3, CEA 1.6  - 10/19/21: IR tissue biopsy (HGS cancer) and paracentesis (11.5L)   - 11/8/21: Paracentesis (12.1 L)  - 11/21: Neoadjuvant Carbo/Taxol initiated, CT after 3 cycles with interval improvement in disease   - 2/14/22: Ex lap, BRY, removal of pelvic mass, omentectomy, mobilization of splenic and hepatic flexures, peritonectomy of the right hemidiaphragm, peritonectomy of the posterior cul de sac and bladder, over sewing of the small and large bowel, appendectomy,  HIPEC, argon ablation of sigmoid colon.  - Completed adjuvant chemotherapy in 4/2022  - [employer] testing with BRCA1/HRD positive, Germline testing negative    - Discussed plan for Olaparib, initiated May 2022     Malignant neoplasm of ovary (CMS/HCC)   7/27/2023 -  Chemotherapy    Olaparib, 28 Day Cycles - Gyn      10/11/2023 Initial Diagnosis    Malignant neoplasm of ovary (CMS/HCC)           Suzanne Roa, EBONI-CNP      I performed this visit using real-time telehealth tools, including an audio/video connection between the patient and myself.  I spent 5 minutes virtually with this patient and/or family. More than 50% of the time was spent in counseling and/or coordination of care.

## 2024-03-28 ENCOUNTER — TELEMEDICINE (OUTPATIENT)
Dept: GYNECOLOGIC ONCOLOGY | Facility: CLINIC | Age: 64
End: 2024-03-28
Payer: MEDICAID

## 2024-03-28 DIAGNOSIS — Z51.11 ENCOUNTER FOR ANTINEOPLASTIC CHEMOTHERAPY: ICD-10-CM

## 2024-03-28 DIAGNOSIS — C56.9 MALIGNANT NEOPLASM OF OVARY, UNSPECIFIED LATERALITY (MULTI): Primary | ICD-10-CM

## 2024-03-28 PROCEDURE — 99213 OFFICE O/P EST LOW 20 MIN: CPT | Performed by: NURSE PRACTITIONER

## 2024-04-03 ENCOUNTER — TREATMENT (OUTPATIENT)
Dept: SPEECH THERAPY | Facility: CLINIC | Age: 64
End: 2024-04-03
Payer: MEDICAID

## 2024-04-03 DIAGNOSIS — R41.841 COGNITIVE COMMUNICATION DEFICIT: Primary | ICD-10-CM

## 2024-04-03 PROCEDURE — 92507 TX SP LANG VOICE COMM INDIV: CPT | Mod: GN

## 2024-04-03 NOTE — PROGRESS NOTES
Speech-Language Pathology    SLP Adult Outpatient Speech-Language Pathology Treatment     Patient Name: Sharmila Huerta  MRN: [id number]  Today's Date: 4/3/2024  Time Calculation  Start Time: 0145  Stop Time: 0230  Time Calculation (min): 45 min      Current Problem:   1. Cognitive communication deficit              SLP Assessment:  SLP TX Intervention Outcome: Making Progress Towards Goals  Treatment Provided: Yes, see Objective for details  Treatment Tolerance: Patient tolerated treatment well      Plan:  SLP TX Plan: Continue Plan of Care; x1 addl visit scheduled prior to discharge  SLP Plan: Skilled SLP  SLP Frequency: 1x per week  Discussed POC: Patient  Discussed Risks/Benefits: Yes, Patient  Patient/Caregiver Agreeable: Yes      Subjective   Patient well, arriving to and attending treatment independently today. Patient arrived early to her visit, thinking her appointment was at 1:00 vs 1:45.        General Visit Information:  Number of Authorized Treatments : 10 visits speech/language; 20 visits cognitive ([facility] Comm Plan)  Authorization Date Range: 2/5/2024 - 4/16/2024  Total Number of Visits : 5      Pain Assessment:  Pain Assessment: 0-10  Pain Score: 0 - No pain      Objective     GOALS:  LTG: Patient will independently utilize compensatory strategies/tools for cognitive-linguistic functions in order to improve effectiveness of communication and IADL management in the home, work, and community settings.     STGs:  1. Patient will perform word retrieval tasks for personally-relevant vocabulary/topics/tasks with >95% accuracy given minimal-0 cueing.  > ADDRESSED: At the discourse level, patient retrieved words with high accuracy and appropriate efficiency for fluid conversational exchange.    2. Patient will demonstrate knowledge and use of >= 2 compensatory strategies for impaired word recall independently.  > ADDRESSED: Top 10 Word Finding Strategies for Aphasia (Tactus Therapy) list was reviewed with  patient this date. Description/circumlocution was formally practiced. Following initial models from therapist, patient described target words for therapist to guess with 100% accuracy independently.     3. Patient will independently employ trained word finding strategies during discourse level communication to avoid conversational breakdown in 4/5 opportunities.  > ADDRESSED: Patient successfully utilized description strategy on x5 occasions without realizing she had done so. When pointed out to her, patient was pleased at her ability to use the strategy independently and to have gotten her point across.    4. Patient will independently utilize compensatory strategies/tools for memory (e.g. memory and planning systems, organizational tools, etc.) with 90% accuracy in order to improve effectiveness of IADL management in the home, work, and community settings.  > NOT ADDRESSED.    5. Patient will independently utilize compensatory strategies/tools for attention with 90% accuracy in order to improve effectiveness of IADL management in the home, work, and community settings.  > NOT ADDRESSED.      Outpatient Education:  Adult Outpatient Education  Individual(s) Educated: Patient  Verbal Education : Top 10 Word Finding Strategies for Aphasia (Tactus Therapy); discharge planning  Risk and Benefits Discussed with Patient/Caregiver/Other: yes  Patient/Caregiver Demonstrated Understanding: yes  Plan of Care Discussed and Agreed Upon: yes

## 2024-04-16 ENCOUNTER — DOCUMENTATION (OUTPATIENT)
Dept: SPEECH THERAPY | Facility: CLINIC | Age: 64
End: 2024-04-16
Payer: COMMERCIAL

## 2024-04-16 NOTE — PROGRESS NOTES
Speech-Language Pathology                 Therapy Communication Note    Patient Name: Sharmila Huerta  MRN: [id number]  Today's Date: 4/16/2024     Discipline: Speech Language Pathology    Missed Visit Reason: Patient was a no show/no call to her scheduled Speech Therapy appointment this date.    Missed Time: No Show

## 2024-04-18 ENCOUNTER — SPECIALTY PHARMACY (OUTPATIENT)
Dept: PHARMACY | Facility: CLINIC | Age: 64
End: 2024-04-18

## 2024-04-18 NOTE — PROGRESS NOTES
[facility] SPECIALTY PHARMACY PATIENT REASSESSMENT NOTE     [facility] SUPPLIED MEDICATION:  Lynparza      The patient's care will be continued with the referring prescriber.      TOLERANCE:   Have you experienced any side effects from this medication? Patient reports doing well on Lynparza. Doesn't report any new side-effects.    Are there any changes to current therapy regimen? No changes to current regimen. Patient start on Lynparza on 5/2022 (will likely have discussions regarding continuation of therapy in coming months.      EFFICACY:               Have you developed any new symptoms of your condition? No new symptoms reported  How do you feel your medication is affecting your disease state? Controlling disease well     GOALS:  Your goals of therapy are:  disease control      COMPLIANCE / ADHERENCE:  Have you had any unplanned missed doses? N  If yes, how often do you miss doses and is there a particular contributing reason? No major issues with adherence reported.   What barriers to adherence does the patient have? (Answer Y/N for all):  Patient has a difficult time remembering to take medications? N  Difficult administration technique? N  Medication cost? N  Patient does not think medication is beneficial? N  Other? N  What actions were taken to address barriers? N/A     Does the patient have any barriers to self-administration (including physical and mental)? None reported  List barriers:  N/A  Describe actions taken to mitigate barriers: N/A     GENERAL ASSESSMENT:  Are there any changes to your home medications, OTCs or supplements? N/A  Do you have any new allergies? N/A    Have you been diagnosed with any new medical conditions? N/A     PATIENT MANAGEMENT:  Do you have any questions regarding your medications, or care? N  Do you have any concerns with access to your medications?  No access concerns  How would you classify your Quality of Life on a scale of 1-10? N/A  How would you describe your  satisfaction with  Specialty Pharmacy services on a scale of 1-10?  No concerns with getting shipments  Do you have any additional suggestions to improve  Specialty Pharmacy services: N/A     IMPRESSION/PLAN:  Is patient high risk (potential patients:  pregnancy, geriatric, pediatric)?  N/A  Is laboratory follow-up needed? At discretion of provider  Is a clinical intervention needed? N/A  Next reassessment date? In 3 months (if continuing)   Additional comments:

## 2024-04-23 ENCOUNTER — HOSPITAL ENCOUNTER (OUTPATIENT)
Dept: RADIOLOGY | Facility: CLINIC | Age: 64
Discharge: HOME | End: 2024-04-23
Payer: COMMERCIAL

## 2024-04-23 ENCOUNTER — APPOINTMENT (OUTPATIENT)
Dept: LAB | Facility: LAB | Age: 64
End: 2024-04-23
Payer: MEDICAID

## 2024-04-23 DIAGNOSIS — C56.9 MALIGNANT NEOPLASM OF OVARY, UNSPECIFIED LATERALITY (MULTI): ICD-10-CM

## 2024-04-23 LAB
ALBUMIN SERPL BCP-MCNC: 3.6 G/DL (ref 3.4–5)
ALP SERPL-CCNC: 62 U/L (ref 33–136)
ALT SERPL W P-5'-P-CCNC: 8 U/L (ref 7–45)
ANION GAP SERPL CALC-SCNC: 12 MMOL/L (ref 10–20)
AST SERPL W P-5'-P-CCNC: 10 U/L (ref 9–39)
BASOPHILS # BLD AUTO: 0.02 X10*3/UL (ref 0–0.1)
BASOPHILS NFR BLD AUTO: 0.3 %
BILIRUB SERPL-MCNC: 0.5 MG/DL (ref 0–1.2)
BUN SERPL-MCNC: 13 MG/DL (ref 6–23)
CALCIUM SERPL-MCNC: 8.9 MG/DL (ref 8.6–10.6)
CHLORIDE SERPL-SCNC: 104 MMOL/L (ref 98–107)
CO2 SERPL-SCNC: 24 MMOL/L (ref 21–32)
CREAT SERPL-MCNC: 1.01 MG/DL (ref 0.5–1.05)
EGFRCR SERPLBLD CKD-EPI 2021: 62 ML/MIN/1.73M*2
EOSINOPHIL # BLD AUTO: 0.05 X10*3/UL (ref 0–0.7)
EOSINOPHIL NFR BLD AUTO: 0.9 %
ERYTHROCYTE [DISTWIDTH] IN BLOOD BY AUTOMATED COUNT: 18.1 % (ref 11.5–14.5)
GLUCOSE SERPL-MCNC: 97 MG/DL (ref 74–99)
HCT VFR BLD AUTO: 28.3 % (ref 36–46)
HGB BLD-MCNC: 9.1 G/DL (ref 12–16)
IMM GRANULOCYTES # BLD AUTO: 0.02 X10*3/UL (ref 0–0.7)
IMM GRANULOCYTES NFR BLD AUTO: 0.3 % (ref 0–0.9)
LYMPHOCYTES # BLD AUTO: 1.74 X10*3/UL (ref 1.2–4.8)
LYMPHOCYTES NFR BLD AUTO: 29.8 %
MCH RBC QN AUTO: 27.8 PG (ref 26–34)
MCHC RBC AUTO-ENTMCNC: 32.2 G/DL (ref 32–36)
MCV RBC AUTO: 87 FL (ref 80–100)
MONOCYTES # BLD AUTO: 0.58 X10*3/UL (ref 0.1–1)
MONOCYTES NFR BLD AUTO: 9.9 %
NEUTROPHILS # BLD AUTO: 3.42 X10*3/UL (ref 1.2–7.7)
NEUTROPHILS NFR BLD AUTO: 58.8 %
NRBC BLD-RTO: 0 /100 WBCS (ref 0–0)
PLATELET # BLD AUTO: 243 X10*3/UL (ref 150–450)
POTASSIUM SERPL-SCNC: 4.1 MMOL/L (ref 3.5–5.3)
PROT SERPL-MCNC: 7 G/DL (ref 6.4–8.2)
RBC # BLD AUTO: 3.27 X10*6/UL (ref 4–5.2)
SODIUM SERPL-SCNC: 136 MMOL/L (ref 136–145)
WBC # BLD AUTO: 5.8 X10*3/UL (ref 4.4–11.3)

## 2024-04-23 PROCEDURE — 85025 COMPLETE CBC W/AUTO DIFF WBC: CPT

## 2024-04-23 PROCEDURE — 80053 COMPREHEN METABOLIC PANEL: CPT

## 2024-04-23 PROCEDURE — 71260 CT THORAX DX C+: CPT | Performed by: RADIOLOGY

## 2024-04-23 PROCEDURE — 86304 IMMUNOASSAY TUMOR CA 125: CPT

## 2024-04-23 PROCEDURE — 36415 COLL VENOUS BLD VENIPUNCTURE: CPT

## 2024-04-23 PROCEDURE — 74177 CT ABD & PELVIS W/CONTRAST: CPT

## 2024-04-23 PROCEDURE — 2550000001 HC RX 255 CONTRASTS: Mod: SE | Performed by: NURSE PRACTITIONER

## 2024-04-23 PROCEDURE — 74177 CT ABD & PELVIS W/CONTRAST: CPT | Performed by: RADIOLOGY

## 2024-04-23 RX ADMIN — IOHEXOL 75 ML: 350 INJECTION, SOLUTION INTRAVENOUS at 17:19

## 2024-04-24 ENCOUNTER — SPECIALTY PHARMACY (OUTPATIENT)
Dept: PHARMACY | Facility: CLINIC | Age: 64
End: 2024-04-24

## 2024-04-24 DIAGNOSIS — C56.9 MALIGNANT NEOPLASM OF OVARY, UNSPECIFIED LATERALITY (MULTI): ICD-10-CM

## 2024-04-24 LAB — CANCER AG125 SERPL-ACNC: 5.2 U/ML (ref 0–30.2)

## 2024-04-24 NOTE — PROGRESS NOTES
Patient ID: Sharmila Huerta is a 63 y.o. female.  Referring Physician: No referring provider defined for this encounter.  Primary Care Provider: Riana Dietrich MD      Virtual or Telephone Consent    A telephone visit (audio only) between the patient (at the originating site) and the provider (at the distant site) was utilized to provide this telehealth service.   Verbal consent was requested and obtained from Sharmila Huerta on this date, 24 for a telehealth visit.     Subjective    Interval History:  Miss Doll is doing Ok, she does note that she has been having intermittent colds, she also notes that she has been urinating more frequently and having more urinary urgency, she also has some leakage. We reviewed that on her CT scan there is maybe some inflammation of her kidneys and urinary tract, will plan to a UA to better evaluate this, she is not having any pain or other issues, she is just nervous about the cancer. Notes that she is ready top stop the PARP inhibitor.    She denies fever, chills, chest pain, SOB, nausea, vomiting, diarrhea, constipation, dysuria, or any other concerning signs of symptoms.        PMH:  Ovarian cancer      Past Surgical History: BRY, BSO, Appendectomy, omentectomy      Family History:  Ovarian cancer in sister (treated at [facility]), passed 6 months ago at age 60. Her genetic testing was reportedly negative (unclear  if germline or tumor).   Sister received 3 cycles neoadjuvant Carbo/Taxol (2019-2020), followed by aborted debulking (exlap and omtx only on 2020), then Carbo/Chilton/Georgina (3/2020-2020), Single agent Georgina (2020-2020), then NRG- (Cediranib/Olaparib) (10/2020-2021),  and finally single agent Doxil.  Otherwise denies a history of gyn related cancers including ovarian, endometrial, breast, pancreas, and GI cancers.      Social History: Tobacco user, denies EtOH or illicit drug use     OBGYN History:  The patient is a .  Entered  menopause in her 50s.  Hx of fibroids dx at age 31, no known hx of abnl pap smears.  She never  used HRT      Screening:  -Pap smear: Approx age 49yrs  -Mammogram: Approx age 49yrs (normal)  -Colonoscopy: Approx age 49yrs (normal)    Objective    BSA: There is no height or weight on file to calculate BSA.  There were no vitals taken for this visit.     Physical Exam  Telemedicine on 04/25/2024   Component Date Value Ref Range Status    WBC 04/23/2024 5.8  4.4 - 11.3 x10*3/uL Final    nRBC 04/23/2024 0.0  0.0 - 0.0 /100 WBCs Final    RBC 04/23/2024 3.27 (L)  4.00 - 5.20 x10*6/uL Final    Hemoglobin 04/23/2024 9.1 (L)  12.0 - 16.0 g/dL Final    Hematocrit 04/23/2024 28.3 (L)  36.0 - 46.0 % Final    MCV 04/23/2024 87  80 - 100 fL Final    MCH 04/23/2024 27.8  26.0 - 34.0 pg Final    MCHC 04/23/2024 32.2  32.0 - 36.0 g/dL Final    RDW 04/23/2024 18.1 (H)  11.5 - 14.5 % Final    Platelets 04/23/2024 243  150 - 450 x10*3/uL Final    Neutrophils % 04/23/2024 58.8  40.0 - 80.0 % Final    Immature Granulocytes %, Automated 04/23/2024 0.3  0.0 - 0.9 % Final    Immature Granulocyte Count (IG) includes promyelocytes, myelocytes and metamyelocytes but does not include bands. Percent differential counts (%) should be interpreted in the context of the absolute cell counts (cells/UL).    Lymphocytes % 04/23/2024 29.8  13.0 - 44.0 % Final    Monocytes % 04/23/2024 9.9  2.0 - 10.0 % Final    Eosinophils % 04/23/2024 0.9  0.0 - 6.0 % Final    Basophils % 04/23/2024 0.3  0.0 - 2.0 % Final    Neutrophils Absolute 04/23/2024 3.42  1.20 - 7.70 x10*3/uL Final    Percent differential counts (%) should be interpreted in the context of the absolute cell counts (cells/uL).    Immature Granulocytes Absolute, Au* 04/23/2024 0.02  0.00 - 0.70 x10*3/uL Final    Lymphocytes Absolute 04/23/2024 1.74  1.20 - 4.80 x10*3/uL Final    Monocytes Absolute 04/23/2024 0.58  0.10 - 1.00 x10*3/uL Final    Eosinophils Absolute 04/23/2024 0.05  0.00 - 0.70  x10*3/uL Final    Basophils Absolute 04/23/2024 0.02  0.00 - 0.10 x10*3/uL Final    Glucose 04/23/2024 97  74 - 99 mg/dL Final    Sodium 04/23/2024 136  136 - 145 mmol/L Final    Potassium 04/23/2024 4.1  3.5 - 5.3 mmol/L Final    Chloride 04/23/2024 104  98 - 107 mmol/L Final    Bicarbonate 04/23/2024 24  21 - 32 mmol/L Final    Anion Gap 04/23/2024 12  10 - 20 mmol/L Final    Urea Nitrogen 04/23/2024 13  6 - 23 mg/dL Final    Creatinine 04/23/2024 1.01  0.50 - 1.05 mg/dL Final    eGFR 04/23/2024 62  >60 mL/min/1.73m*2 Final    Calculations of estimated GFR are performed using the 2021 CKD-EPI Study Refit equation without the race variable for the IDMS-Traceable creatinine methods.  https://jasn.asnjournals.org/content/early/2021/09/22/ASN.6724496438    Calcium 04/23/2024 8.9  8.6 - 10.6 mg/dL Final    Albumin 04/23/2024 3.6  3.4 - 5.0 g/dL Final    Alkaline Phosphatase 04/23/2024 62  33 - 136 U/L Final    Total Protein 04/23/2024 7.0  6.4 - 8.2 g/dL Final    AST 04/23/2024 10  9 - 39 U/L Final    Bilirubin, Total 04/23/2024 0.5  0.0 - 1.2 mg/dL Final    ALT 04/23/2024 8  7 - 45 U/L Final    Patients treated with Sulfasalazine may generate falsely decreased results for ALT.    Cancer  04/23/2024 5.2  0.0 - 30.2 U/mL Final     CT chest abdomen pelvis w IV contrast  Narrative: Interpreted By:  Nile Bonilla,  and Prasanna Snyder   STUDY:  CT CHEST ABDOMEN PELVIS W IV CONTRAST;  4/23/2024 5:22 pm      INDICATION:  History of high-grade serous ovarian cancer status post debulking  surgery, HIPEC, and adjuvant chemotherapy completed 4/22. Presents  for restaging scan on PARP inhibitor maintenance therapy.  is  stable at 5.2.      COMPARISON:  CT chest abdomen pelvis dated 01/30/2024      ACCESSION NUMBER(S):  [id number]      ORDERING CLINICIAN:  MICHAEL NASSAR      TECHNIQUE:  CT of the chest, abdomen, and pelvis was performed.  Contiguous axial  images were obtained at 3 mm slice thickness through the  chest,  abdomen and pelvis. Coronal and sagittal reconstructions at 3 mm  slice thickness were performed.  75 ml of contrast Omnipaque 350 were administered intravenously  without immediate complication.      FINDINGS:  CHEST:      LUNG/PLEURA/LARGE AIRWAYS:  The trachea and central airways are patent.      Background of mild centrilobular and paraseptal emphysematous  changes. Mild bibasilar atelectasis. No focal consolidation, pleural  effusion, or pneumothorax.      Stable 0.9 x 0.6 cm solid nodule in the left lung base (series 202,  image 221) and 0.5 cm pleural-based solid nodule in the right lower  lobe (Image 187). No new pulmonary nodules.      VESSELS:  Aorta and pulmonary arteries are normal caliber.  Mild  atherosclerotic changes are noted of the aorta and branching vessels.  No coronary artery calcifications are present.      HEART:  Normal size.  No pericardial effusion      MEDIASTINUM AND ROSIE:  No mediastinal, hilar or axillary lymphadenopathy is present.  The  esophagus is normal.      CHEST WALL AND LOWER NECK:  The soft tissues of the chest wall demonstrate no gross abnormality.  The visualized thyroid gland appears within normal limits.      ABDOMEN:      LIVER:  The liver is normal in size. Stable 3.1 x 2.6 cm cyst in segment 8  and punctate hypoattenuating lesions which are too small to  characterize in segment 7 and segment 4A (series 201 images 73 and  92. no new liver lesions.      BILE DUCTS:  The intrahepatic and extrahepatic ducts are not dilated.      GALLBLADDER:  Cholelithiasis. No gallbladder wall thickening, distention, or  pericholecystic fluid.      PANCREAS:  The pancreas appears unremarkable without evidence of ductal  dilatation or masses.      SPLEEN:  The spleen is normal in size without focal lesions.      ADRENAL GLANDS:  Smooth thickening of the bilateral adrenal glands similar to the  prior.      KIDNEYS AND URETERS:  The kidneys are normal in size and enhance  symmetrically. Stable  subcentimeter hypoattenuating lesion in the left kidney which is too  small to characterize. There is suggestion of new mild urothelial  thickening in the bilateral renal collecting systems, right greater  than left (series 201, image 113). Additionally there is new mild  perinephric stranding about the right kidney. No  hydroureteronephrosis or nephroureterolithiasis is identified.      PELVIS:      BLADDER:  The urinary bladder appears normal without abnormal wall thickening.      REPRODUCTIVE ORGANS:  Status post hysterectomy and bilateral salpingo oophorectomy. No  pelvic masses.      BOWEL:  The stomach is unremarkable.  The small bowel is not abnormally  dilated. The large bowel demonstrates multiple diverticula without  inflammation.  The appendix is not definitely visualized. There is  however no pericecal stranding or fluid.          VESSELS:  There is no aneurysmal dilatation of the abdominal aorta. There are  mild atherosclerotic calcifications of the abdominal aorta and its  branches. The IVC appears unremarkable.      PERITONEUM/RETROPERITONEUM/LYMPH NODES:  No ascites or free air, no fluid collection.  There are new prominent  subcentimeter retroperitoneal lymph nodes in the aortocaval region  measuring up to 0.9 cm in short axis.      BONE AND SOFT TISSUE:  No suspicious osseous lesions are identified. Postsurgical changes of  the ventral abdominal wall.      Impression: Ovarian cancer restaging scan compared to 01/30/2024:  1. New mild urothelial thickening in the bilateral renal collecting  systems as well as new mild right perinephric stranding, favored of  infectious or inflammatory etiology.  2. New prominent subcentimeter retroperitoneal lymph nodes which may  be reactive due to the above findings, however attention on follow-up  is recommended.  3. There is otherwise no evidence of new sites of metastatic disease  in the chest, abdomen, pelvis.      I personally reviewed  "the images/study and I agree with the findings  as stated by resident physician Dr. Victorino Mims.      MACRO:  None      Signed by: Nile Bonilla 4/24/2024 9:39 PM  Dictation workstation:   [id number]        Performance Status:  Asymptomatic    Assessment/Plan     Oncology History Overview Note   Tumor History:  - Tumor Markers: CA-19-9 <4, CA-125 249.3, CEA 1.6  - 10/19/21: IR tissue biopsy (HGS cancer) and paracentesis (11.5L)   - 11/8/21: Paracentesis (12.1 L)  - 11/21: Neoadjuvant Carbo/Taxol initiated, CT after 3 cycles with interval improvement in disease   - 2/14/22: Ex lap, BRY, removal of pelvic mass, omentectomy, mobilization of splenic and hepatic flexures, peritonectomy of the right hemidiaphragm, peritonectomy of the posterior cul de sac and bladder, over sewing of the small and large bowel, appendectomy,  HIPEC, argon ablation of sigmoid colon.  - Completed adjuvant chemotherapy in 4/2022  - [employer] testing with BRCA1/HRD positive, Germline testing negative    - Discussed plan for Olaparib, initiated May 2022     Malignant neoplasm of ovary (Multi)   7/27/2023 -  Chemotherapy    Olaparib, 28 Day Cycles - Gyn      10/11/2023 Initial Diagnosis    Malignant neoplasm of ovary (CMS/HCC)       63 y.o. female with high grade serous ovarian cancer s/p NACT (BRCA 1/ HRD on [place]), followed by interval debulking to NGR with HIPEC, now s/p adjuvant chemotherapy (completed 4/22), on PARP inhibitor. ECOG 1     # High grade serous ovarian cancer  - [facility] my choice with BRCA 1 mutation  - s/p PARP maintenance   - CT reviewed and negative   - We reviewed her CT scan which shows no evidence of disease, discussed plan for a follow up visit in 3 months with a  prior, we reviewed surveillance, she is now going to stop the PARP inhibitor as she has been on the Rx for 2 years.    # Neurologic sxs  - \"brain fog\", changes to balance  - Improvement with dose reduction  - Referral to speech " pathology     # Neuropathy  - Neuropathy is stable in feet, improved in fingers. She will continue seeing supportive oncology as needed.  - Supportive Onc recently added Topiramate 50mg and patient notices improvement, will continue to monitor  - Taking Duloxetine, Vitamin B6 and Pregabalin.     # Preventative Screening  - Overdue for mammography and colonoscopy     # Nausea  - Likely related to PARP inhibitor  - Improved with resuming antiemetics   - Improved with dose reduction    # Urinary inflammation on CT   - Plan for UA    I spent 15 minutes virtually or in a telephone with this patient and/or family. More than 50% of the time was spent in counseling and/or coordination of care.      Scribe Attestation  By signing my name below, I, Sarah Fang   attest that this documentation has been prepared under the direction and in the presence of Debra Parker MD.     Provider Attestation - Scribe documentation    All medical record entries made by the Scribe were at my direction and personally dictated by me. I have reviewed the chart and agree that the record accurately reflects my personal performance of the history, physical exam, discussion and plan.    Debra Parker MD

## 2024-04-25 ENCOUNTER — PHARMACY VISIT (OUTPATIENT)
Dept: PHARMACY | Facility: CLINIC | Age: 64
End: 2024-04-25
Payer: COMMERCIAL

## 2024-04-25 ENCOUNTER — TELEMEDICINE (OUTPATIENT)
Dept: GYNECOLOGIC ONCOLOGY | Facility: CLINIC | Age: 64
End: 2024-04-25
Payer: MEDICAID

## 2024-04-25 DIAGNOSIS — R39.9 UTI SYMPTOMS: Primary | ICD-10-CM

## 2024-04-25 DIAGNOSIS — R35.0 URINE FREQUENCY: ICD-10-CM

## 2024-04-25 DIAGNOSIS — C56.9 MALIGNANT NEOPLASM OF OVARY, UNSPECIFIED LATERALITY (MULTI): ICD-10-CM

## 2024-04-25 DIAGNOSIS — N39.3 STRESS INCONTINENCE, FEMALE: ICD-10-CM

## 2024-04-25 DIAGNOSIS — N39.41 URGE INCONTINENCE: ICD-10-CM

## 2024-04-25 DIAGNOSIS — Z51.11 ENCOUNTER FOR ANTINEOPLASTIC CHEMOTHERAPY: ICD-10-CM

## 2024-04-25 PROCEDURE — 99215 OFFICE O/P EST HI 40 MIN: CPT | Performed by: STUDENT IN AN ORGANIZED HEALTH CARE EDUCATION/TRAINING PROGRAM

## 2024-04-26 ENCOUNTER — SPECIALTY PHARMACY (OUTPATIENT)
Dept: PHARMACY | Facility: CLINIC | Age: 64
End: 2024-04-26

## 2024-04-29 ENCOUNTER — LAB (OUTPATIENT)
Dept: LAB | Facility: CLINIC | Age: 64
End: 2024-04-29
Payer: COMMERCIAL

## 2024-04-29 DIAGNOSIS — R39.9 UTI SYMPTOMS: ICD-10-CM

## 2024-04-29 LAB
APPEARANCE UR: CLEAR
BACTERIA #/AREA URNS AUTO: ABNORMAL /HPF
BILIRUB UR STRIP.AUTO-MCNC: NEGATIVE MG/DL
COLOR UR: COLORLESS
GLUCOSE UR STRIP.AUTO-MCNC: NEGATIVE MG/DL
KETONES UR STRIP.AUTO-MCNC: NEGATIVE MG/DL
LEUKOCYTE ESTERASE UR QL STRIP.AUTO: ABNORMAL
NITRITE UR QL STRIP.AUTO: NEGATIVE
PH UR STRIP.AUTO: 7 [PH]
PROT UR STRIP.AUTO-MCNC: NEGATIVE MG/DL
RBC # UR STRIP.AUTO: NEGATIVE /UL
RBC #/AREA URNS AUTO: ABNORMAL /HPF
SP GR UR STRIP.AUTO: 1
SQUAMOUS #/AREA URNS AUTO: ABNORMAL /HPF
UROBILINOGEN UR STRIP.AUTO-MCNC: 0.2 MG/DL
WBC #/AREA URNS AUTO: ABNORMAL /HPF

## 2024-04-29 PROCEDURE — 87086 URINE CULTURE/COLONY COUNT: CPT

## 2024-04-29 PROCEDURE — 81001 URINALYSIS AUTO W/SCOPE: CPT

## 2024-05-01 LAB — BACTERIA UR CULT: NORMAL

## 2024-05-02 ENCOUNTER — TELEPHONE (OUTPATIENT)
Dept: GYNECOLOGIC ONCOLOGY | Facility: HOSPITAL | Age: 64
End: 2024-05-02
Payer: COMMERCIAL

## 2024-05-02 NOTE — TELEPHONE ENCOUNTER
Call to patient to let her know that her urine culture is negative.  She currently denies symptoms of UTI.  She was told that since she is now in surveillance, Linda Nathan will now be her nurse contact for all future nursing needs.  She is aware that she has a FUV with Dr. Parker on 7/25 and she should get a  lab draw 2-3 days prior to her FUV with Dr. Parker.  She was mailed Linda's contact information as well as info regarding her FUV in July.

## 2024-05-10 ENCOUNTER — TELEPHONE (OUTPATIENT)
Dept: PALLIATIVE MEDICINE | Facility: CLINIC | Age: 64
End: 2024-05-10

## 2024-05-10 DIAGNOSIS — Z51.5 PALLIATIVE CARE ENCOUNTER: ICD-10-CM

## 2024-05-10 RX ORDER — TOPIRAMATE 100 MG/1
100 TABLET, FILM COATED ORAL 2 TIMES DAILY
Qty: 180 TABLET | Refills: 3 | Status: SHIPPED | OUTPATIENT
Start: 2024-05-10 | End: 2025-05-05

## 2024-05-24 DIAGNOSIS — R79.9 ABNORMAL FINDING OF BLOOD CHEMISTRY, UNSPECIFIED: ICD-10-CM

## 2024-05-24 RX ORDER — FLUTICASONE PROPIONATE 50 MCG
2 SPRAY, SUSPENSION (ML) NASAL DAILY
Qty: 16 G | Refills: 1 | Status: SHIPPED | OUTPATIENT
Start: 2024-05-24

## 2024-06-03 NOTE — PROGRESS NOTES
SUPPORTIVE AND PALLIATIVE ONCOLOGY OUTPATIENT FOLLOW-UP      SERVICE DATE: 6/5/2024    Subjective   HISTORY OF PRESENT ILLNESS: Sharmila Huerta is a 63 y.o. 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 She follows with Dr. Parker as her primary oncologist, currently pursuing maintenance therapy with Olaparib.  She has been referred to Supportive Oncology/Palliative Care for management of neuropathy, neoplasm related pain, loss of appetite, and fatigue.     Pain Assessment:  Pain Score:  0  Location:    Description:      Symptom Assessment:  Pain:a little - since stopping Olaparib, she has noticed an influx of joint pains, today complaining more specifically in her b/l shoulders. Taking Ibuprofen fairly sparingly fr relief  Numbness or Tingling in hands/feet/other: somewhat - continues in fingers and feet, endorses as long as she can keep up on her medications like she is supposed to, symptoms are generally tolerable. She does still struggle to do buttons or zippers at times and needs assistance  Sore Muscles/Spasms: none  Headache: none  Dizziness:none  Constipation: none  Diarrhea: none  Nausea: a little - on and off, takes either Ondansetron or Prochlorperazine 0-1x a daily  Vomiting: none  Lack of Appetite: none   Weight Loss: none  Taste changes: none  Dry Mouth: none  Pain in Mouth/Swallowing: none  Lack of Energy: a little - notes she needs to work on not allowing herself to lay around so much and get herself up and moving in the morning  Difficulty Sleeping: none  Worrying: none  Anxiety: none  Depression: none  Shortness of breath: none  Other: none      Information obtained from: chart review, interview of patient, and interview of family  ______________________________________________________________________        Objective     PHYSICAL EXAMINATION   Vital Signs:   Vital signs reviewed      9/19/2023     1:34 PM 10/12/2023     9:00 AM 10/31/2023    11:36 AM  "11/2/2023     2:12 PM 11/8/2023     3:39 PM 12/27/2023    12:31 PM 6/5/2024     1:12 PM   Vitals   Systolic 125  137 106 127 117 115   Diastolic 65  78 64 78 68 58   Heart Rate    85 88  89   Temp    36.6 °C (97.9 °F) 36.3 °C (97.3 °F)  36 °C (96.8 °F)   Resp    16 16  16   Height (in)  1.661 m (5' 5.39\")        Weight (lb) 250 258.6 257 259.26 255.95 253 243.8   BMI 41.6 kg/m2 42.52 kg/m2 42.25 kg/m2 42.63 kg/m2 42.08 kg/m2 41.6 kg/m2 40.08 kg/m2   BSA (m2) 2.28 m2 2.32 m2 2.32 m2 2.33 m2 2.31 m2 2.3 m2 2.26 m2   Visit Report   Report Report Report Report Report     Physical Exam  Vitals reviewed.   Constitutional:       Appearance: Normal appearance.   HENT:      Head: Normocephalic.   Cardiovascular:      Rate and Rhythm: Normal rate and regular rhythm.   Pulmonary:      Effort: Pulmonary effort is normal.   Abdominal:      General: Abdomen is flat. Bowel sounds are normal.      Palpations: Abdomen is soft.   Musculoskeletal:         General: Normal range of motion.   Skin:     General: Skin is warm and dry.   Neurological:      General: No focal deficit present.      Mental Status: She is alert and oriented to person, place, and time. Mental status is at baseline.   Psychiatric:         Mood and Affect: Mood normal.         Behavior: Behavior normal.         Thought Content: Thought content normal.         Judgment: Judgment normal.       ASSESSMENT/PLAN    Arthralgia Pain/CIPN  - Continue Pregabalin 200mg TID - encouraged to take as prescribed  - Continue Duloxetine 90mg once daily at bed  - Continue Topiramate 100mg BID  - Continue Acetaminophen 500mg [id number] PRN for pain - rare use  - Referral to [facility] - Dr Tan (6/5/2024) - interested in acupuncture for neuropathy and medical massage for arthralgias     Constipation  - Continue Docusate 100mg BID PRN  - Continue Miralax 17grams 1-2x per day PRN  - Continue Milk of Magnesia PRN for no BM in 2-3 days PRN  - Continue Probiotic Drink   "   Nausea  - Continue Ondansetron 8mg [id number] PRN  - Continue Prochlorperazine 10mg [id number]     SOB/Cough  - Continue Albuterol 90mcg/inh; 2 puffs [id number] PRN for SOB  - Continue Benzonatate 200mg TID PRN for cough     Depression/Insomnia  - Referrals sent to Music Therapy and Spiritual Therapy  - Email sent to  for assistance with LW/POA and other home needs  - Referral sent to Smoking Cessation Program   - Registered at [facility] - encouraged to attend for counseling services and assistance with grief coping in loss of her brother  - Continue Duloxetine 90mg at bed - discussed increase but patient declines  - Trazodone - no longer needed     Fatigue  - Continue to monitor with decreased dose of Olaparib   - Concern for depression    Next Follow-Up Visit:  Return to clinic in 3 months virtual    Signature and billing  Medical complexity was moderate level due to due to complexity of problems, extensive data review, and high risk of management/treatment.  Time was spent on the following: Prep Time, Time Directly with Patient/Family/Caregiver, Documentation Time. Total time spent: 40    Data  Diagnostic tests and information reviewed for today's visit:  Most recent labs and imaging results, Medications     Some elements copied from Palliative Care note on 3/7/24, the elements have been updated and all reflect current decision making from today, 6/5/2024.      Plan of Care discussed with: Patient and Daughter    SIGNATURE: EBONI Syed-CNP    Contact information:  Supportive and Palliative Oncology  Monday-Friday 8 AM-5 PM  Phone:  [phone], press option #5, then option #1.   Or Epic Secure Chat

## 2024-06-05 ENCOUNTER — OFFICE VISIT (OUTPATIENT)
Dept: PALLIATIVE MEDICINE | Facility: HOSPITAL | Age: 64
End: 2024-06-05
Payer: COMMERCIAL

## 2024-06-05 VITALS
RESPIRATION RATE: 16 BRPM | TEMPERATURE: 96.8 F | DIASTOLIC BLOOD PRESSURE: 58 MMHG | WEIGHT: 243.8 LBS | BODY MASS INDEX: 40.08 KG/M2 | SYSTOLIC BLOOD PRESSURE: 115 MMHG | OXYGEN SATURATION: 100 % | HEART RATE: 89 BPM

## 2024-06-05 DIAGNOSIS — Z51.5 PALLIATIVE CARE ENCOUNTER: Primary | ICD-10-CM

## 2024-06-05 DIAGNOSIS — M25.50 GENERALIZED JOINT PAIN: ICD-10-CM

## 2024-06-05 DIAGNOSIS — G62.0 CHEMOTHERAPY-INDUCED PERIPHERAL NEUROPATHY (MULTI): ICD-10-CM

## 2024-06-05 DIAGNOSIS — F32.A DEPRESSION, UNSPECIFIED DEPRESSION TYPE: ICD-10-CM

## 2024-06-05 DIAGNOSIS — R53.83 FATIGUE, UNSPECIFIED TYPE: ICD-10-CM

## 2024-06-05 DIAGNOSIS — C56.9 MALIGNANT NEOPLASM OF OVARY, UNSPECIFIED LATERALITY (MULTI): ICD-10-CM

## 2024-06-05 DIAGNOSIS — R11.0 NAUSEA: ICD-10-CM

## 2024-06-05 DIAGNOSIS — T45.1X5A CHEMOTHERAPY-INDUCED PERIPHERAL NEUROPATHY (MULTI): ICD-10-CM

## 2024-06-05 PROCEDURE — 99215 OFFICE O/P EST HI 40 MIN: CPT | Performed by: NURSE PRACTITIONER

## 2024-06-05 RX ORDER — ZINC SULFATE 50(220)MG
50 CAPSULE ORAL DAILY
Qty: 90 CAPSULE | Refills: 3 | Status: SHIPPED | OUTPATIENT
Start: 2024-06-05 | End: 2025-05-31

## 2024-06-05 ASSESSMENT — PAIN SCALES - GENERAL: PAINLEVEL_OUTOF10: 0-NO PAIN

## 2024-06-26 ENCOUNTER — APPOINTMENT (OUTPATIENT)
Dept: INTEGRATIVE MEDICINE | Facility: CLINIC | Age: 64
End: 2024-06-26
Payer: COMMERCIAL

## 2024-06-26 DIAGNOSIS — C56.9 MALIGNANT NEOPLASM OF OVARY, UNSPECIFIED LATERALITY (MULTI): Primary | ICD-10-CM

## 2024-06-26 PROCEDURE — 99205 OFFICE O/P NEW HI 60 MIN: CPT | Performed by: HOSPITALIST

## 2024-06-26 NOTE — PROGRESS NOTES
Patient ID: Sharmila Huerta is a 63 y.o. female.  Referring Physician: No referring provider defined for this encounter.  Primary Care Provider: Riana Dietrich MD    CANCER HISTORY:   63 y.o. 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 Olaparib - current treatment    INTEGRATIVE HISTORY:  Symptoms:  Urinary leakage    Neuropathy - Hands/feet -  numbness/pain/tingling - long-standing   On lyrica/cymbalta, helps with hands    Diet: not a big eater, chicken, beans, greens    PA: was not active during chemotherapy, used to be sleepy - now more energy  Walking    Sleep: sleeping ok    Stress: doing ok, lost brother last year - caused stress    Natural Products:    B6, B12 - not sure if helping    ROS:  no ha, visual symptoms, hearing loss  no sob, chest pain, palp  ROS o/w non contributory, please see HPI    Objective      PHYSICAL EXAM:  NAD, awake/alert  HEENT, NCAT, OP clear, no oral lesions  CTA bilat  RRR no mgr  Abd soft/nt/nd+bs  No c/c/e/ttp  Motor/sensory intact, CN 2-12 intact     RESULTS:  Lab Results   Component Value Date    WBC 5.8 04/23/2024    HGB 9.1 (L) 04/23/2024    HCT 28.3 (L) 04/23/2024     04/23/2024    CREATININE 1.01 04/23/2024    AST 10 04/23/2024     5.2 04/23/2024     5.7 03/27/2024     5.2 03/01/2024     6.4 01/30/2024     7.7 12/26/2023     7.0 12/01/2023     5.6 10/31/2023     7.3 09/27/2023     8.6 08/25/2023     5.5 07/25/2023     6.0 06/27/2023     5.6 05/30/2023     4.1 05/02/2023     5.2 04/04/2023     3.9 03/07/2023     4.1 01/31/2023     3.9 01/10/2023     4.1 12/13/2022     3.5 11/14/2022     3.8 10/18/2022     3.3 08/24/2022     4.3 07/27/2022     4.5 06/28/2022     5.3 05/16/2022      5.5 04/25/2022     6.4 04/04/2022     10.5 03/14/2022     35.3 (H) 01/18/2022     115.5 (H) 12/27/2021     254.5 (H) 11/29/2021     235.7 (H) 11/08/2021     249.3 (H) 10/18/2021       Assessment/Plan   63 y.o. 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 Olaparib     CANCER SPECIFIC RECCS:  LIFESTYLE:  Diet - 5-9 fruits/veg/day (7 veg to 2 fruits)  Cruciferous Vegetables - Brussel Sprouts, Kale, Broccoli, Cauliflower  PHYSICAL ACTIVITY 30 MIN/DAY - try walking often  Limit sugar    Natural Products:  I would stop b12/b6 - not sure it is helping  Vitamin D3 2000 Int units/day - taking    Reading:  [employer]  Cancer Fighting Kitchen    Websites:  [employer] for your Life  Cancerchoices.org    SYMPTOM MANAGEMENT:  Neuropathy:  Integrative Modalities to consider:  Acupuncture weekly in [facility] weekly x 6 weeks then reassess  Massage/Reflexology    Arthritis symptoms - acupuncture  Omega 3 supplement  Turmeric (Hoa)    Follow up:  4 months  IO symptom management acupuncture/massage    Thank you for consulting me in for this patient  Ney Tan MD

## 2024-07-03 ENCOUNTER — APPOINTMENT (OUTPATIENT)
Dept: PRIMARY CARE | Facility: CLINIC | Age: 64
End: 2024-07-03
Payer: COMMERCIAL

## 2024-07-03 VITALS — RESPIRATION RATE: 16 BRPM | DIASTOLIC BLOOD PRESSURE: 60 MMHG | SYSTOLIC BLOOD PRESSURE: 118 MMHG

## 2024-07-03 DIAGNOSIS — Z00.00 HEALTHCARE MAINTENANCE: ICD-10-CM

## 2024-07-03 DIAGNOSIS — Z23 NEED FOR PNEUMOCOCCAL 20-VALENT CONJUGATE VACCINATION: ICD-10-CM

## 2024-07-03 DIAGNOSIS — R79.9 ABNORMAL BLOOD CHEMISTRY: ICD-10-CM

## 2024-07-03 DIAGNOSIS — F17.200 TOBACCO DEPENDENCE: ICD-10-CM

## 2024-07-03 DIAGNOSIS — R53.83 FATIGUE, UNSPECIFIED TYPE: ICD-10-CM

## 2024-07-03 DIAGNOSIS — E78.00 HYPERCHOLESTEROLEMIA: ICD-10-CM

## 2024-07-03 DIAGNOSIS — Z00.00 WELCOME TO MEDICARE PREVENTIVE VISIT: Primary | ICD-10-CM

## 2024-07-03 DIAGNOSIS — Z12.31 SCREENING MAMMOGRAM FOR BREAST CANCER: ICD-10-CM

## 2024-07-03 PROCEDURE — 90677 PCV20 VACCINE IM: CPT | Performed by: INTERNAL MEDICINE

## 2024-07-03 PROCEDURE — G0402 INITIAL PREVENTIVE EXAM: HCPCS | Performed by: INTERNAL MEDICINE

## 2024-07-03 PROCEDURE — 99214 OFFICE O/P EST MOD 30 MIN: CPT | Performed by: INTERNAL MEDICINE

## 2024-07-03 PROCEDURE — G0009 ADMIN PNEUMOCOCCAL VACCINE: HCPCS | Performed by: INTERNAL MEDICINE

## 2024-07-03 ASSESSMENT — ENCOUNTER SYMPTOMS
OCCASIONAL FEELINGS OF UNSTEADINESS: 0
DEPRESSION: 0

## 2024-07-03 ASSESSMENT — ACTIVITIES OF DAILY LIVING (ADL)
DOING_HOUSEWORK: INDEPENDENT
GROCERY_SHOPPING: INDEPENDENT
BATHING: INDEPENDENT
TAKING_MEDICATION: INDEPENDENT
MANAGING_FINANCES: INDEPENDENT
DRESSING: INDEPENDENT

## 2024-07-03 NOTE — PATIENT INSTRUCTIONS
Pneumonia vaccine today (Prevnar 20)  Mammogram Please call our  [phone] to assist with scheduling     Blood work fasting - just water - can go to [facility]

## 2024-07-03 NOTE — PROGRESS NOTES
Subjective   Patient ID: Sharmila Huerta is a 63 y.o. female who presents for Annual Exam.    HPI  Patient in for a visit   Her memory is not as good as before  Still smoking get really close then go back    Patient comes in for a physical exam last one done over a year ago , doing well over-all with no particular complaints. Also is in for laboratory review and health maintenance update.  Updating family history as well.  Interval event - past medical history, surgical, social, and family history reviewed and updated.  Interval care -  Patient is    up to date with dental care.  Patient does     receive routine vision care.    Also here for [facility] Annual Wellness Visit     Review of Systems  General: Denies fever, chills, night sweats, changes in appetite or weight  ENT: Negative for ear pain, hearing loss, headache, difficulty swallowing, up to date with dental checks   Eyes: Negative for recent visual changes, up to date with eye exams  Dermatologic: Negative for new skin conditions, rash  Respiratory: Negative for paroxysmal nocturnal dyspnea, wheezing,shortness of breath, cough  Cardiovascular: Negative for chest pain, palpitations, or leg swelling  Gastrointestinal: Negative for nausea/vomiting, abdominal pain, changes in bowel habits  Genitourinary: Negative for dysuria, urgency, frequency  URINARY INCONTINENCE   Neurological: Negative for headaches, tremors, dizziness, memory loss, confusion, weakness, paresthesias  Psychiatric: Negative for sleep problems, anxiety, depression, conditions are stable  Endocrine: Negative for heat or cold intolerance, polyuria, polydipsia  Other:All systems have been reviewed and are negative except as previously noted.    Previous history  Past Medical History:   Diagnosis Date   • Malignant neoplasm of unspecified ovary (Multi) 06/27/2022    Malignant neoplasm of ovary, unspecified laterality     Past Surgical History:   Procedure Laterality Date   • OTHER SURGICAL HISTORY   06/20/2022    Hysterectomy abdominal   • US GUIDED ABDOMINAL PARACENTESIS  11/8/2021    US GUIDED ABDOMINAL PARACENTESIS 11/8/2021 [facility] ANCILLARY LEGACY   • US GUIDED ABDOMINAL PARACENTESIS  11/30/2021    US GUIDED ABDOMINAL PARACENTESIS 11/30/2021 [facility] ANCILLARY LEGACY     Social History     Tobacco Use   • Smoking status: Every Day     Types: Cigarettes   • Smokeless tobacco: Never   Vaping Use   • Vaping status: Never Used   Substance Use Topics   • Alcohol use: Not Currently     Family History   Problem Relation Name Age of Onset   • Ovarian cancer Sister     • Lung cancer Brother     • No Known Problems Daughter     • No Known Problems Other Child      Allergies   Allergen Reactions   • Penicillins Unknown   • Capital With Codeine [Acetaminophen-Codeine] Palpitations     Current Outpatient Medications   Medication Instructions   • acetaminophen (TYLENOL) 1,000 mg, oral, Every 6 hours PRN, for pain   • albuterol sulfate (Proair Digihaler) 90 mcg/actuation aero powdr breath act w/sensor inhaler 1-2 puffs, inhalation,  every 4-6 hours, As Needed for shortness of breath or wheezing<BR>   • apixaban (Eliquis) 2.5 mg tablet oral   • benzonatate (Tessalon) 200 mg capsule oral   • docusate sodium (Colace) 100 mg capsule oral   • DULoxetine (CYMBALTA) 30 mg, oral, Nightly, In addition to 60mg capsule for total of 90mg total   • DULoxetine (CYMBALTA) 60 mg, oral, Nightly, In addition to 30mg capsule for total of 90mg   • fluticasone (Flonase) 50 mcg/actuation nasal spray 2 sprays, Each Nostril, Daily   • ibuprofen (IBU) 600 mg, oral, Every 6 hours PRN,  for pain   • magnesium oxide (Mag-Ox) 400 mg (241.3 mg magnesium) tablet 2 tablets, oral, 2 times daily   • olaparib (Lynparza) 100 mg tablet TAKE ONE TABLET OF 100MG AND ONE TABLET OF 150MG (250MG TOTAL DOSE) BY MOUTH 2 TIMES A DAY   • olaparib (Lynparza) 150 mg tablet TAKE ONE TABLET OF 100MG AND ONE TABLET OF 150MG (250MG TOTAL DOSE) BY MOUTH 2 TIMES A DAY   • olaparib  (LYNPARZA) 300 mg, oral, 2 times daily   • polyethylene glycol (Gavilax) 17 gram/dose powder oral   • pregabalin (LYRICA) 200 mg, oral, 3 times daily   • tiotropium (Spiriva Respimat) 2.5 mcg/actuation inhaler 2 puffs, inhalation, Daily, Go to ER if chest pain or palpitations or feeling worse or visual/eye problems or urinary retention<BR>   • topiramate (TOPAMAX) 100 mg, oral, 2 times daily   • varenicline (Chantix) 0.5 mg tablet oral, Take with full glass of water. Take 1 1 MG X 42 Daily    • zinc sulfate (Zincate) 220 (50 Zn) MG capsule 50 mg of elemental zinc, oral, Daily,  for change in taste       Objective       Physical Exam  Vital Signs: as recorded above  General: Well groomed, well nourished   Orientation:  Alert , oriented to time, place , and person   Mood and Affect:  Cooperative , no apparent distress normal affect  Skin: Good color, good turgor  Eyes: Extra ocular muscle movements intact, anicteric sclerae  Neck: Supple, full range of movement  Chest: Normal breath sounds, normal chest wall exam, symmetric, good air entry, clear to auscultation  Heart: Regular rate and rhythm, without murmur, gallop, or rubs  Abdomen soft nontender no masses felt no hepatosplenomegaly, no rebound or guarding  BACK:  no CTLS spine tenderness, no flank tenderness  Extremities: full range of movement  bilateral UE and bilateral LE,  no lower extremity edema  Neurological: Alert, oriented, cranial nerves II-XII intact except for visual acuity  Sensation:  Intact   Gait: normal steady      Assessment/Plan   Sharmila Huerta is a 63 y.o. female who presents for the concerns below:    Problem List Items Addressed This Visit    None  HYPERCHOLESTEROLEMIA PLAN:  elevated not on medications  Follow low cholesterol diet, encouraged high omega 3 fatty acid intake in diet, exercise, weight control. . Will Obtain AST/ALT, fasting lipid profile if not done within past 3-6 months . Coenzyme Q10 200 mg a day if able to help increase  HDL.  TOBACCO USE DISORDER PLAN:  Counseled on strategies to help quit smoking.  Do not buy favorite cigarettes .   Explained risks of continuing.  Recommended medication and suggested over the counter patches. Gum, lozenges that may help patient quit.  HYPERGLYCEMIA PLAN:Stable without  medications currently  , continue to follow Diabetic diet,  urine microalbumin utd , ophthalmology exam.  Need Podiatry checks periodically.    ASSESSMENT AND PLAN: Patient on examination is in good health , concerns above, screening blood tests to screen for high cholesterol, diabetes, thyroid,  Patient should be taking enough calcium in a balanced diet or supplements to total 1200 mg a day in divided doses unless with history of specific types of kidney stones. Vitamin D 800-1000 iu a day, check levels since  last lab work done showed slightly low levels.  . For Female Patients Only:PAP test yearly as per gynecology   Preventive Medicine: colon cancer screening by age 45 if no family history, balanced diet, and exercise as discussed. Seat belt use for injury prevention  Substance use and /or tobacco use not applicable / counseled when applicable. Immunizations TD  up to date.  Yearly flu vaccine unless contraindicated . More than 50% of office visit time spent counseling the patient, questions were answered. Complete physical examination in a year. Patient knows either has access to  Mobi to see results and messages regarding these or will call us if cannot see them    Annual Wellness Visit  the risks and benefits of influenza vaccination were discussed with the patient, influenza vaccine is up to date this year  the risks and benefits of Prevnar 20  vaccination were discussed with the patient, Prevnar 20 vaccine is    up to date  the risks and benefits of pneumonia vaccination were discussed with the patient, pneumonia vaccine is     up to date   the risks and benefits of  Shingrix  vaccination were discussed with the  patient, Shingrix  vaccine is   not     up to date there is a shortage of the vaccine  the risks and benefits of tetanus vaccination were discussed with the patient, tetanus vaccine is      up to date   Cardiovascular screening and counseling the risk and benefits of screening were discussed counseling on maintaining a healthy diet and due for lipid panel  Breast cancer screening and counseling , the risks and benefits of screening were discussed with the patient, and screening is current     order is in  Cervical cancer screening and counseling , the risks and benefits of screening were discussed with the patient, and screening is current     order is in  Osteoporosis screening and counseling was given on obtaining adequate amounts of calcium and vitamin D on a daily basis and weight bearing exercise such as walking  , the risks and benefits of screening were discussed with the patient, and screening is current     order is in  Colorectal cancer screening and counseling; the risks and benefits of screening were discussed with the patient, colon cancer screening is     up to date , order is in   Abdominal aortic aneurysm screening and counseling,  the risks and benefits of screening were discussed with the patient, screening is not indicated   Visual acuity / Glaucoma screening and counseling , the risks and benefits of vision screening were discussed with the patient, screening is current   HIV screening and Counseling, the risks and benefits of screening were discussed with the patient, screening is not indicated   Advance directive planning : needs to be updated information forms given to patient .  complete and up  to date   Other Preventive Counseling Provided :  fall risk reduction  seat belt use, sunscreen use , and increasing physical activity .  Patient Discussion:  plan discussed with the patient and follow-up visit needed       Discussed with:   Return in : mid October      Portions of this note were  generated using digital voice recognition software, and may contain grammatical errors       Riana Dietrich MD  07/03/24  2:31 PM

## 2024-07-22 ENCOUNTER — TELEPHONE (OUTPATIENT)
Dept: PALLIATIVE MEDICINE | Facility: CLINIC | Age: 64
End: 2024-07-22

## 2024-07-22 ENCOUNTER — LAB (OUTPATIENT)
Dept: LAB | Facility: LAB | Age: 64
End: 2024-07-22
Payer: COMMERCIAL

## 2024-07-22 DIAGNOSIS — E78.00 HYPERCHOLESTEROLEMIA: ICD-10-CM

## 2024-07-22 DIAGNOSIS — R11.0 NAUSEA: ICD-10-CM

## 2024-07-22 DIAGNOSIS — Z00.00 WELCOME TO MEDICARE PREVENTIVE VISIT: ICD-10-CM

## 2024-07-22 DIAGNOSIS — C56.9 MALIGNANT NEOPLASM OF OVARY, UNSPECIFIED LATERALITY (MULTI): ICD-10-CM

## 2024-07-22 DIAGNOSIS — R53.83 FATIGUE, UNSPECIFIED TYPE: ICD-10-CM

## 2024-07-22 DIAGNOSIS — R06.02 SOB (SHORTNESS OF BREATH): ICD-10-CM

## 2024-07-22 DIAGNOSIS — R79.9 ABNORMAL BLOOD CHEMISTRY: ICD-10-CM

## 2024-07-22 LAB
ALBUMIN SERPL BCP-MCNC: 3.9 G/DL (ref 3.4–5)
ALP SERPL-CCNC: 70 U/L (ref 33–136)
ALT SERPL W P-5'-P-CCNC: 8 U/L (ref 7–45)
ANION GAP SERPL CALC-SCNC: 13 MMOL/L (ref 10–20)
AST SERPL W P-5'-P-CCNC: 9 U/L (ref 9–39)
BASOPHILS # BLD AUTO: 0.02 X10*3/UL (ref 0–0.1)
BASOPHILS NFR BLD AUTO: 0.4 %
BILIRUB SERPL-MCNC: 0.4 MG/DL (ref 0–1.2)
BUN SERPL-MCNC: 13 MG/DL (ref 6–23)
CALCIUM SERPL-MCNC: 9.3 MG/DL (ref 8.6–10.6)
CANCER AG125 SERPL-ACNC: 5.9 U/ML (ref 0–30.2)
CHLORIDE SERPL-SCNC: 110 MMOL/L (ref 98–107)
CHOLEST SERPL-MCNC: 169 MG/DL (ref 0–199)
CHOLESTEROL/HDL RATIO: 4.9
CO2 SERPL-SCNC: 23 MMOL/L (ref 21–32)
CREAT SERPL-MCNC: 0.87 MG/DL (ref 0.5–1.05)
EGFRCR SERPLBLD CKD-EPI 2021: 74 ML/MIN/1.73M*2
EOSINOPHIL # BLD AUTO: 0.08 X10*3/UL (ref 0–0.7)
EOSINOPHIL NFR BLD AUTO: 1.4 %
ERYTHROCYTE [DISTWIDTH] IN BLOOD BY AUTOMATED COUNT: 18.3 % (ref 11.5–14.5)
EST. AVERAGE GLUCOSE BLD GHB EST-MCNC: 143 MG/DL
GLUCOSE SERPL-MCNC: 151 MG/DL (ref 74–99)
HBA1C MFR BLD: 6.6 %
HCT VFR BLD AUTO: 33.3 % (ref 36–46)
HDLC SERPL-MCNC: 34.4 MG/DL
HGB BLD-MCNC: 10.5 G/DL (ref 12–16)
IMM GRANULOCYTES # BLD AUTO: 0.02 X10*3/UL (ref 0–0.7)
IMM GRANULOCYTES NFR BLD AUTO: 0.4 % (ref 0–0.9)
LDLC SERPL CALC-MCNC: 102 MG/DL
LYMPHOCYTES # BLD AUTO: 1.82 X10*3/UL (ref 1.2–4.8)
LYMPHOCYTES NFR BLD AUTO: 32.2 %
MCH RBC QN AUTO: 26.6 PG (ref 26–34)
MCHC RBC AUTO-ENTMCNC: 31.5 G/DL (ref 32–36)
MCV RBC AUTO: 84 FL (ref 80–100)
MONOCYTES # BLD AUTO: 0.35 X10*3/UL (ref 0.1–1)
MONOCYTES NFR BLD AUTO: 6.2 %
NEUTROPHILS # BLD AUTO: 3.37 X10*3/UL (ref 1.2–7.7)
NEUTROPHILS NFR BLD AUTO: 59.4 %
NON HDL CHOLESTEROL: 135 MG/DL (ref 0–149)
NRBC BLD-RTO: 0 /100 WBCS (ref 0–0)
PLATELET # BLD AUTO: 261 X10*3/UL (ref 150–450)
POTASSIUM SERPL-SCNC: 4.2 MMOL/L (ref 3.5–5.3)
PROT SERPL-MCNC: 7 G/DL (ref 6.4–8.2)
RBC # BLD AUTO: 3.95 X10*6/UL (ref 4–5.2)
SODIUM SERPL-SCNC: 142 MMOL/L (ref 136–145)
TRIGL SERPL-MCNC: 164 MG/DL (ref 0–149)
TSH SERPL-ACNC: 0.74 MIU/L (ref 0.44–3.98)
VLDL: 33 MG/DL (ref 0–40)
WBC # BLD AUTO: 5.7 X10*3/UL (ref 4.4–11.3)

## 2024-07-22 PROCEDURE — 83036 HEMOGLOBIN GLYCOSYLATED A1C: CPT

## 2024-07-22 PROCEDURE — 80053 COMPREHEN METABOLIC PANEL: CPT

## 2024-07-22 PROCEDURE — 84443 ASSAY THYROID STIM HORMONE: CPT

## 2024-07-22 PROCEDURE — 36415 COLL VENOUS BLD VENIPUNCTURE: CPT

## 2024-07-22 PROCEDURE — 86304 IMMUNOASSAY TUMOR CA 125: CPT

## 2024-07-22 PROCEDURE — 80061 LIPID PANEL: CPT

## 2024-07-22 PROCEDURE — 85025 COMPLETE CBC W/AUTO DIFF WBC: CPT

## 2024-07-22 RX ORDER — METOCLOPRAMIDE 10 MG/1
10 TABLET ORAL EVERY 6 HOURS PRN
Qty: 120 TABLET | Refills: 2 | Status: SHIPPED | OUTPATIENT
Start: 2024-07-22 | End: 2024-10-20

## 2024-07-22 RX ORDER — ALBUTEROL SULFATE 90 UG/1
2 INHALANT RESPIRATORY (INHALATION) EVERY 8 HOURS PRN
Qty: 1 EACH | Refills: 1 | Status: SHIPPED | OUTPATIENT
Start: 2024-07-22

## 2024-07-22 NOTE — TELEPHONE ENCOUNTER
Patient last seen by EBONI Lucero on 6/5 with plan to continue albuterol 90mcg/inh, 2 puff q8h PRN. Her note does not mention reglan, but per janis Berumen to send in reglan 10mg q6h PRN. Prescriptions pended to provider for approval.

## 2024-07-24 NOTE — PROGRESS NOTES
Patient ID: Sharmila Huerta is a 64 y.o. female.  Referring Physician: No referring provider defined for this encounter.  Primary Care Provider: Riana Dietrich MD      Subjective    Interval History:  Per patient she is improving overall but does note ongoing fatigue and urinary incontinence, bowel movements, bladder and appetite are normal, denies abdominal pain and lower extremity edema otherwise requested a mammogram order.    She denies fever, chills, chest pain, SOB, nausea, vomiting, diarrhea, constipation, dysuria, or any other concerning signs of symptoms.        PMH:  Ovarian cancer      Past Surgical History: BRY, BSO, Appendectomy, omentectomy      Family History:  Ovarian cancer in sister (treated at [facility]), passed 6 months ago at age 60. Her genetic testing was reportedly negative (unclear  if germline or tumor).   Sister received 3 cycles neoadjuvant Carbo/Taxol (2019-2020), followed by aborted debulking (exlap and omtx only on 2020), then Carbo/McMinn/Georgina (3/2020-2020), Single agent Georgina (2020-2020), then NRG- (Cediranib/Olaparib) (10/2020-2021),  and finally single agent Doxil.  Otherwise denies a history of gyn related cancers including ovarian, endometrial, breast, pancreas, and GI cancers.      Social History: Tobacco user, denies EtOH or illicit drug use     OBGYN History:  The patient is a .  Entered menopause in her 50s.  Hx of fibroids dx at age 31, no known hx of abnl pap smears.  She never  used HRT      Screening:  -Pap smear: Approx age 49yrs  -Mammogram: Approx age 49yrs (normal)  -Colonoscopy: Approx age 49yrs (normal)    Objective    BSA: There is no height or weight on file to calculate BSA.  There were no vitals taken for this visit.     Physical Exam  Constitutional:       General: She is not in acute distress.     Appearance: Normal appearance. She is not toxic-appearing.   HENT:      Head: Normocephalic.      Mouth/Throat:      Mouth: Mucous  membranes are moist.      Pharynx: Oropharynx is clear.   Eyes:      Extraocular Movements: Extraocular movements intact.      Conjunctiva/sclera: Conjunctivae normal.      Pupils: Pupils are equal, round, and reactive to light.   Cardiovascular:      Rate and Rhythm: Normal rate and regular rhythm.      Heart sounds: Normal heart sounds. No murmur heard.     No friction rub. No gallop.   Pulmonary:      Effort: Pulmonary effort is normal.      Breath sounds: Normal breath sounds. No wheezing or rhonchi.   Abdominal:      General: Bowel sounds are normal. There is no distension.      Palpations: Abdomen is soft.      Tenderness: There is no abdominal tenderness.   Musculoskeletal:         General: Normal range of motion.      Cervical back: Normal range of motion.   Skin:     General: Skin is warm.   Neurological:      General: No focal deficit present.      Mental Status: She is alert and oriented to person, place, and time.   Psychiatric:         Mood and Affect: Mood normal.         Behavior: Behavior normal.       Lab on 07/22/2024   Component Date Value Ref Range Status    Cancer  07/22/2024 5.9  0.0 - 30.2 U/mL Final    WBC 07/22/2024 5.7  4.4 - 11.3 x10*3/uL Final    nRBC 07/22/2024 0.0  0.0 - 0.0 /100 WBCs Final    RBC 07/22/2024 3.95 (L)  4.00 - 5.20 x10*6/uL Final    Hemoglobin 07/22/2024 10.5 (L)  12.0 - 16.0 g/dL Final    Hematocrit 07/22/2024 33.3 (L)  36.0 - 46.0 % Final    MCV 07/22/2024 84  80 - 100 fL Final    MCH 07/22/2024 26.6  26.0 - 34.0 pg Final    MCHC 07/22/2024 31.5 (L)  32.0 - 36.0 g/dL Final    RDW 07/22/2024 18.3 (H)  11.5 - 14.5 % Final    Platelets 07/22/2024 261  150 - 450 x10*3/uL Final    Neutrophils % 07/22/2024 59.4  40.0 - 80.0 % Final    Immature Granulocytes %, Automated 07/22/2024 0.4  0.0 - 0.9 % Final    Immature Granulocyte Count (IG) includes promyelocytes, myelocytes and metamyelocytes but does not include bands. Percent differential counts (%) should be interpreted  in the context of the absolute cell counts (cells/UL).    Lymphocytes % 07/22/2024 32.2  13.0 - 44.0 % Final    Monocytes % 07/22/2024 6.2  2.0 - 10.0 % Final    Eosinophils % 07/22/2024 1.4  0.0 - 6.0 % Final    Basophils % 07/22/2024 0.4  0.0 - 2.0 % Final    Neutrophils Absolute 07/22/2024 3.37  1.20 - 7.70 x10*3/uL Final    Percent differential counts (%) should be interpreted in the context of the absolute cell counts (cells/uL).    Immature Granulocytes Absolute, Au* 07/22/2024 0.02  0.00 - 0.70 x10*3/uL Final    Lymphocytes Absolute 07/22/2024 1.82  1.20 - 4.80 x10*3/uL Final    Monocytes Absolute 07/22/2024 0.35  0.10 - 1.00 x10*3/uL Final    Eosinophils Absolute 07/22/2024 0.08  0.00 - 0.70 x10*3/uL Final    Basophils Absolute 07/22/2024 0.02  0.00 - 0.10 x10*3/uL Final    Glucose 07/22/2024 151 (H)  74 - 99 mg/dL Final    Sodium 07/22/2024 142  136 - 145 mmol/L Final    Potassium 07/22/2024 4.2  3.5 - 5.3 mmol/L Final    Chloride 07/22/2024 110 (H)  98 - 107 mmol/L Final    Bicarbonate 07/22/2024 23  21 - 32 mmol/L Final    Anion Gap 07/22/2024 13  10 - 20 mmol/L Final    Urea Nitrogen 07/22/2024 13  6 - 23 mg/dL Final    Creatinine 07/22/2024 0.87  0.50 - 1.05 mg/dL Final    eGFR 07/22/2024 74  >60 mL/min/1.73m*2 Final    Calculations of estimated GFR are performed using the 2021 CKD-EPI Study Refit equation without the race variable for the IDMS-Traceable creatinine methods.  https://jasn.asnjournals.org/content/early/2021/09/22/ASN.3775278958    Calcium 07/22/2024 9.3  8.6 - 10.6 mg/dL Final    Albumin 07/22/2024 3.9  3.4 - 5.0 g/dL Final    Alkaline Phosphatase 07/22/2024 70  33 - 136 U/L Final    Total Protein 07/22/2024 7.0  6.4 - 8.2 g/dL Final    AST 07/22/2024 9  9 - 39 U/L Final    Bilirubin, Total 07/22/2024 0.4  0.0 - 1.2 mg/dL Final    ALT 07/22/2024 8  7 - 45 U/L Final    Patients treated with Sulfasalazine may generate falsely decreased results for ALT.    Cholesterol 07/22/2024 169  0 - 199  mg/dL Final          Age      Desirable   Borderline High   High     0-19 Y     0 - 169       170 - 199     >/= 200    20-24 Y     0 - 189       190 - 224     >/= 225         >24 Y     0 - 199       200 - 239     >/= 240   **All ranges are based on fasting samples. Specific   therapeutic targets will vary based on patient-specific   cardiac risk.    Pediatric guidelines reference:Pediatrics 2011, 128(S5).Adult guidelines reference: NCEP ATPIII Guidelines,JARED 2001, 258:2486-97    Venipuncture immediately after or during the administration of Metamizole may lead to falsely low results. Testing should be performed immediately prior to Metamizole dosing.    HDL-Cholesterol 07/22/2024 34.4  mg/dL Final      Age       Very Low   Low     Normal    High    0-19 Y    < 35      < 40     40-45     ----  20-24 Y    ----     < 40      >45      ----        >24 Y      ----     < 40     40-60      >60      Cholesterol/HDL Ratio 07/22/2024 4.9   Final      Ref Values  Desirable  < 3.4  High Risk  > 5.0    LDL Calculated 07/22/2024 102 (H)  <=99 mg/dL Final                                Near   Borderline      AGE      Desirable  Optimal    High     High     Very High     0-19 Y     0 - 109     ---    110-129   >/= 130     ----    20-24 Y     0 - 119     ---    120-159   >/= 160     ----      >24 Y     0 -  99   100-129  130-159   160-189     >/=190      VLDL 07/22/2024 33  0 - 40 mg/dL Final    Triglycerides 07/22/2024 164 (H)  0 - 149 mg/dL Final       Age         Desirable   Borderline High   High     Very High   0 D-90 D    19 - 174         ----         ----        ----  91 D- 9 Y     0 -  74        75 -  99     >/= 100      ----    10-19 Y     0 -  89        90 - 129     >/= 130      ----    20-24 Y     0 - 114       115 - 149     >/= 150      ----         >24 Y     0 - 149       150 - 199    200- 499    >/= 500    Venipuncture immediately after or during the administration of Metamizole may lead to falsely low results. Testing  should be performed immediately prior to Metamizole dosing.    Non HDL Cholesterol 07/22/2024 135  0 - 149 mg/dL Final          Age       Desirable   Borderline High   High     Very High     0-19 Y     0 - 119       120 - 144     >/= 145    >/= 160    20-24 Y     0 - 149       150 - 189     >/= 190      ----         >24 Y    30 mg/dL above LDL Cholesterol goal      Thyroid Stimulating Hormone 07/22/2024 0.74  0.44 - 3.98 mIU/L Final    Hemoglobin A1C 07/22/2024 6.6 (H)  see below % Final    Hemoglobin variant detected which does not interfere with determination of Hemoglobin A1c. Hemoglobin identification can be ordered to characterize the variant if clinically indicated.    Estimated Average Glucose 07/22/2024 143  Not Established mg/dL Final     CT chest abdomen pelvis w IV contrast  Narrative: Interpreted By:  Nile Bonilla and O'Connor Gregory   STUDY:  CT CHEST ABDOMEN PELVIS W IV CONTRAST;  4/23/2024 5:22 pm      INDICATION:  History of high-grade serous ovarian cancer status post debulking  surgery, HIPEC, and adjuvant chemotherapy completed 4/22. Presents  for restaging scan on PARP inhibitor maintenance therapy.  is  stable at 5.2.      COMPARISON:  CT chest abdomen pelvis dated 01/30/2024      ACCESSION NUMBER(S):  [id number]      ORDERING CLINICIAN:  MICHAEL NASSAR      TECHNIQUE:  CT of the chest, abdomen, and pelvis was performed.  Contiguous axial  images were obtained at 3 mm slice thickness through the chest,  abdomen and pelvis. Coronal and sagittal reconstructions at 3 mm  slice thickness were performed.  75 ml of contrast Omnipaque 350 were administered intravenously  without immediate complication.      FINDINGS:  CHEST:      LUNG/PLEURA/LARGE AIRWAYS:  The trachea and central airways are patent.      Background of mild centrilobular and paraseptal emphysematous  changes. Mild bibasilar atelectasis. No focal consolidation, pleural  effusion, or pneumothorax.      Stable 0.9 x 0.6 cm  solid nodule in the left lung base (series 202,  image 221) and 0.5 cm pleural-based solid nodule in the right lower  lobe (Image 187). No new pulmonary nodules.      VESSELS:  Aorta and pulmonary arteries are normal caliber.  Mild  atherosclerotic changes are noted of the aorta and branching vessels.  No coronary artery calcifications are present.      HEART:  Normal size.  No pericardial effusion      MEDIASTINUM AND ROSIE:  No mediastinal, hilar or axillary lymphadenopathy is present.  The  esophagus is normal.      CHEST WALL AND LOWER NECK:  The soft tissues of the chest wall demonstrate no gross abnormality.  The visualized thyroid gland appears within normal limits.      ABDOMEN:      LIVER:  The liver is normal in size. Stable 3.1 x 2.6 cm cyst in segment 8  and punctate hypoattenuating lesions which are too small to  characterize in segment 7 and segment 4A (series 201 images 73 and  92. no new liver lesions.      BILE DUCTS:  The intrahepatic and extrahepatic ducts are not dilated.      GALLBLADDER:  Cholelithiasis. No gallbladder wall thickening, distention, or  pericholecystic fluid.      PANCREAS:  The pancreas appears unremarkable without evidence of ductal  dilatation or masses.      SPLEEN:  The spleen is normal in size without focal lesions.      ADRENAL GLANDS:  Smooth thickening of the bilateral adrenal glands similar to the  prior.      KIDNEYS AND URETERS:  The kidneys are normal in size and enhance symmetrically. Stable  subcentimeter hypoattenuating lesion in the left kidney which is too  small to characterize. There is suggestion of new mild urothelial  thickening in the bilateral renal collecting systems, right greater  than left (series 201, image 113). Additionally there is new mild  perinephric stranding about the right kidney. No  hydroureteronephrosis or nephroureterolithiasis is identified.      PELVIS:      BLADDER:  The urinary bladder appears normal without abnormal wall  thickening.      REPRODUCTIVE ORGANS:  Status post hysterectomy and bilateral salpingo oophorectomy. No  pelvic masses.      BOWEL:  The stomach is unremarkable.  The small bowel is not abnormally  dilated. The large bowel demonstrates multiple diverticula without  inflammation.  The appendix is not definitely visualized. There is  however no pericecal stranding or fluid.          VESSELS:  There is no aneurysmal dilatation of the abdominal aorta. There are  mild atherosclerotic calcifications of the abdominal aorta and its  branches. The IVC appears unremarkable.      PERITONEUM/RETROPERITONEUM/LYMPH NODES:  No ascites or free air, no fluid collection.  There are new prominent  subcentimeter retroperitoneal lymph nodes in the aortocaval region  measuring up to 0.9 cm in short axis.      BONE AND SOFT TISSUE:  No suspicious osseous lesions are identified. Postsurgical changes of  the ventral abdominal wall.      Impression: Ovarian cancer restaging scan compared to 01/30/2024:  1. New mild urothelial thickening in the bilateral renal collecting  systems as well as new mild right perinephric stranding, favored of  infectious or inflammatory etiology.  2. New prominent subcentimeter retroperitoneal lymph nodes which may  be reactive due to the above findings, however attention on follow-up  is recommended.  3. There is otherwise no evidence of new sites of metastatic disease  in the chest, abdomen, pelvis.      I personally reviewed the images/study and I agree with the findings  as stated by resident physician Dr. Victorino Mims.      MACRO:  None      Signed by: Nile Bonilla 4/24/2024 9:39 PM  Dictation workstation:   [id number]        Performance Status:  Asymptomatic    Assessment/Plan     Oncology History Overview Note   Tumor History:  - Tumor Markers: CA-19-9 <4, CA-125 249.3, CEA 1.6  - 10/19/21: IR tissue biopsy (HGS cancer) and paracentesis (11.5L)   - 11/8/21: Paracentesis (12.1 L)  - 11/21: Neoadjuvant  "Carbo/Taxol initiated, CT after 3 cycles with interval improvement in disease   - 2/14/22: Ex lap, BRY, removal of pelvic mass, omentectomy, mobilization of splenic and hepatic flexures, peritonectomy of the right hemidiaphragm, peritonectomy of the posterior cul de sac and bladder, over sewing of the small and large bowel, appendectomy,  HIPEC, argon ablation of sigmoid colon.  - Completed adjuvant chemotherapy in 4/2022  - [employer] testing with BRCA1/HRD positive, Germline testing negative    - Discussed plan for Olaparib, initiated May 2022-5/2024     Malignant neoplasm of ovary (Multi)   7/27/2023 -  Chemotherapy    Olaparib, 28 Day Cycles - Gyn      10/11/2023 Initial Diagnosis    Malignant neoplasm of ovary (CMS/HCC)       64 y.o. 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 ECOG 1     # High grade serous ovarian cancer  - [facility] my choice with BRCA 1 mutation  - s/p PARP maintenance   - plan for surveillance visit in 3 months with  prior    # Neurologic sxs  - \"brain fog\", changes to balance  - Improvement with dose reduction  - Referral to speech pathology     # Neuropathy  - Neuropathy is stable in feet, improved in fingers. She will continue seeing supportive oncology as needed.  - Supportive Onc recently added Topiramate 50mg and patient notices improvement, will continue to monitor  - Taking Duloxetine, Vitamin B6 and Pregabalin.     # Preventative Screening  - Overdue for mammography and colonoscopy     # Nausea  - Likely related to PARP inhibitor  - Improved with resuming antiemetics   - Improved with dose reduction      Scribe Attestation  By signing my name below, I, Sarah Fang   attest that this documentation has been prepared under the direction and in the presence of Debra SANCHEZ" MD Keith.     Provider Attestation - Scribe documentation    All medical record entries made by the Scribe were at my direction and personally dictated by me. I have reviewed the chart and agree that the record accurately reflects my personal performance of the history, physical exam, discussion and plan.    Debra Parker MD

## 2024-07-25 ENCOUNTER — OFFICE VISIT (OUTPATIENT)
Dept: GYNECOLOGIC ONCOLOGY | Facility: CLINIC | Age: 64
End: 2024-07-25
Payer: COMMERCIAL

## 2024-07-25 VITALS
DIASTOLIC BLOOD PRESSURE: 82 MMHG | BODY MASS INDEX: 40.96 KG/M2 | OXYGEN SATURATION: 97 % | RESPIRATION RATE: 18 BRPM | TEMPERATURE: 97.2 F | WEIGHT: 249.12 LBS | SYSTOLIC BLOOD PRESSURE: 118 MMHG | HEART RATE: 100 BPM

## 2024-07-25 DIAGNOSIS — Z85.43 ENCOUNTER FOR FOLLOW-UP SURVEILLANCE OF OVARIAN CANCER: ICD-10-CM

## 2024-07-25 DIAGNOSIS — T45.1X5A CHEMOTHERAPY-INDUCED NEUROPATHY (MULTI): ICD-10-CM

## 2024-07-25 DIAGNOSIS — Z08 ENCOUNTER FOR FOLLOW-UP SURVEILLANCE OF OVARIAN CANCER: ICD-10-CM

## 2024-07-25 DIAGNOSIS — C56.9 MALIGNANT NEOPLASM OF OVARY, UNSPECIFIED LATERALITY (MULTI): Primary | ICD-10-CM

## 2024-07-25 DIAGNOSIS — G62.0 CHEMOTHERAPY-INDUCED NEUROPATHY (MULTI): ICD-10-CM

## 2024-07-25 PROCEDURE — 99214 OFFICE O/P EST MOD 30 MIN: CPT | Performed by: STUDENT IN AN ORGANIZED HEALTH CARE EDUCATION/TRAINING PROGRAM

## 2024-07-25 PROCEDURE — 1126F AMNT PAIN NOTED NONE PRSNT: CPT | Performed by: STUDENT IN AN ORGANIZED HEALTH CARE EDUCATION/TRAINING PROGRAM

## 2024-07-25 PROCEDURE — 1123F ACP DISCUSS/DSCN MKR DOCD: CPT | Performed by: STUDENT IN AN ORGANIZED HEALTH CARE EDUCATION/TRAINING PROGRAM

## 2024-07-25 PROCEDURE — 1159F MED LIST DOCD IN RCRD: CPT | Performed by: STUDENT IN AN ORGANIZED HEALTH CARE EDUCATION/TRAINING PROGRAM

## 2024-07-25 PROCEDURE — 1160F RVW MEDS BY RX/DR IN RCRD: CPT | Performed by: STUDENT IN AN ORGANIZED HEALTH CARE EDUCATION/TRAINING PROGRAM

## 2024-07-25 ASSESSMENT — ENCOUNTER SYMPTOMS
OCCASIONAL FEELINGS OF UNSTEADINESS: 0
LOSS OF SENSATION IN FEET: 0
DEPRESSION: 0

## 2024-07-25 ASSESSMENT — PAIN SCALES - GENERAL: PAINLEVEL: 0-NO PAIN

## 2024-07-30 ENCOUNTER — HOSPITAL ENCOUNTER (OUTPATIENT)
Dept: RADIOLOGY | Facility: CLINIC | Age: 64
Discharge: HOME | End: 2024-07-30
Payer: COMMERCIAL

## 2024-07-30 VITALS — WEIGHT: 249.12 LBS | BODY MASS INDEX: 41.51 KG/M2 | HEIGHT: 65 IN

## 2024-07-30 DIAGNOSIS — Z12.31 SCREENING MAMMOGRAM FOR BREAST CANCER: ICD-10-CM

## 2024-07-30 PROCEDURE — 77063 BREAST TOMOSYNTHESIS BI: CPT | Performed by: STUDENT IN AN ORGANIZED HEALTH CARE EDUCATION/TRAINING PROGRAM

## 2024-07-30 PROCEDURE — 77067 SCR MAMMO BI INCL CAD: CPT

## 2024-07-30 PROCEDURE — 77067 SCR MAMMO BI INCL CAD: CPT | Performed by: STUDENT IN AN ORGANIZED HEALTH CARE EDUCATION/TRAINING PROGRAM

## 2024-08-28 ENCOUNTER — APPOINTMENT (OUTPATIENT)
Dept: INTEGRATIVE MEDICINE | Facility: CLINIC | Age: 64
End: 2024-08-28
Payer: COMMERCIAL

## 2024-08-28 DIAGNOSIS — C56.9 MALIGNANT NEOPLASM OF OVARY, UNSPECIFIED LATERALITY (MULTI): Primary | ICD-10-CM

## 2024-08-28 PROCEDURE — 99214 OFFICE O/P EST MOD 30 MIN: CPT | Performed by: HOSPITALIST

## 2024-08-28 ASSESSMENT — PAIN SCALES - GENERAL: PAINLEVEL_OUTOF10: 4

## 2024-08-28 NOTE — PROGRESS NOTES
Acupuncture Visit:     Subjective   Patient ID: Sharmila Huerta is a 64 y.o. female who presents for No chief complaint on file.  HPI         Pre-treatment Assessment  Pain Score: 4  Anxiety Level (0-10): 0  Stress Level (0-10): 0  Coping Level (0-10): 10  Depression Level (0-10): 0  Fatigue Level (0-10): 5  Nausea Level (0-10): 0  Wellbeing Level (0-10): 10    Review of Systems         Provider reviewed plan for the acupuncture session, precautions and contraindications. Patient/guardian/hospital staff has given consent to treat with full understanding of what to expect during the session. Before acupuncture began, provider explained to the patient to communicate at any time if the procedure was causing discomfort past their tolerance level. Patient agreed to advise acupuncturist. The acupuncturist counseled the patient on the risks of acupuncture treatment including pain, infection, bleeding, and no relief of pain. The patient was positioned comfortably. There was no evidence of infection at the site of needle insertions.    Objective   Physical Exam    Acupuncture Physical Exam  Tongue Color: Pale body, Dark purple  Tongue Shape: Scalloped edges, Puffy    Treatment Plan  Treatment Goals: Fatigue reduction, Pain management  Pattern Differentiation: st qi deficiency sugar imbalance  Treatment Principle: move qi and blood regulate sugar    Acupuncture Treatment  Patient Position: Supine on a table  Needle Guage: 40 guage /.16/ Red seirin, 36 guage /.20/ Blue seirin  Body Points: With retention  Body Points - Bilateral: st qi (rx3, lx2) sp 3.2, 9, lr 3 li 4  Other Techniques Utilized: TDP Lamp  TDP Lamp Descripton: denis  Needle Count In: 13  Needle Count Out: 13  Needle Retention Time (min): 30 minutes  Total Face to Face Time (min): 25 minutes         Post-treatment Assessment  0-10 (Numeric) Pain Score: 4  Anxiety Level (0-10): 0  Stress Level (0-10): 0  Coping Level (0-10): 10  Depression Level (0-10): 0  Fatigue  Level (0-10): 4  Nausea Level (0-10): 0  Wellbeing Level (0-10): 10    Assessment/Plan

## 2024-08-28 NOTE — PROGRESS NOTES
Acupuncture Visit:     Subjective   Patient ID: Sharmila Huerta is a 64 y.o. female who presents for No chief complaint on file.  Pt reported that her CIPN is active and joint aches.  CIPN in feet presents largely in toes described as intermittently painful with pressure, worsens with cold, when cold toes ache and throb.  CIPN in hands is less and more manageable.  Both sites are numb and tingling.  Joint pain is also achy that is present right shoulder with difficulty and increased pain with movement.  She also has right sided forearm pain which is on and off.             Pre-treatment Assessment  Pain Score: 4  Anxiety Level (0-10): 0  Stress Level (0-10): 0  Coping Level (0-10): 10  Depression Level (0-10): 0  Fatigue Level (0-10): 5  Nausea Level (0-10): 0  Wellbeing Level (0-10): 10    Review of Systems         Provider reviewed plan for the acupuncture session, precautions and contraindications. Patient/guardian/hospital staff has given consent to treat with full understanding of what to expect during the session. Before acupuncture began, provider explained to the patient to communicate at any time if the procedure was causing discomfort past their tolerance level. Patient agreed to advise acupuncturist. The acupuncturist counseled the patient on the risks of acupuncture treatment including pain, infection, bleeding, and no relief of pain. The patient was positioned comfortably. There was no evidence of infection at the site of needle insertions.    Objective   Physical Exam    Acupuncture Physical Exam  Tongue Color: Pale body, Dark purple  Tongue Shape: Scalloped edges, Puffy    Treatment Plan  Treatment Goals: Fatigue reduction, Pain management  Pattern Differentiation: st qi deficiency sugar imbalance  Treatment Principle: move qi and blood regulate sugar    Acupuncture Treatment  Patient Position: Supine on a table  Needle Guage: 40 guage /.16/ Red seirin, 36 guage /.20/ Blue seirin  Body Points: With  retention  Body Points - Bilateral: st qi (rx3, lx2) sp 3.2, 9, lr 3 li 4  Other Techniques Utilized: TDP Lamp  TDP Lamp Descripton: denis  Needle Count In: 13  Needle Count Out: 13  Needle Retention Time (min): 30 minutes  Total Face to Face Time (min): 25 minutes              Assessment/Plan

## 2024-08-28 NOTE — PROGRESS NOTES
Patient ID: Sharmila Huerta is a 63 y.o. female.  Referring Physician: No referring provider defined for this encounter.  Primary Care Provider: Riana Dietirch MD     CANCER HISTORY:   63 y.o. 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 Olaparib - current treatment     INTEGRATIVE HISTORY:  Symptoms:  Urinary leakage     Neuropathy - Hands/feet -  numbness/pain/tingling - long-standing              On lyrica/cymbalta, helps with hands     Diet: not a big eater, chicken, beans, greens     PA: was not active during chemotherapy, used to be sleepy - now more energy  Walking    Sleep: sleeping ok     Stress: doing ok, lost brother last year - caused stress     Natural Products:    B6, B12 - not sure if helping     ROS:  no ha, visual symptoms, hearing loss  no sob, chest pain, palp  ROS o/w non contributory, please see HPI        Objective  PHYSICAL EXAM:  NAD, awake/alert  HEENT, NCAT, OP clear, no oral lesions  CTA bilat  RRR no mgr  Abd soft/nt/nd+bs  No c/c/e/ttp  Motor/sensory intact, CN 2-12 intact      RESULTS:             Assessment/Plan  63 y.o. 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 Olaparib      CANCER SPECIFIC RECCS:  LIFESTYLE:  Diet - 5-9 fruits/veg/day (7 veg to 2 fruits)  Cruciferous Vegetables - Brussel Sprouts, Kale, Broccoli, Cauliflower  PHYSICAL ACTIVITY 30 MIN/DAY - try walking often  Limit sugar     Natural Products:  I would stop b12/b6 - not sure it is helping  Vitamin D3 2000 Int units/day - taking     Reading:  [employer]  Cancer Fighting Kitchen     Websites:  Cook for your Life  [email or web address]     SYMPTOM MANAGEMENT:  Neuropathy:  Integrative  Modalities to consider:  Acupuncture weekly in Integrative Oncology Symptom Management Clinic weekly x 6 weeks then reassess  Massage/Reflexology     Arthritis symptoms - acupuncture  Omega 3 supplement  Turmeric ([place]oa)     Follow up:  4 months  IO symptom management acupuncture/massage    SYMPTOM MANAGEMENT:  Integrative Oncology Symptom Management:    The [facility] Integrative Oncology Symptom Management clinic offers multi-disciplinary supervised care of cancer patients using Integrative Modalities billed to insurance using NCCN and SIO/ASCO guideline-driven practices.  ESAS is obtained prior to and after each treatment by the practitioner    Symptoms Managed:  Arthralgias - continued    Neuropathy - always the same    Natural Products utilized:      Integrative Treatment: Acupuncture  Session #: 1  Frequency: weekly    Referrals:   Recommendations:    Follow Up:  Symptom Management: weekly  Integrative Oncology:     I have personally seen the patient and supervised the treatment by the integrative practitioner during this visit.  Pt had symptoms discussed and I was present for the patient's 60 minutes of direct patient care.

## 2024-09-04 ENCOUNTER — TELEMEDICINE (OUTPATIENT)
Dept: PALLIATIVE MEDICINE | Facility: HOSPITAL | Age: 64
End: 2024-09-04
Payer: COMMERCIAL

## 2024-09-04 DIAGNOSIS — C56.9 MALIGNANT NEOPLASM OF OVARY, UNSPECIFIED LATERALITY (MULTI): ICD-10-CM

## 2024-09-04 DIAGNOSIS — M25.50 ARTHRALGIA, UNSPECIFIED JOINT: ICD-10-CM

## 2024-09-04 DIAGNOSIS — K21.9 GASTROESOPHAGEAL REFLUX DISEASE WITHOUT ESOPHAGITIS: ICD-10-CM

## 2024-09-04 DIAGNOSIS — R06.02 SOB (SHORTNESS OF BREATH): ICD-10-CM

## 2024-09-04 DIAGNOSIS — F32.A DEPRESSION, UNSPECIFIED DEPRESSION TYPE: ICD-10-CM

## 2024-09-04 DIAGNOSIS — Z51.5 PALLIATIVE CARE ENCOUNTER: Primary | ICD-10-CM

## 2024-09-04 DIAGNOSIS — G62.0 CHEMOTHERAPY-INDUCED PERIPHERAL NEUROPATHY (MULTI): ICD-10-CM

## 2024-09-04 DIAGNOSIS — R11.0 NAUSEA: ICD-10-CM

## 2024-09-04 DIAGNOSIS — M25.50 GENERALIZED JOINT PAIN: ICD-10-CM

## 2024-09-04 DIAGNOSIS — R53.83 FATIGUE, UNSPECIFIED TYPE: ICD-10-CM

## 2024-09-04 DIAGNOSIS — T45.1X5A CHEMOTHERAPY-INDUCED PERIPHERAL NEUROPATHY (MULTI): ICD-10-CM

## 2024-09-04 PROCEDURE — 99214 OFFICE O/P EST MOD 30 MIN: CPT | Performed by: NURSE PRACTITIONER

## 2024-09-04 PROCEDURE — 1123F ACP DISCUSS/DSCN MKR DOCD: CPT | Performed by: NURSE PRACTITIONER

## 2024-09-04 RX ORDER — PROCHLORPERAZINE MALEATE 10 MG
10 TABLET ORAL EVERY 6 HOURS PRN
Qty: 120 TABLET | Refills: 2 | Status: SHIPPED | OUTPATIENT
Start: 2024-09-04 | End: 2024-12-03

## 2024-09-04 RX ORDER — ONDANSETRON HYDROCHLORIDE 8 MG/1
8 TABLET, FILM COATED ORAL EVERY 8 HOURS PRN
Qty: 90 TABLET | Refills: 2 | Status: SHIPPED | OUTPATIENT
Start: 2024-09-04 | End: 2024-12-03

## 2024-09-04 RX ORDER — ALBUTEROL SULFATE 90 UG/1
2 INHALANT RESPIRATORY (INHALATION) EVERY 8 HOURS PRN
Qty: 8 G | Refills: 5 | Status: SHIPPED | OUTPATIENT
Start: 2024-09-04 | End: 2025-03-03

## 2024-09-04 RX ORDER — ACETAMINOPHEN 500 MG
1000 TABLET ORAL EVERY 8 HOURS PRN
Qty: 180 TABLET | Refills: 5 | Status: SHIPPED | OUTPATIENT
Start: 2024-09-04 | End: 2025-03-03

## 2024-09-04 RX ORDER — ALBUTEROL SULFATE 90 UG/1
2 INHALANT RESPIRATORY (INHALATION) EVERY 8 HOURS PRN
Qty: 1 EACH | Refills: 5 | Status: SHIPPED | OUTPATIENT
Start: 2024-09-04 | End: 2024-09-04 | Stop reason: WASHOUT

## 2024-09-04 RX ORDER — FAMOTIDINE 20 MG/1
20 TABLET, FILM COATED ORAL NIGHTLY
Qty: 30 TABLET | Refills: 5 | Status: SHIPPED | OUTPATIENT
Start: 2024-09-04 | End: 2025-03-03

## 2024-09-25 ENCOUNTER — APPOINTMENT (OUTPATIENT)
Dept: INTEGRATIVE MEDICINE | Facility: CLINIC | Age: 64
End: 2024-09-25
Payer: COMMERCIAL

## 2024-09-25 DIAGNOSIS — C56.9 MALIGNANT NEOPLASM OF OVARY, UNSPECIFIED LATERALITY (MULTI): Primary | ICD-10-CM

## 2024-09-25 PROCEDURE — 99213 OFFICE O/P EST LOW 20 MIN: CPT | Performed by: HOSPITALIST

## 2024-09-25 NOTE — PROGRESS NOTES
Patient ID: Sharmila Huerta is a 63 y.o. female.  Referring Physician: No referring provider defined for this encounter.  Primary Care Provider: Riana Dietrich MD     CANCER HISTORY:   63 y.o. 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 Olaparib - current treatment     INTEGRATIVE HISTORY:  Symptoms:  Urinary leakage     Neuropathy - Hands/feet -  numbness/pain/tingling - long-standing              On lyrica/cymbalta, helps with hands     Diet: not a big eater, chicken, beans, greens     PA: was not active during chemotherapy, used to be sleepy - now more energy  Walking    Sleep: sleeping ok     Stress: doing ok, lost brother last year - caused stress     Natural Products:    B6, B12 - not sure if helping     ROS:  no ha, visual symptoms, hearing loss  no sob, chest pain, palp  ROS o/w non contributory, please see HPI        Objective  PHYSICAL EXAM:  NAD, awake/alert  HEENT, NCAT, OP clear, no oral lesions  CTA bilat  RRR no mgr  Abd soft/nt/nd+bs  No c/c/e/ttp  Motor/sensory intact, CN 2-12 intact      RESULTS:                  Assessment/Plan  63 y.o. 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 Olaparib      CANCER SPECIFIC RECCS:  LIFESTYLE:  Diet - 5-9 fruits/veg/day (7 veg to 2 fruits)  Cruciferous Vegetables - Brussel Sprouts, Kale, Broccoli, Cauliflower  PHYSICAL ACTIVITY 30 MIN/DAY - try walking often  Limit sugar     Natural Products:  I would stop b12/b6 - not sure it is helping  Vitamin D3 2000 Int units/day - taking     Reading:  [employer] Living  Cancer Fighting Kitchen     Websites:  Cook for your Life  [email or web address]     SYMPTOM  MANAGEMENT:  Neuropathy:  Integrative Modalities to consider:  Acupuncture weekly in Integrative Oncology Symptom Management Clinic weekly x 6 weeks then reassess  Massage/Reflexology     Arthritis symptoms - acupuncture  Omega 3 supplement  Turmeric (Hoa)     Follow up:  4 months  IO symptom management acupuncture/massage     SYMPTOM MANAGEMENT:  Integrative Oncology Symptom Management:     The [facility] Integrative Oncology Symptom Management clinic offers multi-disciplinary supervised care of cancer patients using Integrative Modalities billed to insurance using NCCN and SIO/ASCO guideline-driven practices.  ESAS is obtained prior to and after each treatment by the practitioner     Symptoms Managed:  Arthralgias - continued     Neuropathy - always the same     Natural Products utilized:        Integrative Treatment: Acupuncture  Session #: 2  Frequency: weekly     Referrals:   Recommendations:     Follow Up:  Symptom Management: weekly  Integrative Oncology:      I have personally seen the patient and supervised the treatment by the integrative practitioner during this visit.  Pt had symptoms discussed and I was present for the patient's 60 minutes of direct patient care.

## 2024-09-25 NOTE — PROGRESS NOTES
Acupuncture Visit:     Subjective   Patient ID: Sharmila Huerta is a 64 y.o. female who presents for No chief complaint on file.  Not much change after last tx  More neuropathy yesterday,  Hands are generally OK  Shoulder pain, occ hips  Can have knee pain.   Feet- 9/10 today  Shoulder 8/10  Feels she can walk up stairs easier than in past    Previous  Pt reported that her CIPN is active and joint aches.  CIPN in feet presents largely in toes described as intermittently painful with pressure, worsens with cold, when cold toes ache and throb.  CIPN in hands is less and more manageable.  Both sites are numb and tingling.  Joint pain is also achy that is present right shoulder with difficulty and increased pain with movement.  She also has right sided forearm pain which is on and off.                    Review of Systems         Provider reviewed plan for the acupuncture session, precautions and contraindications. Patient/guardian/hospital staff has given consent to treat with full understanding of what to expect during the session. Before acupuncture began, provider explained to the patient to communicate at any time if the procedure was causing discomfort past their tolerance level. Patient agreed to advise acupuncturist. The acupuncturist counseled the patient on the risks of acupuncture treatment including pain, infection, bleeding, and no relief of pain. The patient was positioned comfortably. There was no evidence of infection at the site of needle insertions.    Objective   Physical Exam       Acupuncture Treatment  Patient Position: Supine on a table  Needle Guage: 40 guage /.16/ Red seirin, 36 guage /.20/ Blue seirin  Body Points: With retention  Body Points - Bilateral: st qi (rx3, lx2) sp 3.2, 9, lr 3 li 4  Other Techniques Utilized: TDP Lamp  TDP Lamp Descripton: denis  Needle Count In: 13  Needle Count Out: 13  Needle Retention Time (min): 30 minutes  Total Face to Face Time (min): 25 minutes        Acupuncture Treatment  Needle Guage: 40 guage /.16/ Red seirin  Body Points - Bilateral: LI15, Li4, Si3, SP6, KI6, Ba yandel  Body Points - Right: SJ14  Other Techniques Utilized: TDP Lamp  TDP Lamp Descripton: feet  Needle Count In: 17  Needle Count Out: 17  Needle Retention Time (min): 25 minutes  Total Face to Face Time (min): 25 minutes              Assessment/Plan

## 2024-10-02 ENCOUNTER — APPOINTMENT (OUTPATIENT)
Dept: INTEGRATIVE MEDICINE | Facility: CLINIC | Age: 64
End: 2024-10-02
Payer: COMMERCIAL

## 2024-10-02 DIAGNOSIS — Z51.5 PALLIATIVE CARE ENCOUNTER: ICD-10-CM

## 2024-10-02 DIAGNOSIS — C56.9 MALIGNANT NEOPLASM OF OVARY, UNSPECIFIED LATERALITY (MULTI): Primary | ICD-10-CM

## 2024-10-02 PROCEDURE — 99213 OFFICE O/P EST LOW 20 MIN: CPT | Performed by: HOSPITALIST

## 2024-10-02 NOTE — PROGRESS NOTES
Acupuncture Visit:     Subjective   Patient ID: Sharmila Huerta is a 64 y.o. female who presents for No chief complaint on file.  Less pain the whole week  5/10 today  Shoulder, 3/10        Not much change after last tx  More neuropathy yesterday,  Hands are generally OK  Shoulder pain, occ hips  Can have knee pain.   Feet- 9/10 today  Shoulder 8/10  Feels she can walk up stairs easier than in past    Previous  Pt reported that her CIPN is active and joint aches.  CIPN in feet presents largely in toes described as intermittently painful with pressure, worsens with cold, when cold toes ache and throb.  CIPN in hands is less and more manageable.  Both sites are numb and tingling.  Joint pain is also achy that is present right shoulder with difficulty and increased pain with movement.  She also has right sided forearm pain which is on and off.                  Review of Systems         Provider reviewed plan for the acupuncture session, precautions and contraindications. Patient/guardian/hospital staff has given consent to treat with full understanding of what to expect during the session. Before acupuncture began, provider explained to the patient to communicate at any time if the procedure was causing discomfort past their tolerance level. Patient agreed to advise acupuncturist. The acupuncturist counseled the patient on the risks of acupuncture treatment including pain, infection, bleeding, and no relief of pain. The patient was positioned comfortably. There was no evidence of infection at the site of needle insertions.    Objective   Physical Exam       Acupuncture Treatment  Needle Guage: 40 guage /.16/ Red seirin  Body Points - Bilateral: LI15, Li4, Si3, SP6, KI6, Ba yandel  Body Points - Right: SJ14  Other Techniques Utilized: TDP Lamp  TDP Lamp Descripton: feet  Needle Count In: 17  Needle Count Out: 17  Needle Retention Time (min): 25 minutes  Total Face to Face Time (min): 25 minutes                       Assessment/Plan

## 2024-10-03 ENCOUNTER — APPOINTMENT (OUTPATIENT)
Dept: PRIMARY CARE | Facility: CLINIC | Age: 64
End: 2024-10-03
Payer: COMMERCIAL

## 2024-10-03 VITALS — SYSTOLIC BLOOD PRESSURE: 113 MMHG | BODY MASS INDEX: 40.28 KG/M2 | WEIGHT: 245 LBS | DIASTOLIC BLOOD PRESSURE: 72 MMHG

## 2024-10-03 DIAGNOSIS — Z23 FLU VACCINE NEED: ICD-10-CM

## 2024-10-03 DIAGNOSIS — J44.9 CHRONIC OBSTRUCTIVE PULMONARY DISEASE, UNSPECIFIED COPD TYPE (MULTI): ICD-10-CM

## 2024-10-03 DIAGNOSIS — G47.33 OSA (OBSTRUCTIVE SLEEP APNEA): ICD-10-CM

## 2024-10-03 DIAGNOSIS — E11.9 DIABETES MELLITUS WITHOUT COMPLICATION (MULTI): ICD-10-CM

## 2024-10-03 DIAGNOSIS — C80.0 CARCINOMATOSIS (MULTI): Primary | ICD-10-CM

## 2024-10-03 DIAGNOSIS — F17.210 CIGARETTE NICOTINE DEPENDENCE WITHOUT COMPLICATION: ICD-10-CM

## 2024-10-03 PROCEDURE — 99214 OFFICE O/P EST MOD 30 MIN: CPT | Performed by: INTERNAL MEDICINE

## 2024-10-03 PROCEDURE — G0008 ADMIN INFLUENZA VIRUS VAC: HCPCS | Performed by: INTERNAL MEDICINE

## 2024-10-03 PROCEDURE — 3074F SYST BP LT 130 MM HG: CPT | Performed by: INTERNAL MEDICINE

## 2024-10-03 PROCEDURE — 90662 IIV NO PRSV INCREASED AG IM: CPT | Performed by: INTERNAL MEDICINE

## 2024-10-03 PROCEDURE — 1124F ACP DISCUSS-NO DSCNMKR DOCD: CPT | Performed by: INTERNAL MEDICINE

## 2024-10-03 PROCEDURE — 3044F HG A1C LEVEL LT 7.0%: CPT | Performed by: INTERNAL MEDICINE

## 2024-10-03 PROCEDURE — 3049F LDL-C 100-129 MG/DL: CPT | Performed by: INTERNAL MEDICINE

## 2024-10-03 PROCEDURE — 3078F DIAST BP <80 MM HG: CPT | Performed by: INTERNAL MEDICINE

## 2024-10-03 RX ORDER — METFORMIN HYDROCHLORIDE 500 MG/1
500 TABLET ORAL
Qty: 100 TABLET | Refills: 1 | Status: SHIPPED | OUTPATIENT
Start: 2024-10-03 | End: 2025-11-07

## 2024-10-03 ASSESSMENT — ENCOUNTER SYMPTOMS
DEPRESSION: 0
LOSS OF SENSATION IN FEET: 0
OCCASIONAL FEELINGS OF UNSTEADINESS: 0

## 2024-10-03 ASSESSMENT — COLUMBIA-SUICIDE SEVERITY RATING SCALE - C-SSRS
1. IN THE PAST MONTH, HAVE YOU WISHED YOU WERE DEAD OR WISHED YOU COULD GO TO SLEEP AND NOT WAKE UP?: NO
2. HAVE YOU ACTUALLY HAD ANY THOUGHTS OF KILLING YOURSELF?: NO
6. HAVE YOU EVER DONE ANYTHING, STARTED TO DO ANYTHING, OR PREPARED TO DO ANYTHING TO END YOUR LIFE?: NO

## 2024-10-03 NOTE — PROGRESS NOTES
Subjective   Patient ID: Sharmila Huerta is a 64 y.o. female who presents for FUV.    HPI  Patient in for a visit  Glucose higher hba1c   She does not have sugar in the house , does not drink a lot of juice uses sweet creamer  in her coffee 1 a day   She has not been on medications for diabetes , no injections     Review of Systems  General: Denies fever, chills, night sweats,  Eyes: Negative for recent visual changes  Ears, Nose, Throat :  Negative for hearing changes, sinus discomfort  Dermatologic: Negative for new skin conditions, rash  Respiratory: Negative for wheezing, shortness of breath, cough  Cardiovascular: Negative for chest pain, palpitations, or leg swelling  Gastrointestinal: Negative for nausea/vomiting, abdominal pain, changes in bowel habits  Genitourinary Negative for Urinary Incontinence  urgency , frequency, discomfort   Musculoskeletal: see hpi  Neurological: Negative for headaches, dizziness    Previous history  Past Medical History:   Diagnosis Date    Malignant neoplasm of unspecified ovary (Multi) 06/27/2022    Malignant neoplasm of ovary, unspecified laterality     Past Surgical History:   Procedure Laterality Date    OTHER SURGICAL HISTORY  06/20/2022    Hysterectomy abdominal    US GUIDED ABDOMINAL PARACENTESIS  11/8/2021    US GUIDED ABDOMINAL PARACENTESIS 11/8/2021 [facility]    US GUIDED ABDOMINAL PARACENTESIS  11/30/2021    US GUIDED ABDOMINAL PARACENTESIS 11/30/2021 [facility] ANCILLARY LEGACY     Social History     Tobacco Use    Smoking status: Every Day     Types: Cigarettes    Smokeless tobacco: Never   Vaping Use    Vaping status: Never Used   Substance Use Topics    Alcohol use: Not Currently    Drug use: Not Currently     Family History   Problem Relation Name Age of Onset    Ovarian cancer Sister      Lung cancer Brother      No Known Problems Daughter Balta     No Known Problems Daughter Domonique     No Known Problems Daughter Daily     No Known Problems Other  Child      Allergies   Allergen Reactions    Penicillins Unknown    Capital With Codeine [Acetaminophen-Codeine] Palpitations     Current Outpatient Medications   Medication Instructions    acetaminophen (TYLENOL) 1,000 mg, oral, Every 8 hours PRN    albuterol (Ventolin HFA) 90 mcg/actuation inhaler 2 puffs, inhalation, Every 8 hours PRN    DULoxetine (CYMBALTA) 30 mg, oral, Nightly, In addition to 60mg capsule for total of 90mg total    DULoxetine (CYMBALTA) 60 mg, oral, Nightly, In addition to 30mg capsule for total of 90mg    famotidine (PEPCID) 20 mg, oral, Nightly    fluticasone (Flonase) 50 mcg/actuation nasal spray 2 sprays, Each Nostril, Daily    ibuprofen (IBU) 600 mg, oral, Every 6 hours PRN,  for pain    magnesium oxide (Mag-Ox) 400 mg (241.3 mg magnesium) tablet 2 tablets, oral, 2 times daily    ondansetron (ZOFRAN) 8 mg, oral, Every 8 hours PRN    polyethylene glycol (GAVILAX) 17 g, oral, Daily PRN    pregabalin (LYRICA) 200 mg, oral, 3 times daily    prochlorperazine (COMPAZINE) 10 mg, oral, Every 6 hours PRN    tiotropium (Spiriva Respimat) 2.5 mcg/actuation inhaler 2 puffs, inhalation, Daily, Go to ER if chest pain or palpitations or feeling worse or visual/eye problems or urinary retention<BR>    topiramate (TOPAMAX) 100 mg, oral, 2 times daily    zinc sulfate (Zincate) 220 (50 Zn) MG capsule 50 mg of elemental zinc, oral, Daily,  for change in taste       Objective       Physical Exam  Vital Signs: as recorded above  General: Well groomed, well nourished   Orientation:  Alert , oriented to time, place , and person   Mood and Affect:  Cooperative , no apparent distress normal affect  Skin: Good color, good turgor  Eyes: Extra ocular muscle movements intact, anicteric sclerae  Neck: Supple, full range of movement  Chest: Normal breath sounds, normal chest wall exam, symmetric, good air entry, clear to auscultation  Heart: Regular rate and rhythm, without murmur, gallop, or rubs  BACK:  no CTLS spine  tenderness, no flank tenderness  Extremities: full range of movement  bilateral UE and bilateral LE,  no lower extremity edema  Neurological: Alert, oriented, cranial nerves II-XII grossly intact except for visual acuity  Sensation:  Intact   Gait: normal steady      Assessment/Plan   Sharmila Huerta is a 64 y.o. 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  Do not buy favorite cigarettes .   Explained risks of continuing.  Recommended medication and suggested over the counter patches. Gum, lozenges that may help patient quit.    [place] needs doctor Silvestre who left [place] will put in consult to sleep medicine      Stress she has good family support offered consult to psychology , she will let me know     DIABETES MELLITUS PLAN:starting metformin  medication regimen  Follow Diabetic diet, will consult nutrition if needed,  exercise as discussed, weight control., before our next visit will obtain HbA1c, BMP, urine microalbumin, ophthalmology exam.  Need Podiatry checks periodically.    FLU SHOT VO4.8  No allergies, no previous reaction or severe reaction within 6 weeks of receiving flu shot in the past , no fever, myalgia.  Receiving Flu shot today      Discussed with:   Return in :  1 month hba1c due before   Portions of this note were generated using digital voice recognition software, and may contain grammatical errors       Riana Dietrich MD  10/03/24  12:44 PM

## 2024-10-03 NOTE — PATIENT INSTRUCTIONS
Please follow a stricter low carbohydrate diet, cut back on bread and potatoes   We will start metformin 500 mg once a day with breakfast   Please get labwork done in a month a few days before our visit in a month  Try to lose 2-3 lbs over a month     Please call our  [phone] to assist with scheduling for the sleep medicine doctor and the tobacco cessation clinic    Let me know if you need someone to talk to you , counselling with your sadness

## 2024-10-07 ENCOUNTER — APPOINTMENT (OUTPATIENT)
Dept: INTEGRATIVE MEDICINE | Facility: CLINIC | Age: 64
End: 2024-10-07
Payer: COMMERCIAL

## 2024-10-14 ENCOUNTER — APPOINTMENT (OUTPATIENT)
Dept: INTEGRATIVE MEDICINE | Facility: CLINIC | Age: 64
End: 2024-10-14
Payer: COMMERCIAL

## 2024-10-14 DIAGNOSIS — Z51.5 PALLIATIVE CARE ENCOUNTER: ICD-10-CM

## 2024-10-14 DIAGNOSIS — C56.9 MALIGNANT NEOPLASM OF OVARY, UNSPECIFIED LATERALITY (MULTI): Primary | ICD-10-CM

## 2024-10-14 PROCEDURE — 99214 OFFICE O/P EST MOD 30 MIN: CPT | Performed by: HOSPITALIST

## 2024-10-14 NOTE — PROGRESS NOTES
Patient ID: Sharmila Huerta is a 63 y.o. female.  Referring Physician: No referring provider defined for this encounter.  Primary Care Provider: Riana Dietrich MD     CANCER HISTORY:   64y.o. 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 Olaparib - off since 4/24     INTEGRATIVE HISTORY:  Symptoms:  Urinary leakage     Neuropathy - Hands/feet -  numbness/pain/tingling - long-standing              On lyrica/cymbalta, helps with hands   Getting a little better - pain     Diet: not a big eater, chicken, beans, greens     PA: was not active during chemotherapy, used to be sleepy - now more energy  Walking    Sleep: sleeping ok     Stress: doing ok, lost brother last year - caused stress but coming to acceptance     Natural Products:    B6, B12 - not sure if helping     ROS:  no ha, visual symptoms, hearing loss  no sob, chest pain, palp  ROS o/w non contributory, please see HPI        Objective  PHYSICAL EXAM:  NAD, awake/alert  HEENT, NCAT, OP clear, no oral lesions  CTA bilat  RRR no mgr  Abd soft/nt/nd+bs  No c/c/e/ttp  Motor/sensory intact, CN 2-12 intact     Assessment/Plan  63 y.o. 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 Olaparib      CANCER SPECIFIC RECCS:  LIFESTYLE:  Diet - 5-9 fruits/veg/day (7 veg to 2 fruits)  Cruciferous Vegetables - Brussel Sprouts, Kale, Broccoli, Cauliflower  PHYSICAL ACTIVITY 30 MIN/DAY - try walking often  Limit sugar     Natural Products:  I would stop b12/b6 - not sure it is helping  Vitamin D3 2000 Int units/day - taking     Reading:  An[employer]ancer Living  Cancer Fighting Kitchen     Websites:  Cook for your Life  [email or web address]     SYMPTOM  MANAGEMENT:  Neuropathy:  Integrative Modalities to consider:  Acupuncture weekly in Integrative Oncology Symptom Management Clinic weekly x 6 weeks then reassess - helping a bit  Massage/Reflexology  Scrambler Therapy     Arthritis symptoms - acupuncture  Omega 3 supplement  Turmeric (Hoa)    Stress - brother passed 1 yr ago, dealing with it well     Follow up:  6 months  IO symptom management acupuncture/massage  Scrambler Therapy

## 2024-10-18 ENCOUNTER — TELEPHONE (OUTPATIENT)
Dept: RADIOLOGY | Facility: HOSPITAL | Age: 64
End: 2024-10-18
Payer: COMMERCIAL

## 2024-10-18 NOTE — TELEPHONE ENCOUNTER
Attempted to contact patient by phone to schedule pulmonary referral. Call went unanswered and I was unable to leave a voice message. I did leave a SMS notification. Epic message to schedule was sent.

## 2024-10-21 ENCOUNTER — APPOINTMENT (OUTPATIENT)
Dept: PAIN MEDICINE | Facility: CLINIC | Age: 64
End: 2024-10-21
Payer: COMMERCIAL

## 2024-10-28 ENCOUNTER — OFFICE VISIT (OUTPATIENT)
Dept: PAIN MEDICINE | Facility: CLINIC | Age: 64
End: 2024-10-28
Payer: COMMERCIAL

## 2024-10-28 VITALS
HEART RATE: 94 BPM | RESPIRATION RATE: 16 BRPM | BODY MASS INDEX: 40.82 KG/M2 | SYSTOLIC BLOOD PRESSURE: 113 MMHG | WEIGHT: 245 LBS | DIASTOLIC BLOOD PRESSURE: 73 MMHG | HEIGHT: 65 IN | OXYGEN SATURATION: 99 %

## 2024-10-28 DIAGNOSIS — G62.0 CHEMOTHERAPY-INDUCED NEUROPATHY (MULTI): ICD-10-CM

## 2024-10-28 DIAGNOSIS — T45.1X5A CHEMOTHERAPY-INDUCED NEUROPATHY (MULTI): ICD-10-CM

## 2024-10-28 DIAGNOSIS — M79.671 BILATERAL FOOT PAIN: ICD-10-CM

## 2024-10-28 DIAGNOSIS — M79.641 BILATERAL HAND PAIN: ICD-10-CM

## 2024-10-28 DIAGNOSIS — F17.200 TOBACCO DEPENDENCY: Primary | ICD-10-CM

## 2024-10-28 DIAGNOSIS — M79.642 BILATERAL HAND PAIN: ICD-10-CM

## 2024-10-28 DIAGNOSIS — M79.672 BILATERAL FOOT PAIN: ICD-10-CM

## 2024-10-28 PROCEDURE — 99204 OFFICE O/P NEW MOD 45 MIN: CPT | Performed by: ANESTHESIOLOGY

## 2024-10-28 PROCEDURE — 3044F HG A1C LEVEL LT 7.0%: CPT | Performed by: ANESTHESIOLOGY

## 2024-10-28 PROCEDURE — 3078F DIAST BP <80 MM HG: CPT | Performed by: ANESTHESIOLOGY

## 2024-10-28 PROCEDURE — 1159F MED LIST DOCD IN RCRD: CPT | Performed by: ANESTHESIOLOGY

## 2024-10-28 PROCEDURE — 99406 BEHAV CHNG SMOKING 3-10 MIN: CPT | Performed by: ANESTHESIOLOGY

## 2024-10-28 PROCEDURE — 3049F LDL-C 100-129 MG/DL: CPT | Performed by: ANESTHESIOLOGY

## 2024-10-28 PROCEDURE — 1160F RVW MEDS BY RX/DR IN RCRD: CPT | Performed by: ANESTHESIOLOGY

## 2024-10-28 PROCEDURE — 3074F SYST BP LT 130 MM HG: CPT | Performed by: ANESTHESIOLOGY

## 2024-10-28 PROCEDURE — 99214 OFFICE O/P EST MOD 30 MIN: CPT | Performed by: ANESTHESIOLOGY

## 2024-10-28 PROCEDURE — 3008F BODY MASS INDEX DOCD: CPT | Performed by: ANESTHESIOLOGY

## 2024-10-28 PROCEDURE — 1125F AMNT PAIN NOTED PAIN PRSNT: CPT | Performed by: ANESTHESIOLOGY

## 2024-10-28 PROCEDURE — 1123F ACP DISCUSS/DSCN MKR DOCD: CPT | Performed by: ANESTHESIOLOGY

## 2024-10-28 ASSESSMENT — ENCOUNTER SYMPTOMS
PSYCHIATRIC NEGATIVE: 1
HEMATOLOGIC/LYMPHATIC NEGATIVE: 1
NUMBNESS: 1
DEPRESSION: 0
ENDOCRINE NEGATIVE: 1
LOSS OF SENSATION IN FEET: 1
CONSTITUTIONAL NEGATIVE: 1
CARDIOVASCULAR NEGATIVE: 1
OCCASIONAL FEELINGS OF UNSTEADINESS: 0
MUSCULOSKELETAL NEGATIVE: 1
GASTROINTESTINAL NEGATIVE: 1
EYES NEGATIVE: 1
RESPIRATORY NEGATIVE: 1

## 2024-10-28 ASSESSMENT — PAIN SCALES - GENERAL
PAINLEVEL_OUTOF10: 9
PAINLEVEL_OUTOF10: 9

## 2024-10-28 ASSESSMENT — LIFESTYLE VARIABLES: TOTAL SCORE: 3

## 2024-10-28 ASSESSMENT — PAIN - FUNCTIONAL ASSESSMENT: PAIN_FUNCTIONAL_ASSESSMENT: 0-10

## 2024-10-28 ASSESSMENT — PATIENT HEALTH QUESTIONNAIRE - PHQ9
SUM OF ALL RESPONSES TO PHQ9 QUESTIONS 1 AND 2: 0
1. LITTLE INTEREST OR PLEASURE IN DOING THINGS: NOT AT ALL
2. FEELING DOWN, DEPRESSED OR HOPELESS: NOT AT ALL

## 2024-10-28 ASSESSMENT — PAIN DESCRIPTION - DESCRIPTORS: DESCRIPTORS: NUMBNESS;PINS AND NEEDLES

## 2024-10-30 ENCOUNTER — LAB (OUTPATIENT)
Dept: LAB | Facility: LAB | Age: 64
End: 2024-10-30
Payer: COMMERCIAL

## 2024-10-30 ENCOUNTER — APPOINTMENT (OUTPATIENT)
Dept: INTEGRATIVE MEDICINE | Facility: CLINIC | Age: 64
End: 2024-10-30
Payer: COMMERCIAL

## 2024-10-30 DIAGNOSIS — C56.9 MALIGNANT NEOPLASM OF OVARY, UNSPECIFIED LATERALITY (MULTI): ICD-10-CM

## 2024-10-30 DIAGNOSIS — T45.1X5A CHEMOTHERAPY-INDUCED NEUROPATHY (MULTI): Primary | ICD-10-CM

## 2024-10-30 DIAGNOSIS — E11.9 DIABETES MELLITUS WITHOUT COMPLICATION (MULTI): ICD-10-CM

## 2024-10-30 DIAGNOSIS — G62.0 CHEMOTHERAPY-INDUCED NEUROPATHY (MULTI): Primary | ICD-10-CM

## 2024-10-30 PROBLEM — M79.641 BILATERAL HAND PAIN: Status: ACTIVE | Noted: 2024-10-30

## 2024-10-30 PROBLEM — M79.642 BILATERAL HAND PAIN: Status: ACTIVE | Noted: 2024-10-30

## 2024-10-30 PROBLEM — M79.671 BILATERAL FOOT PAIN: Status: ACTIVE | Noted: 2024-10-30

## 2024-10-30 PROBLEM — M79.672 BILATERAL FOOT PAIN: Status: ACTIVE | Noted: 2024-10-30

## 2024-10-30 LAB
ALBUMIN SERPL BCP-MCNC: 4 G/DL (ref 3.4–5)
ALP SERPL-CCNC: 70 U/L (ref 33–136)
ALT SERPL W P-5'-P-CCNC: 7 U/L (ref 7–45)
ANION GAP SERPL CALC-SCNC: 12 MMOL/L (ref 10–20)
AST SERPL W P-5'-P-CCNC: 11 U/L (ref 9–39)
BILIRUB SERPL-MCNC: 0.4 MG/DL (ref 0–1.2)
BUN SERPL-MCNC: 13 MG/DL (ref 6–23)
CALCIUM SERPL-MCNC: 9.4 MG/DL (ref 8.6–10.6)
CANCER AG125 SERPL-ACNC: 8.7 U/ML (ref 0–30.2)
CHLORIDE SERPL-SCNC: 107 MMOL/L (ref 98–107)
CO2 SERPL-SCNC: 26 MMOL/L (ref 21–32)
CREAT SERPL-MCNC: 0.93 MG/DL (ref 0.5–1.05)
EGFRCR SERPLBLD CKD-EPI 2021: 68 ML/MIN/1.73M*2
EST. AVERAGE GLUCOSE BLD GHB EST-MCNC: 140 MG/DL
GLUCOSE SERPL-MCNC: 146 MG/DL (ref 74–99)
HBA1C MFR BLD: 6.5 %
POTASSIUM SERPL-SCNC: 4.4 MMOL/L (ref 3.5–5.3)
PROT SERPL-MCNC: 7 G/DL (ref 6.4–8.2)
SODIUM SERPL-SCNC: 141 MMOL/L (ref 136–145)

## 2024-10-30 PROCEDURE — 83036 HEMOGLOBIN GLYCOSYLATED A1C: CPT

## 2024-10-30 PROCEDURE — 86304 IMMUNOASSAY TUMOR CA 125: CPT

## 2024-10-30 PROCEDURE — 36415 COLL VENOUS BLD VENIPUNCTURE: CPT

## 2024-10-30 PROCEDURE — 80053 COMPREHEN METABOLIC PANEL: CPT

## 2024-10-31 ENCOUNTER — OFFICE VISIT (OUTPATIENT)
Dept: GYNECOLOGIC ONCOLOGY | Facility: CLINIC | Age: 64
End: 2024-10-31
Payer: COMMERCIAL

## 2024-10-31 VITALS
RESPIRATION RATE: 18 BRPM | TEMPERATURE: 97.5 F | SYSTOLIC BLOOD PRESSURE: 121 MMHG | DIASTOLIC BLOOD PRESSURE: 74 MMHG | HEART RATE: 106 BPM | WEIGHT: 245.7 LBS | BODY MASS INDEX: 40.89 KG/M2

## 2024-10-31 DIAGNOSIS — G62.0 CHEMOTHERAPY-INDUCED NEUROPATHY (MULTI): ICD-10-CM

## 2024-10-31 DIAGNOSIS — T45.1X5A CHEMOTHERAPY-INDUCED NEUROPATHY (MULTI): ICD-10-CM

## 2024-10-31 DIAGNOSIS — Z85.43 ENCOUNTER FOR FOLLOW-UP SURVEILLANCE OF OVARIAN CANCER: ICD-10-CM

## 2024-10-31 DIAGNOSIS — C56.9 MALIGNANT NEOPLASM OF OVARY, UNSPECIFIED LATERALITY (MULTI): Primary | ICD-10-CM

## 2024-10-31 DIAGNOSIS — N39.3 STRESS INCONTINENCE, FEMALE: ICD-10-CM

## 2024-10-31 DIAGNOSIS — Z08 ENCOUNTER FOR FOLLOW-UP SURVEILLANCE OF OVARIAN CANCER: ICD-10-CM

## 2024-10-31 PROCEDURE — 1160F RVW MEDS BY RX/DR IN RCRD: CPT | Performed by: STUDENT IN AN ORGANIZED HEALTH CARE EDUCATION/TRAINING PROGRAM

## 2024-10-31 PROCEDURE — G2211 COMPLEX E/M VISIT ADD ON: HCPCS | Performed by: STUDENT IN AN ORGANIZED HEALTH CARE EDUCATION/TRAINING PROGRAM

## 2024-10-31 PROCEDURE — 1123F ACP DISCUSS/DSCN MKR DOCD: CPT | Performed by: STUDENT IN AN ORGANIZED HEALTH CARE EDUCATION/TRAINING PROGRAM

## 2024-10-31 PROCEDURE — 3049F LDL-C 100-129 MG/DL: CPT | Performed by: STUDENT IN AN ORGANIZED HEALTH CARE EDUCATION/TRAINING PROGRAM

## 2024-10-31 PROCEDURE — 1126F AMNT PAIN NOTED NONE PRSNT: CPT | Performed by: STUDENT IN AN ORGANIZED HEALTH CARE EDUCATION/TRAINING PROGRAM

## 2024-10-31 PROCEDURE — 99214 OFFICE O/P EST MOD 30 MIN: CPT | Performed by: STUDENT IN AN ORGANIZED HEALTH CARE EDUCATION/TRAINING PROGRAM

## 2024-10-31 PROCEDURE — 3044F HG A1C LEVEL LT 7.0%: CPT | Performed by: STUDENT IN AN ORGANIZED HEALTH CARE EDUCATION/TRAINING PROGRAM

## 2024-10-31 PROCEDURE — 3078F DIAST BP <80 MM HG: CPT | Performed by: STUDENT IN AN ORGANIZED HEALTH CARE EDUCATION/TRAINING PROGRAM

## 2024-10-31 PROCEDURE — 1159F MED LIST DOCD IN RCRD: CPT | Performed by: STUDENT IN AN ORGANIZED HEALTH CARE EDUCATION/TRAINING PROGRAM

## 2024-10-31 PROCEDURE — 3074F SYST BP LT 130 MM HG: CPT | Performed by: STUDENT IN AN ORGANIZED HEALTH CARE EDUCATION/TRAINING PROGRAM

## 2024-10-31 ASSESSMENT — ENCOUNTER SYMPTOMS
OCCASIONAL FEELINGS OF UNSTEADINESS: 0
DEPRESSION: 0
LOSS OF SENSATION IN FEET: 1

## 2024-10-31 ASSESSMENT — PAIN SCALES - GENERAL: PAINLEVEL_OUTOF10: 0-NO PAIN

## 2024-11-05 ENCOUNTER — APPOINTMENT (OUTPATIENT)
Dept: INTEGRATIVE MEDICINE | Facility: CLINIC | Age: 64
End: 2024-11-05
Payer: COMMERCIAL

## 2024-11-05 DIAGNOSIS — G62.0 CHEMOTHERAPY-INDUCED NEUROPATHY (MULTI): Primary | ICD-10-CM

## 2024-11-05 DIAGNOSIS — T45.1X5A CHEMOTHERAPY-INDUCED NEUROPATHY (MULTI): Primary | ICD-10-CM

## 2024-11-05 NOTE — PROGRESS NOTES
Acupuncture Visit:     Subjective   Patient ID: Sharmila Huerta is a 64 y.o. female who presents for No chief complaint on file.  More pain again this week  8/10  Still just in feet, no radiation up legs.   Hands are a little worse, nut not as bad.   Sleep good.      Had improved pain last week  More pain this week, has been more busy and has more stressors  5/10 today  Feet  are the worst. Occ hands.       previous  Less pain the whole week  5/10 today  Shoulder, 3/10        Not much change after last tx  More neuropathy yesterday,  Hands are generally OK  Shoulder pain, occ hips  Can have knee pain.   Feet- 9/10 today  Shoulder 8/10  Feels she can walk up stairs easier than in past    Previous  Pt reported that her CIPN is active and joint aches.  CIPN in feet presents largely in toes described as intermittently painful with pressure, worsens with cold, when cold toes ache and throb.  CIPN in hands is less and more manageable.  Both sites are numb and tingling.  Joint pain is also achy that is present right shoulder with difficulty and increased pain with movement.  She also has right sided forearm pain which is on and off.                    Review of Systems         Provider reviewed plan for the acupuncture session, precautions and contraindications. Patient/guardian/hospital staff has given consent to treat with full understanding of what to expect during the session. Before acupuncture began, provider explained to the patient to communicate at any time if the procedure was causing discomfort past their tolerance level. Patient agreed to advise acupuncturist. The acupuncturist counseled the patient on the risks of acupuncture treatment including pain, infection, bleeding, and no relief of pain. The patient was positioned comfortably. There was no evidence of infection at the site of needle insertions.    Objective   Physical Exam       Acupuncture Treatment  Needle Guage: 40 guage /.16/ Red seirin  Body  Points - Bilateral: LI15, Li4, Si3, SP6, KI6, Ba yandel  Body Points - Right: SJ14  Other Techniques Utilized: TDP Lamp  TDP Lamp Descripton: feet  Needle Count In: 17  Needle Count Out: 17  Needle Retention Time (min): 25 minutes  Total Face to Face Time (min): 25 minutes                      Assessment/Plan   Diagnoses and all orders for this visit:  Chemotherapy-induced neuropathy (Multi) (Primary)

## 2024-11-06 ENCOUNTER — APPOINTMENT (OUTPATIENT)
Dept: PRIMARY CARE | Facility: CLINIC | Age: 64
End: 2024-11-06
Payer: COMMERCIAL

## 2024-11-13 DIAGNOSIS — R79.9 ABNORMAL BLOOD CHEMISTRY: Primary | ICD-10-CM

## 2024-11-19 ENCOUNTER — APPOINTMENT (OUTPATIENT)
Dept: INTEGRATIVE MEDICINE | Facility: CLINIC | Age: 64
End: 2024-11-19
Payer: COMMERCIAL

## 2024-11-19 DIAGNOSIS — T45.1X5A CHEMOTHERAPY-INDUCED NEUROPATHY (MULTI): Primary | ICD-10-CM

## 2024-11-19 DIAGNOSIS — G62.0 CHEMOTHERAPY-INDUCED NEUROPATHY (MULTI): Primary | ICD-10-CM

## 2024-11-19 NOTE — PROGRESS NOTES
Acupuncture Visit:     Subjective   Patient ID: Sharmila Huerta is a 64 y.o. female who presents for No chief complaint on file.  Pain is less today  3-4/10 in feet and toes only  Hands 4/10 not too bad  Sleep, no issues.         More pain again this week  8/10  Still just in feet, no radiation up legs.   Hands are a little worse, nut not as bad.   Sleep good.      Had improved pain last week  More pain this week, has been more busy and has more stressors  5/10 today  Feet  are the worst. Occ hands.       previous  Less pain the whole week  5/10 today  Shoulder, 3/10        Not much change after last tx  More neuropathy yesterday,  Hands are generally OK  Shoulder pain, occ hips  Can have knee pain.   Feet- 9/10 today  Shoulder 8/10  Feels she can walk up stairs easier than in past    Previous  Pt reported that her CIPN is active and joint aches.  CIPN in feet presents largely in toes described as intermittently painful with pressure, worsens with cold, when cold toes ache and throb.  CIPN in hands is less and more manageable.  Both sites are numb and tingling.  Joint pain is also achy that is present right shoulder with difficulty and increased pain with movement.  She also has right sided forearm pain which is on and off.                    Review of Systems         Provider reviewed plan for the acupuncture session, precautions and contraindications. Patient/guardian/hospital staff has given consent to treat with full understanding of what to expect during the session. Before acupuncture began, provider explained to the patient to communicate at any time if the procedure was causing discomfort past their tolerance level. Patient agreed to advise acupuncturist. The acupuncturist counseled the patient on the risks of acupuncture treatment including pain, infection, bleeding, and no relief of pain. The patient was positioned comfortably. There was no evidence of infection at the site of needle  insertions.    Objective   Physical Exam       Acupuncture Treatment  Needle Guage: 40 guage /.16/ Red seirin  Body Points - Bilateral: LI15, Li4, Si3, SP6, KI6, Ba yandel  Body Points - Right: SJ14  Other Techniques Utilized: TDP Lamp  TDP Lamp Descripton: feet  Needle Count In: 17  Needle Count Out: 17  Needle Retention Time (min): 25 minutes  Total Face to Face Time (min): 25 minutes                      Assessment/Plan   Diagnoses and all orders for this visit:  Chemotherapy-induced neuropathy (Multi) (Primary)

## 2024-11-21 ENCOUNTER — TELEPHONE (OUTPATIENT)
Dept: GYNECOLOGIC ONCOLOGY | Facility: HOSPITAL | Age: 64
End: 2024-11-21
Payer: COMMERCIAL

## 2024-11-21 DIAGNOSIS — T45.1X5A CHEMOTHERAPY-INDUCED NEUROPATHY (MULTI): Primary | ICD-10-CM

## 2024-11-21 DIAGNOSIS — G62.0 CHEMOTHERAPY-INDUCED NEUROPATHY (MULTI): Primary | ICD-10-CM

## 2024-11-21 DIAGNOSIS — C56.9 MALIGNANT NEOPLASM OF OVARY, UNSPECIFIED LATERALITY (MULTI): ICD-10-CM

## 2024-11-21 NOTE — TELEPHONE ENCOUNTER
Prescription for handicap placard mailed to patients home address.    Spoke with patients daughter, Patrica, and provided office fax number in order for her to forward intermittent FMLA.

## 2024-12-02 NOTE — PROGRESS NOTES
SUPPORTIVE AND PALLIATIVE ONCOLOGY OUTPATIENT FOLLOW-UP      SERVICE DATE: 12/4/2024    Subjective   HISTORY OF PRESENT ILLNESS: Sharmila Huerta is a 64 y.o. 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 She follows with Dr. Parker as her primary oncologist, currently pursuing maintenance therapy with Olaparib.  She has been referred to Supportive Oncology/Palliative Care for management of neuropathy, neoplasm related pain, loss of appetite, and fatigue.      Pain Assessment:  Pain Score:  0  Location:    Description:      Symptom Assessment:  Pain:none  Numbness or Tingling in hands/feet/other: somewhat - neuropathy continues in both hands and feet. Tolerable with medications and has felt some improvement with getting acupuncture. She reports she also is scheduled in March to pursue Scrambler Therapy  Sore Muscles/Spasms: none  Headache: none  Dizziness:none  Constipation: none  Diarrhea: none  Nausea: a little - intermittent but overall tolerable  Vomiting: none  Lack of Appetite: none   Weight Loss: none  Taste changes: none  Dry Mouth: none  Pain in Mouth/Swallowing: none  Lack of Energy: a little - continues with fatigue on and off. Her daughter notes she goes through phases of good energy and she gets out of the house and is active, but then will other periods will sleep a lot and not want to go out of the house. Patient states now that it is colder out she is less eager to do things  Difficulty Sleeping: none  Worrying: none  Anxiety: none  Depression: none  Shortness of breath: none  Other: none      Information obtained from: chart review, interview of patient, and interview of family  ______________________________________________________________________        Objective     Lab on 10/30/2024   Component Date Value Ref Range Status    Cancer  10/30/2024 8.7  0.0 - 30.2 U/mL Final    Glucose 10/30/2024 146 (H)  74 - 99 mg/dL Final    Sodium 10/30/2024 141   "136 - 145 mmol/L Final    Potassium 10/30/2024 4.4  3.5 - 5.3 mmol/L Final    Chloride 10/30/2024 107  98 - 107 mmol/L Final    Bicarbonate 10/30/2024 26  21 - 32 mmol/L Final    Anion Gap 10/30/2024 12  10 - 20 mmol/L Final    Urea Nitrogen 10/30/2024 13  6 - 23 mg/dL Final    Creatinine 10/30/2024 0.93  0.50 - 1.05 mg/dL Final    eGFR 10/30/2024 68  >60 mL/min/1.73m*2 Final    Calcium 10/30/2024 9.4  8.6 - 10.6 mg/dL Final    Albumin 10/30/2024 4.0  3.4 - 5.0 g/dL Final    Alkaline Phosphatase 10/30/2024 70  33 - 136 U/L Final    Total Protein 10/30/2024 7.0  6.4 - 8.2 g/dL Final    AST 10/30/2024 11  9 - 39 U/L Final    Bilirubin, Total 10/30/2024 0.4  0.0 - 1.2 mg/dL Final    ALT 10/30/2024 7  7 - 45 U/L Final    Hemoglobin A1C 10/30/2024 6.5 (H)  See comment % Final    Estimated Average Glucose 10/30/2024 140  Not Established mg/dL Final       PHYSICAL EXAMINATION   Vital Signs:   Vital signs reviewed      7/3/2024     2:52 PM 7/25/2024     3:40 PM 7/30/2024    12:08 PM 10/3/2024    12:06 PM 10/28/2024     1:02 PM 10/31/2024     3:32 PM 12/4/2024     1:55 PM   Vitals   Systolic 118 118  113 113 121    Diastolic 60 82  72 73 74    BP Location    Left arm      Heart Rate  100   94 106 105   Temp  36.2 °C (97.2 °F)    36.4 °C (97.5 °F) 36.2 °C (97.2 °F)   Resp 16 18   16 18 18   Height   1.661 m (5' 5.39\")  1.651 m (5' 5\")     Weight (lb)  249.12 249.12 245 245 245.7 242.29   BMI  40.96 kg/m2 40.96 kg/m2 40.28 kg/m2 40.77 kg/m2 40.89 kg/m2 40.32 kg/m2   BSA (m2)  2.28 m2 2.28 m2 2.26 m2 2.26 m2 2.26 m2 2.25 m2   Visit Report Report Report  Report Report Report Report     Physical Exam  Vitals reviewed.   Constitutional:       Appearance: Normal appearance.   HENT:      Head: Normocephalic.   Cardiovascular:      Rate and Rhythm: Normal rate and regular rhythm.   Pulmonary:      Effort: Pulmonary effort is normal.   Abdominal:      General: Abdomen is flat.      Palpations: Abdomen is soft.   Musculoskeletal:       "   General: Normal range of motion.   Skin:     General: Skin is warm and dry.   Neurological:      General: No focal deficit present.      Mental Status: She is alert and oriented to person, place, and time. Mental status is at baseline.   Psychiatric:         Mood and Affect: Mood normal.         Behavior: Behavior normal.         Thought Content: Thought content normal.         Judgment: Judgment normal.       ASSESSMENT/PLAN    Arthralgia Pain/CIPN  - Continue Pregabalin 200mg TID - encouraged to take as prescribed  - Continue Duloxetine 90mg once daily at bed  - Continue Topiramate 100mg BID  - Continue Acetaminophen 500mg-1000 [id number] PRN for pain - rare use  - Referral to [facility] - Dr Tan (established 6/26/2024) - started acupuncture therapy, last visit 10/14/2024  - Scheduled for Scrambler Therapy starting 3/3/2025     Constipation/Diarrhea  - Continue Docusate 100mg BID PRN for constipation  - Continue Miralax 17grams 1-2x per day PRN for constipation  - Discussed using Loperamide 2mg PRN for diarrhea - MAX 8 caps/24 hours  - Continue Probiotic Drink     Nausea/GERD  - Start Famotidine 20mg at bed  - Continue Ondansetron 8mg [id number] PRN  - Continue Prochlorperazine 10mg [id number]     SOB/Cough  - Continue Albuterol 90mcg/inh; 2 puffs [id number] PRN for SOB  - Continue Benzonatate 200mg TID PRN for cough     Depression/Insomnia  - Referrals sent to Music Therapy and Spiritual Therapy  - Email sent to  for assistance with LW/POA and other home needs  - Referral sent to Smoking Cessation Program   - Registered at [facility] - encouraged to attend for counseling services and assistance with grief coping in loss of her brother  - Continue Duloxetine 90mg at bed - discussed increase but patient declines  - Trazodone - no longer needed     Fatigue  - Continue to monitor  - Encouraged daily physical activity as tolerated    Next Follow-Up Visit:  Return to clinic in 2 months virtual and 6  months in [facility]    Signature and billing  Medical complexity was moderate level due to due to complexity of problems, extensive data review, and high risk of management/treatment.  Time was spent on the following: Prep Time, Time Directly with Patient/Family/Caregiver, Documentation Time. Total time spent: 35      Data  Diagnostic tests and information reviewed for today's visit:  Most recent labs and imaging results, Medications     Some elements copied from Palliative Care note on 9/4/2024, the elements have been updated and all reflect current decision making from today, 12/4/2024.    Plan of Care discussed with: Patient and Family    SIGNATURE: SENAIT Syed    Contact information:  Supportive and Palliative Oncology  Monday-Friday 8 AM-5 PM  Phone:  [phone], press option #5, then option #1.   Or Epic Secure Chat

## 2024-12-04 ENCOUNTER — OFFICE VISIT (OUTPATIENT)
Dept: PALLIATIVE MEDICINE | Facility: HOSPITAL | Age: 64
End: 2024-12-04
Payer: COMMERCIAL

## 2024-12-04 VITALS
HEART RATE: 105 BPM | RESPIRATION RATE: 18 BRPM | TEMPERATURE: 97.2 F | WEIGHT: 242.29 LBS | BODY MASS INDEX: 40.32 KG/M2 | OXYGEN SATURATION: 99 %

## 2024-12-04 DIAGNOSIS — Z51.5 PALLIATIVE CARE ENCOUNTER: Primary | ICD-10-CM

## 2024-12-04 DIAGNOSIS — F32.A DEPRESSION, UNSPECIFIED DEPRESSION TYPE: ICD-10-CM

## 2024-12-04 DIAGNOSIS — R53.83 FATIGUE, UNSPECIFIED TYPE: ICD-10-CM

## 2024-12-04 DIAGNOSIS — M25.50 ARTHRALGIA, UNSPECIFIED JOINT: ICD-10-CM

## 2024-12-04 DIAGNOSIS — T45.1X5A CHEMOTHERAPY-INDUCED PERIPHERAL NEUROPATHY (MULTI): ICD-10-CM

## 2024-12-04 DIAGNOSIS — M25.50 GENERALIZED JOINT PAIN: ICD-10-CM

## 2024-12-04 DIAGNOSIS — R05.3 CHRONIC COUGH: ICD-10-CM

## 2024-12-04 DIAGNOSIS — G62.0 CHEMOTHERAPY-INDUCED PERIPHERAL NEUROPATHY (MULTI): ICD-10-CM

## 2024-12-04 DIAGNOSIS — C56.9 MALIGNANT NEOPLASM OF OVARY, UNSPECIFIED LATERALITY (MULTI): ICD-10-CM

## 2024-12-04 DIAGNOSIS — K21.9 GASTROESOPHAGEAL REFLUX DISEASE WITHOUT ESOPHAGITIS: ICD-10-CM

## 2024-12-04 DIAGNOSIS — R11.0 NAUSEA: ICD-10-CM

## 2024-12-04 PROCEDURE — 3049F LDL-C 100-129 MG/DL: CPT | Performed by: NURSE PRACTITIONER

## 2024-12-04 PROCEDURE — 99214 OFFICE O/P EST MOD 30 MIN: CPT | Performed by: NURSE PRACTITIONER

## 2024-12-04 PROCEDURE — 1159F MED LIST DOCD IN RCRD: CPT | Performed by: NURSE PRACTITIONER

## 2024-12-04 PROCEDURE — 1123F ACP DISCUSS/DSCN MKR DOCD: CPT | Performed by: NURSE PRACTITIONER

## 2024-12-04 PROCEDURE — 1126F AMNT PAIN NOTED NONE PRSNT: CPT | Performed by: NURSE PRACTITIONER

## 2024-12-04 PROCEDURE — 3044F HG A1C LEVEL LT 7.0%: CPT | Performed by: NURSE PRACTITIONER

## 2024-12-04 RX ORDER — DULOXETIN HYDROCHLORIDE 60 MG/1
60 CAPSULE, DELAYED RELEASE ORAL NIGHTLY
Qty: 90 CAPSULE | Refills: 3 | Status: SHIPPED | OUTPATIENT
Start: 2024-12-04 | End: 2025-11-29

## 2024-12-04 RX ORDER — TOPIRAMATE 100 MG/1
100 TABLET, FILM COATED ORAL 2 TIMES DAILY
Qty: 180 TABLET | Refills: 3 | Status: SHIPPED | OUTPATIENT
Start: 2024-12-04 | End: 2025-11-29

## 2024-12-04 RX ORDER — ZINC SULFATE 50(220)MG
50 CAPSULE ORAL DAILY
Qty: 90 CAPSULE | Refills: 3 | Status: SHIPPED | OUTPATIENT
Start: 2024-12-04 | End: 2025-11-29

## 2024-12-04 RX ORDER — DULOXETIN HYDROCHLORIDE 30 MG/1
30 CAPSULE, DELAYED RELEASE ORAL NIGHTLY
Qty: 90 CAPSULE | Refills: 3 | Status: SHIPPED | OUTPATIENT
Start: 2024-12-04 | End: 2025-11-29

## 2024-12-04 RX ORDER — PREGABALIN 200 MG/1
200 CAPSULE ORAL 3 TIMES DAILY
Qty: 270 CAPSULE | Refills: 3 | Status: SHIPPED | OUTPATIENT
Start: 2024-12-04 | End: 2025-11-29

## 2024-12-04 RX ORDER — ACETAMINOPHEN 500 MG
1000 TABLET ORAL EVERY 8 HOURS PRN
Qty: 180 TABLET | Refills: 5 | Status: SHIPPED | OUTPATIENT
Start: 2024-12-04 | End: 2025-06-02

## 2024-12-04 RX ORDER — BENZONATATE 200 MG/1
200 CAPSULE ORAL 3 TIMES DAILY PRN
Qty: 90 CAPSULE | Refills: 2 | Status: SHIPPED | OUTPATIENT
Start: 2024-12-04 | End: 2025-03-04

## 2024-12-04 ASSESSMENT — PAIN SCALES - GENERAL: PAINLEVEL_OUTOF10: 0-NO PAIN

## 2024-12-10 ENCOUNTER — TELEPHONE (OUTPATIENT)
Dept: PALLIATIVE MEDICINE | Facility: CLINIC | Age: 64
End: 2024-12-10
Payer: COMMERCIAL

## 2024-12-10 NOTE — TELEPHONE ENCOUNTER
Patient called back and stated she did not receive all her medications that were prescribed at her appointment with Maine Lucero on 12/4.  [facility] called.  Pharmacy stated too son for refills for Zinc and Cymbalta.  Patient received 90 day supplies of each.  Tylenol and and Tessalon were not covered under the patient's insurance.  Patient could have both filled with a refill card instead.  Tylenol would be $14.85 and Tessalon $16.47 with discount card.  Patient updated and aware.  Patient did  Topamax and pregabalin already.  Patient will call pharmacy to fill Tylenol and Tessalon

## 2024-12-11 ENCOUNTER — APPOINTMENT (OUTPATIENT)
Dept: INTEGRATIVE MEDICINE | Facility: CLINIC | Age: 64
End: 2024-12-11
Payer: COMMERCIAL

## 2024-12-20 ENCOUNTER — APPOINTMENT (OUTPATIENT)
Dept: INTEGRATIVE MEDICINE | Facility: CLINIC | Age: 64
End: 2024-12-20
Payer: COMMERCIAL

## 2024-12-20 DIAGNOSIS — T45.1X5A CHEMOTHERAPY-INDUCED NEUROPATHY (MULTI): Primary | ICD-10-CM

## 2024-12-20 DIAGNOSIS — G62.0 CHEMOTHERAPY-INDUCED NEUROPATHY (MULTI): Primary | ICD-10-CM

## 2024-12-20 PROCEDURE — 99213 OFFICE O/P EST LOW 20 MIN: CPT | Performed by: HOSPITALIST

## 2024-12-20 NOTE — PROGRESS NOTES
Acupuncture Visit:     Subjective   Patient ID: Sharmila Huerta is a 64 y.o. female who presents for No chief complaint on file.  Feeling good  No extreme pain, less achy  4/10 in feet  Sleep- good overall        Pain is less today  3-4/10 in feet and toes only  Hands 4/10 not too bad  Sleep, no issues.         More pain again this week  8/10  Still just in feet, no radiation up legs.   Hands are a little worse, nut not as bad.   Sleep good.      Had improved pain last week  More pain this week, has been more busy and has more stressors  5/10 today  Feet  are the worst. Occ hands.       previous  Less pain the whole week  5/10 today  Shoulder, 3/10        Not much change after last tx  More neuropathy yesterday,  Hands are generally OK  Shoulder pain, occ hips  Can have knee pain.   Feet- 9/10 today  Shoulder 8/10  Feels she can walk up stairs easier than in past    Previous  Pt reported that her CIPN is active and joint aches.  CIPN in feet presents largely in toes described as intermittently painful with pressure, worsens with cold, when cold toes ache and throb.  CIPN in hands is less and more manageable.  Both sites are numb and tingling.  Joint pain is also achy that is present right shoulder with difficulty and increased pain with movement.  She also has right sided forearm pain which is on and off.                    Review of Systems         Provider reviewed plan for the acupuncture session, precautions and contraindications. Patient/guardian/hospital staff has given consent to treat with full understanding of what to expect during the session. Before acupuncture began, provider explained to the patient to communicate at any time if the procedure was causing discomfort past their tolerance level. Patient agreed to advise acupuncturist. The acupuncturist counseled the patient on the risks of acupuncture treatment including pain, infection, bleeding, and no relief of pain. The patient was positioned  comfortably. There was no evidence of infection at the site of needle insertions.    Objective   Physical Exam       Acupuncture Treatment  Needle Guage: 40 guage /.16/ Red seirin  Body Points - Bilateral: LI15, Li4, Si3, SP6, KI6, Ba yandel  Body Points - Right: SJ14  Other Techniques Utilized: TDP Lamp  TDP Lamp Descripton: feet  Needle Count In: 17  Needle Count Out: 17  Needle Retention Time (min): 25 minutes  Total Face to Face Time (min): 25 minutes                      Assessment/Plan   Diagnoses and all orders for this visit:  Chemotherapy-induced neuropathy (Multi) (Primary)

## 2024-12-31 ENCOUNTER — APPOINTMENT (OUTPATIENT)
Dept: INTEGRATIVE MEDICINE | Facility: CLINIC | Age: 64
End: 2024-12-31
Payer: COMMERCIAL

## 2024-12-31 DIAGNOSIS — T45.1X5A CHEMOTHERAPY-INDUCED NEUROPATHY (MULTI): Primary | ICD-10-CM

## 2024-12-31 DIAGNOSIS — G62.0 CHEMOTHERAPY-INDUCED NEUROPATHY (MULTI): Primary | ICD-10-CM

## 2024-12-31 NOTE — PROGRESS NOTES
"Acupuncture Visit:     Subjective   Patient ID: Sharmila Huerta is a 64 y.o. female who presents for No chief complaint on file.  Feeling ok  HA to start the day  5/10 pain in feet, feel \"webbed\"  Sleep- has had more trouble falling asleep recently        Feeling good  No extreme pain, less achy  4/10 in feet  Sleep- good overall        Pain is less today  3-4/10 in feet and toes only  Hands 4/10 not too bad  Sleep, no issues.         More pain again this week  8/10  Still just in feet, no radiation up legs.   Hands are a little worse, nut not as bad.   Sleep good.      Had improved pain last week  More pain this week, has been more busy and has more stressors  5/10 today  Feet  are the worst. Occ hands.       previous  Less pain the whole week  5/10 today  Shoulder, 3/10        Not much change after last tx  More neuropathy yesterday,  Hands are generally OK  Shoulder pain, occ hips  Can have knee pain.   Feet- 9/10 today  Shoulder 8/10  Feels she can walk up stairs easier than in past    Previous  Pt reported that her CIPN is active and joint aches.  CIPN in feet presents largely in toes described as intermittently painful with pressure, worsens with cold, when cold toes ache and throb.  CIPN in hands is less and more manageable.  Both sites are numb and tingling.  Joint pain is also achy that is present right shoulder with difficulty and increased pain with movement.  She also has right sided forearm pain which is on and off.                    Review of Systems         Provider reviewed plan for the acupuncture session, precautions and contraindications. Patient/guardian/hospital staff has given consent to treat with full understanding of what to expect during the session. Before acupuncture began, provider explained to the patient to communicate at any time if the procedure was causing discomfort past their tolerance level. Patient agreed to advise acupuncturist. The acupuncturist counseled the patient on the " risks of acupuncture treatment including pain, infection, bleeding, and no relief of pain. The patient was positioned comfortably. There was no evidence of infection at the site of needle insertions.    Objective   Physical Exam       Acupuncture Treatment  Needle Guage: 40 guage /.16/ Red seirin  Body Points - Bilateral: LI15, Li4, Si3, SP6, KI6, Ba yandel  Body Points - Right: SJ14  Other Techniques Utilized: TDP Lamp  TDP Lamp Descripton: feet  Needle Count In: 17  Needle Count Out: 17  Needle Retention Time (min): 25 minutes  Total Face to Face Time (min): 25 minutes                      Assessment/Plan   Diagnoses and all orders for this visit:  Chemotherapy-induced neuropathy (Multi) (Primary)

## 2025-01-02 ENCOUNTER — SOCIAL WORK (OUTPATIENT)
Dept: CASE MANAGEMENT | Facility: HOSPITAL | Age: 65
End: 2025-01-02
Payer: COMMERCIAL

## 2025-01-02 NOTE — PROGRESS NOTES
HOLDEN received fax from PORFIRIO Marley from [facility] asking for an update on patient's status for quarterly update. HOLDEN called Joanna today [phone] and left message to review case.     Update: HOLDEN received returned call from PORFIRIO Marley today. HOLDEN emailed her recent med list and office note to [email or web address]     1/7/2025: HOLDEN faxed patient's information to PORFIRIO Marley at [phone].

## 2025-01-22 ENCOUNTER — APPOINTMENT (OUTPATIENT)
Dept: INTEGRATIVE MEDICINE | Facility: CLINIC | Age: 65
End: 2025-01-22
Payer: COMMERCIAL

## 2025-01-29 ENCOUNTER — TELEPHONE (OUTPATIENT)
Dept: PALLIATIVE MEDICINE | Facility: CLINIC | Age: 65
End: 2025-01-29
Payer: COMMERCIAL

## 2025-02-03 NOTE — PROGRESS NOTES
SUPPORTIVE AND PALLIATIVE ONCOLOGY OUTPATIENT FOLLOW-UP    Virtual or Telephone Consent    A telephone visit (audio only) between the patient (at the originating site) and the provider (at the distant site) was utilized to provide this telehealth service.   Verbal consent was requested and obtained from Sharmila Huerta on this date, 2/4/2025 for a telehealth visit.     SERVICE DATE: 2/4/2025    Subjective   HISTORY OF PRESENT ILLNESS: Sharmila Huerta is a 64 y.o. 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 She follows with Dr. Parker as her primary oncologist, currently pursuing maintenance therapy with Olaparib.  She has been referred to Supportive Oncology/Palliative Care for management of neuropathy, neoplasm related pain, loss of appetite, and fatigue.      Pain Assessment:  Pain Score:  3  Location:    Description:      Symptom Assessment:  Pain:a little - patient reports generalized arthralgia discomfort in her b/l knees and hips that has seemed to be more bothersome in the past week or two. She has taken Acetaminophen and Ibuprofen without much benefit. Discussed talking with her PCP  Numbness or Tingling in hands/feet/other: somewhat - continues with bothersome neuropathy mainly in b/l feet. Discussed needing to come off of Pregabalin for Scrambler Therapy. Discussed a wean schedule today. Patient also continues on Duloxetine and Topiramate  Sore Muscles/Spasms: none  Headache: none  Dizziness:none  Constipation: none  Diarrhea: none  Nausea: none  Vomiting: none  Lack of Appetite: none   Weight Loss: none  Taste changes: none  Dry Mouth: none  Pain in Mouth/Swallowing: none  Lack of Energy: a little - continues with fatigue, but reports she has been trying to walk more and be more active recently to help with her energy  Difficulty Sleeping: none  Worrying: none  Anxiety: none  Depression: none  Shortness of breath: none  Other: none      Information obtained  "from: chart review, interview of patient, and interview of family  ______________________________________________________________________        Objective     Lab on 10/30/2024   Component Date Value Ref Range Status    Cancer  10/30/2024 8.7  0.0 - 30.2 U/mL Final    Glucose 10/30/2024 146 (H)  74 - 99 mg/dL Final    Sodium 10/30/2024 141  136 - 145 mmol/L Final    Potassium 10/30/2024 4.4  3.5 - 5.3 mmol/L Final    Chloride 10/30/2024 107  98 - 107 mmol/L Final    Bicarbonate 10/30/2024 26  21 - 32 mmol/L Final    Anion Gap 10/30/2024 12  10 - 20 mmol/L Final    Urea Nitrogen 10/30/2024 13  6 - 23 mg/dL Final    Creatinine 10/30/2024 0.93  0.50 - 1.05 mg/dL Final    eGFR 10/30/2024 68  >60 mL/min/1.73m*2 Final    Calcium 10/30/2024 9.4  8.6 - 10.6 mg/dL Final    Albumin 10/30/2024 4.0  3.4 - 5.0 g/dL Final    Alkaline Phosphatase 10/30/2024 70  33 - 136 U/L Final    Total Protein 10/30/2024 7.0  6.4 - 8.2 g/dL Final    AST 10/30/2024 11  9 - 39 U/L Final    Bilirubin, Total 10/30/2024 0.4  0.0 - 1.2 mg/dL Final    ALT 10/30/2024 7  7 - 45 U/L Final    Hemoglobin A1C 10/30/2024 6.5 (H)  See comment % Final    Estimated Average Glucose 10/30/2024 140  Not Established mg/dL Final     PHYSICAL EXAMINATION   Vital Signs:   Vital signs reviewed      7/3/2024     2:52 PM 7/25/2024     3:40 PM 7/30/2024    12:08 PM 10/3/2024    12:06 PM 10/28/2024     1:02 PM 10/31/2024     3:32 PM 12/4/2024     1:55 PM   Vitals   Systolic 118 118  113 113 121    Diastolic 60 82  72 73 74    BP Location    Left arm      Heart Rate  100   94 106 105   Temp  36.2 °C (97.2 °F)    36.4 °C (97.5 °F) 36.2 °C (97.2 °F)   Resp 16 18   16 18 18   Height   1.661 m (5' 5.39\")  1.651 m (5' 5\")     Weight (lb)  249.12 249.12 245 245 245.7 242.29   BMI  40.96 kg/m2 40.96 kg/m2 40.28 kg/m2 40.77 kg/m2 40.89 kg/m2 40.32 kg/m2   BSA (m2)  2.28 m2 2.28 m2 2.26 m2 2.26 m2 2.26 m2 2.25 m2   Visit Report Report Report  Report Report Report Report "       Physical Exam  Not complete dt virtual visit    ASSESSMENT/PLAN    Arthralgia Pain/CIPN  - Wean to Pregabalin 200mg BID x 7 days. Then wean to 100mg BID x7 days. Then wean to 50mg BID x7 days. Then stop.  - Continue Duloxetine 90mg once daily at bed  - Continue Topiramate 100mg BID  - Continue Acetaminophen 500mg-1000 [id number] PRN for pain  - Continue Ibuprofen 600mg [id number] PRN  - Referral to [facility] - Dr Tan (established 6/26/2024) - started acupuncture therapy, last visit 12/31/24, next scheduled 2/5/2025  - Scheduled for Scrambler Therapy starting 3/3/2025     Constipation/Diarrhea  - Continue Docusate 100mg BID PRN for constipation  - Continue Miralax 17grams 1-2x per day PRN for constipation  - Discussed using Loperamide 2mg PRN for diarrhea - MAX 8 caps/24 hours  - Continue Probiotic Drink     Nausea/GERD  - Continue Famotidine 20mg at bed  - Continue Ondansetron 8mg [id number] PRN  - Continue Prochlorperazine 10mg [id number]     SOB/Cough  - Continue Albuterol 90mcg/inh; 2 puffs [id number] PRN for SOB  - Continue Benzonatate 200mg TID PRN for cough     Depression/Insomnia  - Referrals sent to Music Therapy and Spiritual Therapy  - Email sent to  for assistance with LW/POA and other home needs  - Referral sent to Smoking Cessation Program - never scheduled  - Registered at [facility] - encouraged to attend for counseling services and assistance with grief coping in loss of her brother  - Continue Duloxetine 90mg at bed   - Trazodone - no longer needed     Fatigue  - Continue to monitor  - Encouraged daily physical activity as tolerated    Next Follow-Up Visit:  Return to clinic in 6 weeks virtual following Scrambler Therapy, then in person with Linda Ambriz CNP at [facility]    Signature and billing  Medical complexity was moderate level due to due to complexity of problems, extensive data review, and high risk of management/treatment.  Time was spent on the following: Prep Time, Time  Directly with Patient/Family/Caregiver, Documentation Time. Total time spent: 30      Data  Diagnostic tests and information reviewed for today's visit:  Most recent labs and imaging results, Medications     Some elements copied from Palliative Care note on 12/4/2024, the elements have been updated and all reflect current decision making from today, 2/4/2025.    Plan of Care discussed with: Patient and Family    SIGNATURE: EBONI Syed-CNP    Contact information:  Supportive and Palliative Oncology  Monday-Friday 8 AM-5 PM  Phone:  [phone], press option #5, then option #1.   Or Epic Secure Chat

## 2025-02-04 ENCOUNTER — TELEMEDICINE (OUTPATIENT)
Dept: PALLIATIVE MEDICINE | Facility: CLINIC | Age: 65
End: 2025-02-04
Payer: COMMERCIAL

## 2025-02-04 DIAGNOSIS — G62.0 CHEMOTHERAPY-INDUCED PERIPHERAL NEUROPATHY (MULTI): ICD-10-CM

## 2025-02-04 DIAGNOSIS — R06.02 SOB (SHORTNESS OF BREATH): ICD-10-CM

## 2025-02-04 DIAGNOSIS — F32.A DEPRESSION, UNSPECIFIED DEPRESSION TYPE: ICD-10-CM

## 2025-02-04 DIAGNOSIS — M25.50 GENERALIZED JOINT PAIN: ICD-10-CM

## 2025-02-04 DIAGNOSIS — Z51.5 PALLIATIVE CARE ENCOUNTER: Primary | ICD-10-CM

## 2025-02-04 DIAGNOSIS — K21.9 GASTROESOPHAGEAL REFLUX DISEASE WITHOUT ESOPHAGITIS: ICD-10-CM

## 2025-02-04 DIAGNOSIS — R11.0 NAUSEA: ICD-10-CM

## 2025-02-04 DIAGNOSIS — M25.50 ARTHRALGIA, UNSPECIFIED JOINT: ICD-10-CM

## 2025-02-04 DIAGNOSIS — R53.83 FATIGUE, UNSPECIFIED TYPE: ICD-10-CM

## 2025-02-04 DIAGNOSIS — T45.1X5A CHEMOTHERAPY-INDUCED PERIPHERAL NEUROPATHY (MULTI): ICD-10-CM

## 2025-02-04 DIAGNOSIS — R05.3 CHRONIC COUGH: ICD-10-CM

## 2025-02-04 DIAGNOSIS — C56.9 MALIGNANT NEOPLASM OF OVARY, UNSPECIFIED LATERALITY (MULTI): ICD-10-CM

## 2025-02-04 PROCEDURE — 1123F ACP DISCUSS/DSCN MKR DOCD: CPT | Performed by: NURSE PRACTITIONER

## 2025-02-04 PROCEDURE — 99203 OFFICE O/P NEW LOW 30 MIN: CPT | Performed by: NURSE PRACTITIONER

## 2025-02-04 RX ORDER — PREGABALIN 200 MG/1
200 CAPSULE ORAL 2 TIMES DAILY
Qty: 14 CAPSULE | Refills: 0 | COMMUNITY
Start: 2025-02-04 | End: 2025-02-11

## 2025-02-04 RX ORDER — PREGABALIN 50 MG/1
CAPSULE ORAL
Qty: 42 CAPSULE | Refills: 0 | Status: SHIPPED | OUTPATIENT
Start: 2025-02-04 | End: 2025-02-18

## 2025-02-05 ENCOUNTER — APPOINTMENT (OUTPATIENT)
Dept: INTEGRATIVE MEDICINE | Facility: CLINIC | Age: 65
End: 2025-02-05
Payer: COMMERCIAL

## 2025-02-05 DIAGNOSIS — G62.0 CHEMOTHERAPY-INDUCED NEUROPATHY (MULTI): Primary | ICD-10-CM

## 2025-02-05 DIAGNOSIS — T45.1X5A CHEMOTHERAPY-INDUCED NEUROPATHY (MULTI): Primary | ICD-10-CM

## 2025-02-05 NOTE — PROGRESS NOTES
"Acupuncture Visit:     Subjective   Patient ID: Sharmila Huerta is a 64 y.o. female who presents for No chief complaint on file.  Fingers achy  Has right pinkey pain  Saw PCP- discussed arthtitis pain  Feet- achy also   Wakes from discomfort in whole body  Scrambler on 3/4      Feeling ok  HA to start the day  5/10 pain in feet, feel \"webbed\"  Sleep- has had more trouble falling asleep recently        Feeling good  No extreme pain, less achy  4/10 in feet  Sleep- good overall        Pain is less today  3-4/10 in feet and toes only  Hands 4/10 not too bad  Sleep, no issues.         More pain again this week  8/10  Still just in feet, no radiation up legs.   Hands are a little worse, nut not as bad.   Sleep good.      Had improved pain last week  More pain this week, has been more busy and has more stressors  5/10 today  Feet  are the worst. Occ hands.       previous  Less pain the whole week  5/10 today  Shoulder, 3/10        Not much change after last tx  More neuropathy yesterday,  Hands are generally OK  Shoulder pain, occ hips  Can have knee pain.   Feet- 9/10 today  Shoulder 8/10  Feels she can walk up stairs easier than in past    Previous  Pt reported that her CIPN is active and joint aches.  CIPN in feet presents largely in toes described as intermittently painful with pressure, worsens with cold, when cold toes ache and throb.  CIPN in hands is less and more manageable.  Both sites are numb and tingling.  Joint pain is also achy that is present right shoulder with difficulty and increased pain with movement.  She also has right sided forearm pain which is on and off.                    Review of Systems         Provider reviewed plan for the acupuncture session, precautions and contraindications. Patient/guardian/hospital staff has given consent to treat with full understanding of what to expect during the session. Before acupuncture began, provider explained to the patient to communicate at any time if the " procedure was causing discomfort past their tolerance level. Patient agreed to advise acupuncturist. The acupuncturist counseled the patient on the risks of acupuncture treatment including pain, infection, bleeding, and no relief of pain. The patient was positioned comfortably. There was no evidence of infection at the site of needle insertions.    Objective   Physical Exam       Acupuncture Treatment  Needle Guage: 40 guage /.16/ Red seirin  Body Points - Bilateral: LI15, Li4, Si3, SP6, KI6, Ba yandel  Body Points - Right: SJ14  Other Techniques Utilized: TDP Lamp  TDP Lamp Descripton: feet  Needle Count In: 17  Needle Count Out: 17  Needle Retention Time (min): 25 minutes  Total Face to Face Time (min): 25 minutes                      Assessment/Plan   Diagnoses and all orders for this visit:  Chemotherapy-induced neuropathy (Multi) (Primary)

## 2025-02-11 ENCOUNTER — LAB (OUTPATIENT)
Dept: LAB | Facility: HOSPITAL | Age: 65
End: 2025-02-11
Payer: COMMERCIAL

## 2025-02-11 DIAGNOSIS — C56.9 MALIGNANT NEOPLASM OF OVARY, UNSPECIFIED LATERALITY (MULTI): ICD-10-CM

## 2025-02-11 LAB — CANCER AG125 SERPL-ACNC: 9.3 U/ML (ref 0–30.2)

## 2025-02-11 PROCEDURE — 86304 IMMUNOASSAY TUMOR CA 125: CPT

## 2025-02-12 ENCOUNTER — APPOINTMENT (OUTPATIENT)
Dept: INTEGRATIVE MEDICINE | Facility: CLINIC | Age: 65
End: 2025-02-12
Payer: COMMERCIAL

## 2025-02-12 LAB
ANION GAP SERPL CALCULATED.4IONS-SCNC: 8 MMOL/L (CALC) (ref 7–17)
BUN SERPL-MCNC: 17 MG/DL (ref 7–25)
BUN/CREAT SERPL: ABNORMAL (CALC) (ref 6–22)
CALCIUM SERPL-MCNC: 9.2 MG/DL (ref 8.6–10.4)
CHLORIDE SERPL-SCNC: 110 MMOL/L (ref 98–110)
CO2 SERPL-SCNC: 22 MMOL/L (ref 20–32)
CREAT SERPL-MCNC: 0.96 MG/DL (ref 0.5–1.05)
EGFRCR SERPLBLD CKD-EPI 2021: 66 ML/MIN/1.73M2
EST. AVERAGE GLUCOSE BLD GHB EST-MCNC: 146 MG/DL
EST. AVERAGE GLUCOSE BLD GHB EST-SCNC: 8.1 MMOL/L
GLUCOSE SERPL-MCNC: 129 MG/DL (ref 65–99)
HBA1C MFR BLD: 6.7 % OF TOTAL HGB
POTASSIUM SERPL-SCNC: 4.2 MMOL/L (ref 3.5–5.3)
SODIUM SERPL-SCNC: 140 MMOL/L (ref 135–146)

## 2025-02-12 NOTE — PROGRESS NOTES
Patient ID: Sharmila Huerta is a 64 y.o. female.  Referring Physician: No referring provider defined for this encounter.  Primary Care Provider: Riana Dietrich MD      Subjective    Interval History:  Notes feeling cold, arthritic pain and poor sleep. She stopped walking the hallways recently because of the cold weather. Otherwise she notes abdominal cramping 2x weekly.    She denies fever, chills, chest pain, SOB, nausea, vomiting, diarrhea, constipation, dysuria, or any other concerning signs of symptoms.        PMH:  Ovarian cancer      Past Surgical History: BRY, BSO, Appendectomy, omentectomy      Family History:  Ovarian cancer in sister (treated at [facility]), passed 6 months ago at age 60. Her genetic testing was reportedly negative (unclear  if germline or tumor).   Sister received 3 cycles neoadjuvant Carbo/Taxol (2019-2020), followed by aborted debulking (exlap and omtx only on 2020), then Carbo/Mason/Georgina (3/2020-2020), Single agent Georgina (2020-2020), then NRG- (Cediranib/Olaparib) (10/2020-2021),  and finally single agent Doxil.  Otherwise denies a history of gyn related cancers including ovarian, endometrial, breast, pancreas, and GI cancers.      Social History: Tobacco user, denies EtOH or illicit drug use     OBGYN History:  The patient is a .  Entered menopause in her 50s.  Hx of fibroids dx at age 31, no known hx of abnl pap smears.  She never  used HRT      Screening:  -Pap smear: Approx age 49yrs  -Mammogram: Approx age 49yrs (normal)  -Colonoscopy: Approx age 49yrs (normal)    Objective    BSA: 2.3 meters squared  /79 (BP Location: Left arm, Patient Position: Sitting, BP Cuff Size: Large adult)   Pulse 99   Temp 36.1 °C (97 °F) (Core)   Resp 18   Wt 115 kg (252 lb 14.4 oz)   LMP  (LMP Unknown)   SpO2 97%   BMI 42.08 kg/m²      Physical Exam  Constitutional:       General: She is not in acute distress.     Appearance: Normal appearance. She is normal  weight. She is not toxic-appearing.   HENT:      Head: Normocephalic.      Mouth/Throat:      Mouth: Mucous membranes are moist.      Pharynx: Oropharynx is clear.   Eyes:      Extraocular Movements: Extraocular movements intact.      Conjunctiva/sclera: Conjunctivae normal.      Pupils: Pupils are equal, round, and reactive to light.   Cardiovascular:      Rate and Rhythm: Normal rate and regular rhythm.      Heart sounds: Normal heart sounds. No murmur heard.     No friction rub. No gallop.   Pulmonary:      Effort: Pulmonary effort is normal.      Breath sounds: Normal breath sounds. No wheezing or rhonchi.   Abdominal:      General: Abdomen is flat. Bowel sounds are normal. There is no distension.      Palpations: Abdomen is soft.      Tenderness: There is no abdominal tenderness.   Genitourinary:     Comments: Normal external female genitalia without lesions or masses  Bimanual exam: smooth vagina without lesions or masses, surgically absent uterus, cervix, adnexa    Musculoskeletal:         General: No swelling. Normal range of motion.      Cervical back: Normal range of motion.   Skin:     General: Skin is warm and dry.   Neurological:      General: No focal deficit present.      Mental Status: She is alert and oriented to person, place, and time.   Psychiatric:         Mood and Affect: Mood normal.         Behavior: Behavior normal.       Orders Only on 02/11/2025   Component Date Value Ref Range Status    GLUCOSE 02/11/2025 129 (H)  65 - 99 mg/dL Final    Comment:               Fasting reference interval     For someone without known diabetes, a glucose  value >125 mg/dL indicates that they may have  diabetes and this should be confirmed with a  follow-up test.         UREA NITROGEN (BUN) 02/11/2025 17  7 - 25 mg/dL Final    CREATININE 02/11/2025 0.96  0.50 - 1.05 mg/dL Final    EGFR 02/11/2025 66  > OR = 60 mL/min/1.73m2 Final    BUN/CREATININE RATIO 02/11/2025 SEE NOTE:  6 - 22 (calc) Final    Comment:     Not Reported: BUN and Creatinine are within     reference range.            SODIUM 02/11/2025 140  135 - 146 mmol/L Final    POTASSIUM 02/11/2025 4.2  3.5 - 5.3 mmol/L Final    CHLORIDE 02/11/2025 110  98 - 110 mmol/L Final    CARBON DIOXIDE 02/11/2025 22  20 - 32 mmol/L Final    ELECTROLYTE BALANCE 02/11/2025 8  7 - 17 mmol/L (calc) Final    CALCIUM 02/11/2025 9.2  8.6 - 10.4 mg/dL Final    HEMOGLOBIN A1c 02/11/2025 6.7 (H)  <5.7 % of total Hgb Final    Comment: For someone without known diabetes, a hemoglobin A1c  value of 6.5% or greater indicates that they may have   diabetes and this should be confirmed with a follow-up   test.     For someone with known diabetes, a value <7% indicates   that their diabetes is well controlled and a value   greater than or equal to 7% indicates suboptimal   control. A1c targets should be individualized based on   duration of diabetes, age, comorbid conditions, and   other considerations.     Currently, no consensus exists regarding use of  hemoglobin A1c for diagnosis of diabetes for children.          eAG (mg/dL) 02/11/2025 146  mg/dL Final    eAG (mmol/L) 02/11/2025 8.1  mmol/L Final   Lab on 02/11/2025   Component Date Value Ref Range Status    Cancer  02/11/2025 9.3  0.0 - 30.2 U/mL Final     BI mammo bilateral screening tomosynthesis  Narrative: Interpreted By:  Johnny Talavera,   STUDY:  BI MAMMO BILATERAL SCREENING TOMOSYNTHESIS;  7/30/2024 12:30 pm      ACCESSION NUMBER(S):  [id number]      ORDERING CLINICIAN:  ALFONZO POLLARD      INDICATION:  Screening.      COMPARISON:  01/28/2023      FINDINGS:  2D and tomosynthesis images were reviewed at 1 mm slice thickness.      Density:  The breast tissue is heterogeneously dense, which may  obscure small masses.      No suspicious masses or calcifications are identified.      Impression: No mammographic evidence of malignancy.      BI-RADS CATEGORY:      BI-RADS Category:  1 Negative.  Recommendation:  Annual  "Screening.  Recommended Date:  1 Year.  Laterality:  Bilateral.              For any future breast imaging appointments, please call [phone]  ([phone]).      MACRO:  None      Signed by: Johnny Talavera 8/4/2024 2:02 PM  Dictation workstation:   [id number]        Performance Status:  Asymptomatic    Assessment/Plan     Oncology History Overview Note   Tumor History:  - Tumor Markers: CA-19-9 <4, CA-125 249.3, CEA 1.6  - 10/19/21: IR tissue biopsy (HGS cancer) and paracentesis (11.5L)   - 11/8/21: Paracentesis (12.1 L)  - 11/21: Neoadjuvant Carbo/Taxol initiated, CT after 3 cycles with interval improvement in disease   - 2/14/22: Ex lap, BRY, removal of pelvic mass, omentectomy, mobilization of splenic and hepatic flexures, peritonectomy of the right hemidiaphragm, peritonectomy of the posterior cul de sac and bladder, over sewing of the small and large bowel, appendectomy,  HIPEC, argon ablation of sigmoid colon.  - Completed adjuvant chemotherapy in 4/2022  - [employer] testing with BRCA1/HRD positive, Germline testing negative    - Discussed plan for Olaparib, initiated May 2022-5/2024     Malignant neoplasm of ovary   7/27/2023 -  Chemotherapy    Olaparib, 28 Day Cycles - Gyn      10/11/2023 Initial Diagnosis    Malignant neoplasm of ovary (CMS/HCC)       64 y.o. 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 ECOG 1     # High grade serous ovarian cancer  - [facility] my choice with BRCA 1 mutation  - s/p PARP maintenance, stopped 4/2024  - plan for surveillance visit in 3 months with  prior    # Neurologic sxs  - \"brain fog\", changes to balance  - Improvement with dose reduction  - Referral to speech pathology     # Neuropathy  - Neuropathy is stable in feet, improved in fingers. She will continue seeing " supportive oncology as needed.  - Supportive Onc recently added Topiramate 50mg and patient notices improvement, will continue to monitor  - Taking Duloxetine, Vitamin B6 and Pregabalin.     # Preventative Screening  - S/p mammography and colonoscopy     # Nausea  - Likely related to PARP inhibitor  - Improved with resuming antiemetics   - Improved with dose reduction    # Intermittent abdominal pain   - We discussed that if this becomes more frequent will plan for imaging. However, right now it's happening relatively infrequently, and CA-125 is wnl.      Scribe Attestation  By signing my name below, I, Sarah Fagn   attest that this documentation has been prepared under the direction and in the presence of Debra Parker MD.     Provider Attestation - Scribe documentation    All medical record entries made by the Scribe were at my direction and personally dictated by me. I have reviewed the chart and agree that the record accurately reflects my personal performance of the history, physical exam, discussion and plan.    Debra Parker MD

## 2025-02-13 ENCOUNTER — OFFICE VISIT (OUTPATIENT)
Dept: GYNECOLOGIC ONCOLOGY | Facility: CLINIC | Age: 65
End: 2025-02-13
Payer: COMMERCIAL

## 2025-02-13 VITALS
HEART RATE: 99 BPM | WEIGHT: 252.9 LBS | OXYGEN SATURATION: 97 % | SYSTOLIC BLOOD PRESSURE: 123 MMHG | DIASTOLIC BLOOD PRESSURE: 79 MMHG | TEMPERATURE: 97 F | BODY MASS INDEX: 42.08 KG/M2 | RESPIRATION RATE: 18 BRPM

## 2025-02-13 DIAGNOSIS — G62.0 CHEMOTHERAPY-INDUCED NEUROPATHY (MULTI): ICD-10-CM

## 2025-02-13 DIAGNOSIS — Z08 ENCOUNTER FOR FOLLOW-UP SURVEILLANCE OF OVARIAN CANCER: ICD-10-CM

## 2025-02-13 DIAGNOSIS — T45.1X5A CHEMOTHERAPY-INDUCED NEUROPATHY (MULTI): ICD-10-CM

## 2025-02-13 DIAGNOSIS — Z85.43 ENCOUNTER FOR FOLLOW-UP SURVEILLANCE OF OVARIAN CANCER: ICD-10-CM

## 2025-02-13 DIAGNOSIS — C56.9 MALIGNANT NEOPLASM OF OVARY, UNSPECIFIED LATERALITY (MULTI): Primary | ICD-10-CM

## 2025-02-13 PROCEDURE — 99214 OFFICE O/P EST MOD 30 MIN: CPT | Performed by: STUDENT IN AN ORGANIZED HEALTH CARE EDUCATION/TRAINING PROGRAM

## 2025-02-13 PROCEDURE — 1126F AMNT PAIN NOTED NONE PRSNT: CPT | Performed by: STUDENT IN AN ORGANIZED HEALTH CARE EDUCATION/TRAINING PROGRAM

## 2025-02-13 PROCEDURE — 1159F MED LIST DOCD IN RCRD: CPT | Performed by: STUDENT IN AN ORGANIZED HEALTH CARE EDUCATION/TRAINING PROGRAM

## 2025-02-13 PROCEDURE — 1160F RVW MEDS BY RX/DR IN RCRD: CPT | Performed by: STUDENT IN AN ORGANIZED HEALTH CARE EDUCATION/TRAINING PROGRAM

## 2025-02-13 PROCEDURE — 1123F ACP DISCUSS/DSCN MKR DOCD: CPT | Performed by: STUDENT IN AN ORGANIZED HEALTH CARE EDUCATION/TRAINING PROGRAM

## 2025-02-13 PROCEDURE — 3074F SYST BP LT 130 MM HG: CPT | Performed by: STUDENT IN AN ORGANIZED HEALTH CARE EDUCATION/TRAINING PROGRAM

## 2025-02-13 PROCEDURE — G2211 COMPLEX E/M VISIT ADD ON: HCPCS | Performed by: STUDENT IN AN ORGANIZED HEALTH CARE EDUCATION/TRAINING PROGRAM

## 2025-02-13 PROCEDURE — 3078F DIAST BP <80 MM HG: CPT | Performed by: STUDENT IN AN ORGANIZED HEALTH CARE EDUCATION/TRAINING PROGRAM

## 2025-02-13 ASSESSMENT — ENCOUNTER SYMPTOMS
OCCASIONAL FEELINGS OF UNSTEADINESS: 0
DEPRESSION: 0
LOSS OF SENSATION IN FEET: 0

## 2025-02-13 ASSESSMENT — PAIN SCALES - GENERAL: PAINLEVEL_OUTOF10: 0-NO PAIN

## 2025-02-19 ENCOUNTER — APPOINTMENT (OUTPATIENT)
Dept: INTEGRATIVE MEDICINE | Facility: CLINIC | Age: 65
End: 2025-02-19
Payer: COMMERCIAL

## 2025-02-19 DIAGNOSIS — G62.0 CHEMOTHERAPY-INDUCED NEUROPATHY (MULTI): Primary | ICD-10-CM

## 2025-02-19 DIAGNOSIS — T45.1X5A CHEMOTHERAPY-INDUCED NEUROPATHY (MULTI): Primary | ICD-10-CM

## 2025-02-19 NOTE — PROGRESS NOTES
"Acupuncture Visit:     Subjective   Patient ID: Sharmila Huerta is a 64 y.o. female who presents for No chief complaint on file.  Still achy in fingers and hands  Righty pinky pain persists  Feels cold in general  Scrambler still scheduled 3/4      Fingers achy  Has right pinkey pain  Saw PCP- discussed arthtitis pain  Feet- achy also   Wakes from discomfort in whole body  Scrambler on 3/4      Feeling ok  HA to start the day  5/10 pain in feet, feel \"webbed\"  Sleep- has had more trouble falling asleep recently        Feeling good  No extreme pain, less achy  4/10 in feet  Sleep- good overall        Pain is less today  3-4/10 in feet and toes only  Hands 4/10 not too bad  Sleep, no issues.         More pain again this week  8/10  Still just in feet, no radiation up legs.   Hands are a little worse, nut not as bad.   Sleep good.      Had improved pain last week  More pain this week, has been more busy and has more stressors  5/10 today  Feet  are the worst. Occ hands.       previous  Less pain the whole week  5/10 today  Shoulder, 3/10        Not much change after last tx  More neuropathy yesterday,  Hands are generally OK  Shoulder pain, occ hips  Can have knee pain.   Feet- 9/10 today  Shoulder 8/10  Feels she can walk up stairs easier than in past    Previous  Pt reported that her CIPN is active and joint aches.  CIPN in feet presents largely in toes described as intermittently painful with pressure, worsens with cold, when cold toes ache and throb.  CIPN in hands is less and more manageable.  Both sites are numb and tingling.  Joint pain is also achy that is present right shoulder with difficulty and increased pain with movement.  She also has right sided forearm pain which is on and off.                    Review of Systems         Provider reviewed plan for the acupuncture session, precautions and contraindications. Patient/guardian/hospital staff has given consent to treat with full understanding of what to " expect during the session. Before acupuncture began, provider explained to the patient to communicate at any time if the procedure was causing discomfort past their tolerance level. Patient agreed to advise acupuncturist. The acupuncturist counseled the patient on the risks of acupuncture treatment including pain, infection, bleeding, and no relief of pain. The patient was positioned comfortably. There was no evidence of infection at the site of needle insertions.    Objective   Physical Exam       Acupuncture Treatment  Needle Guage: 40 guage /.16/ Red irin  Body Points - Bilateral: LI15, Li4, Si3, SP6, KI6, Ba yandel  Body Points - Right: SJ14  Other Techniques Utilized: TDP Lamp  TDP Lamp Descripton: feet  Needle Count In: 17  Needle Count Out: 17  Needle Retention Time (min): 25 minutes  Total Face to Face Time (min): 25 minutes                      Assessment/Plan   Diagnoses and all orders for this visit:  Chemotherapy-induced neuropathy (Multi) (Primary)

## 2025-03-03 ENCOUNTER — APPOINTMENT (OUTPATIENT)
Dept: PAIN MEDICINE | Facility: CLINIC | Age: 65
End: 2025-03-03
Payer: COMMERCIAL

## 2025-03-04 ENCOUNTER — APPOINTMENT (OUTPATIENT)
Dept: PAIN MEDICINE | Facility: CLINIC | Age: 65
End: 2025-03-04
Payer: COMMERCIAL

## 2025-03-05 ENCOUNTER — APPOINTMENT (OUTPATIENT)
Dept: PAIN MEDICINE | Facility: CLINIC | Age: 65
End: 2025-03-05
Payer: COMMERCIAL

## 2025-03-06 ENCOUNTER — APPOINTMENT (OUTPATIENT)
Dept: PAIN MEDICINE | Facility: CLINIC | Age: 65
End: 2025-03-06
Payer: COMMERCIAL

## 2025-03-10 ENCOUNTER — APPOINTMENT (OUTPATIENT)
Dept: PAIN MEDICINE | Facility: CLINIC | Age: 65
End: 2025-03-10
Payer: COMMERCIAL

## 2025-03-11 ENCOUNTER — APPOINTMENT (OUTPATIENT)
Dept: PAIN MEDICINE | Facility: CLINIC | Age: 65
End: 2025-03-11
Payer: COMMERCIAL

## 2025-03-12 ENCOUNTER — APPOINTMENT (OUTPATIENT)
Dept: PAIN MEDICINE | Facility: CLINIC | Age: 65
End: 2025-03-12
Payer: COMMERCIAL

## 2025-03-13 ENCOUNTER — APPOINTMENT (OUTPATIENT)
Dept: PAIN MEDICINE | Facility: CLINIC | Age: 65
End: 2025-03-13
Payer: COMMERCIAL

## 2025-03-14 ENCOUNTER — DOCUMENTATION (OUTPATIENT)
Dept: SPEECH THERAPY | Facility: CLINIC | Age: 65
End: 2025-03-14
Payer: COMMERCIAL

## 2025-03-14 NOTE — PROGRESS NOTES
Speech-Language Pathology    Discharge Summary    Name: Sharmila Huerta  MRN: [id number]  : 1960  Date: 25    Discharge Summary: SLP    Discharge Information: Date of discharge 3/14/2025, Date of last visit 4/3/2025, Date of evaluation 2024, Number of attended visits 5, Referred by Dr. Debra Parker, and Referred for cognitive communication changes s/p chemotherapy    Discharge Summary: Patient was seen for a total of 6 visits, x5 for treatment. Therapy addressed improved accuracy and efficiency of word recall, compensatory strategy training for impaired word recall, and compensatory strategy training for memory/attention.    Rehab Discharge Reason: Patient was a no show/no call for her remaining treatment visit ahead of discharge.

## 2025-03-17 ENCOUNTER — TELEPHONE (OUTPATIENT)
Dept: PRIMARY CARE | Facility: CLINIC | Age: 65
End: 2025-03-17
Payer: COMMERCIAL

## 2025-03-17 ENCOUNTER — APPOINTMENT (OUTPATIENT)
Dept: PRIMARY CARE | Facility: CLINIC | Age: 65
End: 2025-03-17
Payer: COMMERCIAL

## 2025-03-17 VITALS
WEIGHT: 250 LBS | HEART RATE: 90 BPM | BODY MASS INDEX: 41.65 KG/M2 | DIASTOLIC BLOOD PRESSURE: 70 MMHG | SYSTOLIC BLOOD PRESSURE: 120 MMHG | HEIGHT: 65 IN | OXYGEN SATURATION: 97 %

## 2025-03-17 DIAGNOSIS — Z13.29 SCREENING FOR ENDOCRINE, METABOLIC AND IMMUNITY DISORDER: ICD-10-CM

## 2025-03-17 DIAGNOSIS — Z13.0 SCREENING FOR ENDOCRINE, METABOLIC AND IMMUNITY DISORDER: ICD-10-CM

## 2025-03-17 DIAGNOSIS — E11.9 DIABETES MELLITUS WITHOUT COMPLICATION (MULTI): Primary | ICD-10-CM

## 2025-03-17 DIAGNOSIS — Z13.228 SCREENING FOR ENDOCRINE, METABOLIC AND IMMUNITY DISORDER: ICD-10-CM

## 2025-03-17 DIAGNOSIS — C80.0 CARCINOMATOSIS (MULTI): ICD-10-CM

## 2025-03-17 DIAGNOSIS — F17.210 CIGARETTE NICOTINE DEPENDENCE WITHOUT COMPLICATION: ICD-10-CM

## 2025-03-17 PROCEDURE — 3008F BODY MASS INDEX DOCD: CPT | Performed by: INTERNAL MEDICINE

## 2025-03-17 PROCEDURE — 99214 OFFICE O/P EST MOD 30 MIN: CPT | Performed by: INTERNAL MEDICINE

## 2025-03-17 PROCEDURE — 1123F ACP DISCUSS/DSCN MKR DOCD: CPT | Performed by: INTERNAL MEDICINE

## 2025-03-17 PROCEDURE — 3074F SYST BP LT 130 MM HG: CPT | Performed by: INTERNAL MEDICINE

## 2025-03-17 PROCEDURE — 3078F DIAST BP <80 MM HG: CPT | Performed by: INTERNAL MEDICINE

## 2025-03-17 ASSESSMENT — LIFESTYLE VARIABLES: HOW OFTEN DO YOU HAVE A DRINK CONTAINING ALCOHOL: MONTHLY OR LESS

## 2025-03-17 NOTE — PATIENT INSTRUCTIONS
Really need to start on metformin 500 mg one a day and continue to walk and do your diet changes    Please call our  [phone] to assist with scheduling   Tobacco cessation counseling

## 2025-03-17 NOTE — PROGRESS NOTES
Subjective   Patient ID: Sharmila Huerta is a 64 y.o. female who presents for Follow-up.    HPI  Patient in for a visit  Still smoking , she switched brands 1/2 ppd   Main drink is coffee, cut back on potatoes , bread only 1 slice no jelly   Walking up and down the stairs   Her hba1c is up but she did not take the metformin   Review of Systems  General: Denies fever, chills, night sweats,  Eyes: Negative for recent visual changes  Ears, Nose, Throat :  Negative for hearing changes, sinus discomfort  Dermatologic: Negative for new skin conditions, rash  Respiratory: Negative for wheezing, shortness of breath, cough  Cardiovascular: Negative for chest pain, palpitations, or leg swelling  Gastrointestinal: Negative for nausea/vomiting, abdominal pain, changes in bowel habits  Genitourinary Negative for Urinary Incontinence  urgency , frequency, discomfort   Musculoskeletal: see hpi  Neurological: Negative for headaches, dizziness    Previous history  Past Medical History:   Diagnosis Date    Malignant neoplasm of unspecified ovary (Multi) 06/27/2022    Malignant neoplasm of ovary, unspecified laterality     Past Surgical History:   Procedure Laterality Date    OTHER SURGICAL HISTORY  06/20/2022    Hysterectomy abdominal,    US GUIDED ABDOMINAL PARACENTESIS  11/08/2021    US GUIDED ABDOMINAL PARACENTESIS 11/8/2021 [facility] ANCILLARY LEGACY    US GUIDED ABDOMINAL PARACENTESIS  11/30/2021    US GUIDED ABDOMINAL PARACENTESIS 11/30/2021 [facility] ANCILLARY LEGACY     Social History     Tobacco Use    Smoking status: Every Day     Types: Cigarettes     Passive exposure: Past    Smokeless tobacco: Never   Vaping Use    Vaping status: Never Used   Substance Use Topics    Alcohol use: Not Currently    Drug use: Not Currently     Family History   Problem Relation Name Age of Onset    Ovarian cancer Sister      Lung cancer Brother      No Known Problems Daughter Balta     No Known Problems Daughter Domonique     No Known Problems Daughter  Daily     No Known Problems Other Child      Allergies   Allergen Reactions    Penicillins Unknown    Capital With Codeine [Acetaminophen-Codeine] Palpitations     Current Outpatient Medications   Medication Instructions    acetaminophen (TYLENOL) 1,000 mg, oral, Every 8 hours PRN    albuterol (Ventolin HFA) 90 mcg/actuation inhaler 2 puffs, inhalation, Every 8 hours PRN    DULoxetine (CYMBALTA) 30 mg, oral, Nightly, In addition to 60mg capsule for total of 90mg total    DULoxetine (CYMBALTA) 60 mg, oral, Nightly, In addition to 30mg capsule for total of 90mg    famotidine (PEPCID) 20 mg, oral, Nightly    fluticasone (Flonase) 50 mcg/actuation nasal spray 2 sprays, Each Nostril, Daily    ibuprofen (IBU) 600 mg, Every 6 hours PRN    magnesium oxide (Mag-Ox) 400 mg (241.3 mg magnesium) tablet 2 tablets, 2 times daily    metFORMIN (GLUCOPHAGE) 500 mg, oral, Daily with breakfast    polyethylene glycol (GAVILAX) 17 g, Daily PRN    pregabalin (Lyrica) 50 mg capsule Take 2 capsules (100 mg) by mouth 2 times a day for 7 days, THEN 1 capsule (50 mg) 2 times a day for 7 days. Then stop. Need to stop 72 hours prior to starting Scrambler Therapy..    pregabalin (LYRICA) 200 mg, oral, 2 times daily    tiotropium (Spiriva Respimat) 2.5 mcg/actuation inhaler 2 puffs, Daily    topiramate (TOPAMAX) 100 mg, oral, 2 times daily    zinc sulfate (Zincate) 220 (50 Zn) MG capsule 50 mg of elemental zinc, oral, Daily,  for change in taste       Objective       Physical Exam  Vital Signs: as recorded above  General: Well groomed, well nourished   Orientation:  Alert , oriented to time, place , and person   Mood and Affect:  Cooperative , no apparent distress normal affect  Skin: Good color, good turgor  Eyes: Extra ocular muscle movements intact, anicteric sclerae  Neck: Supple, full range of movement  Chest: Normal breath sounds, normal chest wall exam, symmetric, good air entry, clear to auscultation  Heart: Regular rate and rhythm,  without murmur, gallop, or rubs  BACK:  no CTLS spine tenderness, no flank tenderness  Extremities: full range of movement  bilateral UE and bilateral LE,  no lower extremity edema  Neurological: Alert, oriented, cranial nerves II-XII grossly intact except for visual acuity  Sensation:  Intact   Gait: normal steady      Assessment/Plan   Sharmila Huerta is a 64 y.o. female who presents for the concerns below:    Problem List Items Addressed This Visit    None    DIABETES MELLITUS PLAN:Stable with current medication regimen  Follow Diabetic diet, will consult nutrition if needed,  exercise as discussed, weight control., before our next visit will obtain HbA1c, BMP, urine microalbumin, ophthalmology exam.  Need Podiatry checks periodically.    TOBACCO USE DISORDER PLAN:  Counseled on strategies to help quit smoking.  Do not buy favorite cigarettes .   Explained risks of continuing.  Recommended medication and suggested over the counter patches. Gum, lozenges that may help patient quit.    Awv in June   Her cousin goes to the pain med 2 days a week , looking for transportation   Neuropathy not on the pregabalin   CARCINOMATOSIS getting palliative care, acupuncture   Insurance will cover UBER up to 50 miles will stay with her cousin and it is less than 50 miles        Discussed with:   Return in : 3 months start  the metformin     Portions of this note were generated using digital voice recognition software, and may contain grammatical errors       Riana Dietrich MD  03/17/25  12:14 PM

## 2025-03-17 NOTE — TELEPHONE ENCOUNTER
Spoke with pt , the visit diagnosis needs to be changed since the conversion to layman's explanation does not account for the treatment - debulking, chemo that has been done already  So will switch to hx of carcinomatosis .  AQ

## 2025-03-17 NOTE — PROGRESS NOTES
SUPPORTIVE AND PALLIATIVE ONCOLOGY OUTPATIENT FOLLOW-UP    Virtual or Telephone Consent    A telephone visit (audio only) between the patient (at the originating site) and the provider (at the distant site) was utilized to provide this telehealth service.   Verbal consent was requested and obtained from Sharmila Huerta on this date, 3/20/2025 for a telehealth visit.     SERVICE DATE: 3/20/2025    Subjective   HISTORY OF PRESENT ILLNESS: Sharmila Huerta is a 64 y.o. 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 She follows with Dr. Parker as her primary oncologist, currently pursuing maintenance therapy with Olaparib.  She has been referred to Supportive Oncology/Palliative Care for management of neuropathy, neoplasm related pain, loss of appetite, and fatigue.      Pain Assessment:  Pain Score:  0  Location:    Description:      Symptom Assessment:  Pain:a little - generalized arthralgias, continues Tylenol and Ibuprofen as needed  Numbness or Tingling in hands/feet/other: somewhat - has weaned off of pregabalin in anticipation for Scrambler Therapy but ultimately did not got to Scrambler because she could not organize rides. She is hopeful to get that sorted out and be able to reschedule. She continues to go to acupuncture which he feels has helped some, is taking Topiramate and Duloxetine. Most days neuropathy is tolerable, but does note some intermittent pinging/jolting that can be more painful  Sore Muscles/Spasms: none  Headache: none  Dizziness:none  Constipation: none  Diarrhea: none  Nausea: none  Vomiting: none  Lack of Appetite: none   Weight Loss: none  Taste changes: none  Dry Mouth: none  Pain in Mouth/Swallowing: none  Lack of Energy: a little - continues with fatigue, but reports she has been trying to walk more and be more active recently to help with her energy  Difficulty Sleeping: none  Worrying: none  Anxiety: none  Depression: none  Shortness of  "breath: none  Other: none      Information obtained from: chart review, interview of patient, and interview of family  ______________________________________________________________________        Objective       PHYSICAL EXAMINATION   Vital Signs:   Vital signs reviewed      7/30/2024    12:08 PM 10/3/2024    12:06 PM 10/28/2024     1:02 PM 10/31/2024     3:32 PM 12/4/2024     1:55 PM 2/13/2025     3:50 PM 3/17/2025    11:47 AM   Vitals   Systolic  113 113 121  123 120   Diastolic  72 73 74  79 70   BP Location  Left arm    Left arm    Heart Rate   94 106 105 99 90   Temp    36.4 °C (97.5 °F) 36.2 °C (97.2 °F) 36.1 °C (97 °F)    Resp   16 18 18 18    Height 1.661 m (5' 5.39\")  1.651 m (5' 5\")    1.651 m (5' 5\")   Weight (lb) 249.12 245 245 245.7 242.29 252.9 250   BMI 40.96 kg/m2 40.28 kg/m2 40.77 kg/m2 40.89 kg/m2 40.32 kg/m2 42.08 kg/m2 41.6 kg/m2   BSA (m2) 2.28 m2 2.26 m2 2.26 m2 2.26 m2 2.25 m2 2.3 m2 2.28 m2   Visit Report  Report Report Report Report Report Report       Physical Exam  Not complete dt virtual visit    ASSESSMENT/PLAN    Arthralgia Pain/CIPN  - Restart Pregabalin 50mg BID - discussed if she plan to reschedule Scrambler Therapy, will need to wean back off 72 hours prior  - Continue Duloxetine 90mg once daily at bed  - Continue Topiramate 100mg BID  - Continue Acetaminophen 500mg-1000 [id number] PRN for pain  - Continue Ibuprofen 600mg [id number] PRN  - Referral to [facility] - Dr Tan (established 6/26/2024) - started acupuncture therapy  - Scheduled for Scrambler Therapy starting 3/3/2025 - patient canceled treatments due to transportation issues. Planning to reschedule      Constipation/Diarrhea  - Continue Docusate 100mg BID PRN for constipation  - Continue Miralax 17grams 1-2x per day PRN for constipation  - Discussed using Loperamide 2mg PRN for diarrhea - MAX 8 caps/24 hours  - Continue Probiotic Drink     Nausea/GERD  - Continue Famotidine 20mg at bed  - Continue Ondansetron 8mg " [id number] PRN  - Continue Prochlorperazine 10mg [id number]     SOB/Cough  - Continue Albuterol 90mcg/inh; 2 puffs [id number] PRN for SOB  - Continue Benzonatate 200mg TID PRN for cough     Depression/Insomnia  - Referrals sent to Music Therapy and Spiritual Therapy  - Email sent to  for assistance with LW/POA and other home needs  - Referral sent to Smoking Cessation Program - never scheduled  - Registered at [facility] - encouraged to attend for counseling services and assistance with grief coping in loss of her brother  - Continue Duloxetine 90mg at bed   - Trazodone - no longer needed     Fatigue  - Continue to monitor  - Encouraged daily physical activity as tolerated    Next Follow-Up Visit:  Return to clinic 3 months in person with Linda Ambriz CNP at [facility]    Signature and billing  Medical complexity was moderate level due to due to complexity of problems, extensive data review, and high risk of management/treatment.  Time was spent on the following: Prep Time, Time Directly with Patient/Family/Caregiver, Documentation Time. Total time spent: 30      Data  Diagnostic tests and information reviewed for today's visit:  Most recent labs and imaging results, Medications     Some elements copied from Palliative Care note on 2/4/2025, the elements have been updated and all reflect current decision making from today, 3/20/2025.    Plan of Care discussed with: Patient and Family    SIGNATURE: EBONI Syed-CNP    Contact information:  Supportive and Palliative Oncology  Monday-Friday 8 AM-5 PM  Phone:  [phone], press option #5, then option #1.   Or Epic Secure Chat

## 2025-03-18 ENCOUNTER — APPOINTMENT (OUTPATIENT)
Dept: INTEGRATIVE MEDICINE | Facility: CLINIC | Age: 65
End: 2025-03-18
Payer: COMMERCIAL

## 2025-03-20 ENCOUNTER — TELEMEDICINE (OUTPATIENT)
Dept: PALLIATIVE MEDICINE | Facility: HOSPITAL | Age: 65
End: 2025-03-20
Payer: COMMERCIAL

## 2025-03-20 DIAGNOSIS — C56.9 MALIGNANT NEOPLASM OF OVARY, UNSPECIFIED LATERALITY (MULTI): ICD-10-CM

## 2025-03-20 DIAGNOSIS — F32.A DEPRESSION, UNSPECIFIED DEPRESSION TYPE: ICD-10-CM

## 2025-03-20 DIAGNOSIS — R53.83 FATIGUE, UNSPECIFIED TYPE: ICD-10-CM

## 2025-03-20 DIAGNOSIS — K21.9 GASTROESOPHAGEAL REFLUX DISEASE WITHOUT ESOPHAGITIS: ICD-10-CM

## 2025-03-20 DIAGNOSIS — R05.3 CHRONIC COUGH: ICD-10-CM

## 2025-03-20 DIAGNOSIS — R11.0 NAUSEA: ICD-10-CM

## 2025-03-20 DIAGNOSIS — Z51.5 PALLIATIVE CARE ENCOUNTER: Primary | ICD-10-CM

## 2025-03-20 DIAGNOSIS — M25.50 GENERALIZED JOINT PAIN: ICD-10-CM

## 2025-03-20 DIAGNOSIS — M25.50 ARTHRALGIA, UNSPECIFIED JOINT: ICD-10-CM

## 2025-03-20 DIAGNOSIS — T45.1X5A CHEMOTHERAPY-INDUCED PERIPHERAL NEUROPATHY (MULTI): ICD-10-CM

## 2025-03-20 DIAGNOSIS — G62.0 CHEMOTHERAPY-INDUCED PERIPHERAL NEUROPATHY (MULTI): ICD-10-CM

## 2025-03-20 PROCEDURE — 99214 OFFICE O/P EST MOD 30 MIN: CPT | Performed by: NURSE PRACTITIONER

## 2025-03-20 PROCEDURE — 1123F ACP DISCUSS/DSCN MKR DOCD: CPT | Performed by: NURSE PRACTITIONER

## 2025-03-20 RX ORDER — PREGABALIN 50 MG/1
50 CAPSULE ORAL 2 TIMES DAILY
Qty: 60 CAPSULE | Refills: 2 | Status: SHIPPED | OUTPATIENT
Start: 2025-03-20 | End: 2025-06-18

## 2025-04-04 ENCOUNTER — TELEPHONE (OUTPATIENT)
Dept: GYNECOLOGIC ONCOLOGY | Facility: HOSPITAL | Age: 65
End: 2025-04-04
Payer: COMMERCIAL

## 2025-04-04 NOTE — TELEPHONE ENCOUNTER
Patients daughters intermittent FMLA paperwork completed and faxed to [facility] at

## 2025-04-08 ENCOUNTER — APPOINTMENT (OUTPATIENT)
Dept: INTEGRATIVE MEDICINE | Facility: CLINIC | Age: 65
End: 2025-04-08
Payer: COMMERCIAL

## 2025-04-08 DIAGNOSIS — T45.1X5A CHEMOTHERAPY-INDUCED NEUROPATHY (MULTI): Primary | ICD-10-CM

## 2025-04-08 DIAGNOSIS — G62.0 CHEMOTHERAPY-INDUCED NEUROPATHY (MULTI): Primary | ICD-10-CM

## 2025-04-08 PROCEDURE — G2211 COMPLEX E/M VISIT ADD ON: HCPCS | Performed by: HOSPITALIST

## 2025-04-08 NOTE — PROGRESS NOTES
"Acupuncture Visit:     Subjective   Patient ID: Sharmila Huerta is a 64 y.o. female who presents for No chief complaint on file.  Had change in medications  Pain is higher some days, worse in feet  Occ hands will flare.   Right shoulder pain x 2 weeks, feel it is from how she is sleeping.         No change in hands  Pinkey and big toes are still the worse  Tips of fingers        Still achy in fingers and hands  Righty pinky pain persists  Feels cold in general  Scrambler still scheduled 3/4      Fingers achy  Has right pinkey pain  Saw PCP- discussed arthtitis pain  Feet- achy also   Wakes from discomfort in whole body  Scrambler on 3/4      Feeling ok  HA to start the day  5/10 pain in feet, feel \"webbed\"  Sleep- has had more trouble falling asleep recently        Feeling good  No extreme pain, less achy  4/10 in feet  Sleep- good overall        Pain is less today  3-4/10 in feet and toes only  Hands 4/10 not too bad  Sleep, no issues.         More pain again this week  8/10  Still just in feet, no radiation up legs.   Hands are a little worse, nut not as bad.   Sleep good.      Had improved pain last week  More pain this week, has been more busy and has more stressors  5/10 today  Feet  are the worst. Occ hands.       previous  Less pain the whole week  5/10 today  Shoulder, 3/10        Not much change after last tx  More neuropathy yesterday,  Hands are generally OK  Shoulder pain, occ hips  Can have knee pain.   Feet- 9/10 today  Shoulder 8/10  Feels she can walk up stairs easier than in past    Previous  Pt reported that her CIPN is active and joint aches.  CIPN in feet presents largely in toes described as intermittently painful with pressure, worsens with cold, when cold toes ache and throb.  CIPN in hands is less and more manageable.  Both sites are numb and tingling.  Joint pain is also achy that is present right shoulder with difficulty and increased pain with movement.  She also has right sided forearm " pain which is on and off.                    Review of Systems         Provider reviewed plan for the acupuncture session, precautions and contraindications. Patient/guardian/hospital staff has given consent to treat with full understanding of what to expect during the session. Before acupuncture began, provider explained to the patient to communicate at any time if the procedure was causing discomfort past their tolerance level. Patient agreed to advise acupuncturist. The acupuncturist counseled the patient on the risks of acupuncture treatment including pain, infection, bleeding, and no relief of pain. The patient was positioned comfortably. There was no evidence of infection at the site of needle insertions.    Objective   Physical Exam       Acupuncture Treatment  Needle Guage: 40 guage /.16/ Red seirin  Body Points - Bilateral: LI15, Li4, Si3, SP6, KI6, Ba yandel  Body Points - Right: SJ14  Other Techniques Utilized: TDP Lamp  TDP Lamp Descripton: feet  Needle Count In: 17  Needle Count Out: 17  Needle Retention Time (min): 25 minutes  Total Face to Face Time (min): 25 minutes                      Assessment/Plan   Diagnoses and all orders for this visit:  Chemotherapy-induced neuropathy (Multi) (Primary)

## 2025-04-15 ENCOUNTER — APPOINTMENT (OUTPATIENT)
Dept: INTEGRATIVE MEDICINE | Facility: CLINIC | Age: 65
End: 2025-04-15
Payer: COMMERCIAL

## 2025-04-15 DIAGNOSIS — G62.0 CHEMOTHERAPY-INDUCED NEUROPATHY (MULTI): Primary | ICD-10-CM

## 2025-04-15 DIAGNOSIS — T45.1X5A CHEMOTHERAPY-INDUCED NEUROPATHY (MULTI): Primary | ICD-10-CM

## 2025-04-15 PROCEDURE — G2211 COMPLEX E/M VISIT ADD ON: HCPCS | Performed by: HOSPITALIST

## 2025-04-15 NOTE — PROGRESS NOTES
"Acupuncture Visit:     Subjective   Patient ID: Sharmila Huerta is a 64 y.o. female who presents for No chief complaint on file.  More fatigue yesterday  Pain is similar- some short term relief after last  Shoulder still painful, using tiger balm.       Had change in medications  Pain is higher some days, owrse in feet  Occ hands will flare.   Right shoulder pain x 2 weeks, feel it is from how she is sleeping.         No change in hands  Pinkey and big toes are still the worse  Tips of fingers        Still achy in fingers and hands  Righty pinky pain persists  Feels cold in general  Scrambler still scheduled 3/4      Fingers achy  Has right pinkey pain  Saw PCP- discussed arthtitis pain  Feet- achy also   Wakes from discomfort in whole body  Scrambler on 3/4      Feeling ok  HA to start the day  5/10 pain in feet, feel \"webbed\"  Sleep- has had more trouble falling asleep recently        Feeling good  No extreme pain, less achy  4/10 in feet  Sleep- good overall        Pain is less today  3-4/10 in feet and toes only  Hands 4/10 not too bad  Sleep, no issues.         More pain again this week  8/10  Still just in feet, no radiation up legs.   Hands are a little worse, nut not as bad.   Sleep good.      Had improved pain last week  More pain this week, has been more busy and has more stressors  5/10 today  Feet  are the worst. Occ hands.       previous  Less pain the whole week  5/10 today  Shoulder, 3/10        Not much change after last tx  More neuropathy yesterday,  Hands are generally OK  Shoulder pain, occ hips  Can have knee pain.   Feet- 9/10 today  Shoulder 8/10  Feels she can walk up stairs easier than in past    Previous  Pt reported that her CIPN is active and joint aches.  CIPN in feet presents largely in toes described as intermittently painful with pressure, worsens with cold, when cold toes ache and throb.  CIPN in hands is less and more manageable.  Both sites are numb and tingling.  Joint pain is " also achy that is present right shoulder with difficulty and increased pain with movement.  She also has right sided forearm pain which is on and off.                  Review of Systems         Provider reviewed plan for the acupuncture session, precautions and contraindications. Patient/guardian/hospital staff has given consent to treat with full understanding of what to expect during the session. Before acupuncture began, provider explained to the patient to communicate at any time if the procedure was causing discomfort past their tolerance level. Patient agreed to advise acupuncturist. The acupuncturist counseled the patient on the risks of acupuncture treatment including pain, infection, bleeding, and no relief of pain. The patient was positioned comfortably. There was no evidence of infection at the site of needle insertions.    Objective   Physical Exam       Acupuncture Treatment  Needle Guage: 40 guage /.16/ Red seirin  Body Points - Bilateral: LI15, Li4, Si3, SP6, KI6, Ba yandel  Body Points - Right: SJ14  Other Techniques Utilized: TDP Lamp  TDP Lamp Descripton: feet  Needle Count In: 17  Needle Count Out: 17  Needle Retention Time (min): 25 minutes  Total Face to Face Time (min): 25 minutes                      Assessment/Plan   There are no diagnoses linked to this encounter.

## 2025-04-21 ENCOUNTER — APPOINTMENT (OUTPATIENT)
Dept: INTEGRATIVE MEDICINE | Facility: CLINIC | Age: 65
End: 2025-04-21
Payer: COMMERCIAL

## 2025-04-29 ENCOUNTER — APPOINTMENT (OUTPATIENT)
Dept: INTEGRATIVE MEDICINE | Facility: CLINIC | Age: 65
End: 2025-04-29
Payer: COMMERCIAL

## 2025-04-29 DIAGNOSIS — G62.0 CHEMOTHERAPY-INDUCED NEUROPATHY (MULTI): Primary | ICD-10-CM

## 2025-04-29 DIAGNOSIS — T45.1X5A CHEMOTHERAPY-INDUCED NEUROPATHY (MULTI): Primary | ICD-10-CM

## 2025-04-29 PROCEDURE — G2211 COMPLEX E/M VISIT ADD ON: HCPCS | Performed by: HOSPITALIST

## 2025-04-29 NOTE — PROGRESS NOTES
"Acupuncture Visit:     Subjective   Patient ID: Sharmila Huerta is a 64 y.o. female who presents for No chief complaint on file.  mother in law passed yesterday    Has lost her medication, can't find it in her house  Pain similar in feet, worse since off meds  Was on feet for a day around [facility].       Previous  More fatigue yesterday  Pain is similar- some short term relief after last  Shoulder still painful, using tiger balm.       Had change in medications  Pain is higher some days, owrse in feet  Occ hands will flare.   Right shoulder pain x 2 weeks, feel it is from how she is sleeping.         No change in hands  Pinkey and big toes are still the worse  Tips of fingers        Still achy in fingers and hands  Righty pinky pain persists  Feels cold in general  Scrambler still scheduled 3/4      Fingers achy  Has right pinkey pain  Saw PCP- discussed arthtitis pain  Feet- achy also   Wakes from discomfort in whole body  Scrambler on 3/4      Feeling ok  HA to start the day  5/10 pain in feet, feel \"webbed\"  Sleep- has had more trouble falling asleep recently        Feeling good  No extreme pain, less achy  4/10 in feet  Sleep- good overall        Pain is less today  3-4/10 in feet and toes only  Hands 4/10 not too bad  Sleep, no issues.         More pain again this week  8/10  Still just in feet, no radiation up legs.   Hands are a little worse, nut not as bad.   Sleep good.      Had improved pain last week  More pain this week, has been more busy and has more stressors  5/10 today  Feet  are the worst. Occ hands.       previous  Less pain the whole week  5/10 today  Shoulder, 3/10        Not much change after last tx  More neuropathy yesterday,  Hands are generally OK  Shoulder pain, occ hips  Can have knee pain.   Feet- 9/10 today  Shoulder 8/10  Feels she can walk up stairs easier than in past    Previous  Pt reported that her CIPN is active and joint aches.  CIPN in feet presents largely in toes described as " intermittently painful with pressure, worsens with cold, when cold toes ache and throb.  CIPN in hands is less and more manageable.  Both sites are numb and tingling.  Joint pain is also achy that is present right shoulder with difficulty and increased pain with movement.  She also has right sided forearm pain which is on and off.                    Review of Systems         Provider reviewed plan for the acupuncture session, precautions and contraindications. Patient/guardian/hospital staff has given consent to treat with full understanding of what to expect during the session. Before acupuncture began, provider explained to the patient to communicate at any time if the procedure was causing discomfort past their tolerance level. Patient agreed to advise acupuncturist. The acupuncturist counseled the patient on the risks of acupuncture treatment including pain, infection, bleeding, and no relief of pain. The patient was positioned comfortably. There was no evidence of infection at the site of needle insertions.    Objective   Physical Exam       Acupuncture Treatment  Needle Guage: 40 guage /.16/ Red seirin  Body Points - Bilateral: LI15, Li4, Si3, SP6, KI6, Ba yandel  Body Points - Right: SJ14  Other Techniques Utilized: TDP Lamp  TDP Lamp Descripton: feet  Needle Count In: 17  Needle Count Out: 17  Needle Retention Time (min): 25 minutes  Total Face to Face Time (min): 25 minutes                      Assessment/Plan   There are no diagnoses linked to this encounter.    Diagnoses and all orders for this visit:  Chemotherapy-induced neuropathy (Multi) (Primary)

## 2025-05-13 ENCOUNTER — APPOINTMENT (OUTPATIENT)
Dept: INTEGRATIVE MEDICINE | Facility: CLINIC | Age: 65
End: 2025-05-13
Payer: COMMERCIAL

## 2025-05-14 ENCOUNTER — APPOINTMENT (OUTPATIENT)
Dept: INTEGRATIVE MEDICINE | Facility: CLINIC | Age: 65
End: 2025-05-14
Payer: COMMERCIAL

## 2025-05-14 DIAGNOSIS — G62.0 CHEMOTHERAPY-INDUCED NEUROPATHY (MULTI): Primary | ICD-10-CM

## 2025-05-14 DIAGNOSIS — T45.1X5A CHEMOTHERAPY-INDUCED NEUROPATHY (MULTI): Primary | ICD-10-CM

## 2025-05-14 DIAGNOSIS — C56.9 MALIGNANT NEOPLASM OF OVARY, UNSPECIFIED LATERALITY (MULTI): ICD-10-CM

## 2025-05-14 DIAGNOSIS — Z51.5 PALLIATIVE CARE ENCOUNTER: ICD-10-CM

## 2025-05-14 PROCEDURE — 99214 OFFICE O/P EST MOD 30 MIN: CPT | Performed by: HOSPITALIST

## 2025-05-14 NOTE — PROGRESS NOTES
Patient ID: Sharmila Huerta is a 64 y.o. female.  Referring Physician: No referring provider defined for this encounter.  Primary Care Provider: Riana Dietrich MD    CANCER HISTORY:   64y.o. 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 Olaparib - off since 4/24     INTEGRATIVE HISTORY:  Symptoms:  Urinary leakage     Neuropathy - Hands/feet -  numbness/pain/tingling - long-standing              On lyrica/cymbalta, helps with hands              Getting a little better - pain  Same now     Diet: not a big eater, chicken, beans, greens     PA: was not active during chemotherapy, used to be sleepy - now more energy  Walking    Sleep: sleeping is more diff now - hard getting in a position where it doesn't hurt - bought a body pillow now     Stress: doing ok, lost brother last year - caused stress but coming to acceptance     Natural Products:    B6, B12 - not sure if helping    ROS:  no ha, visual symptoms, hearing loss  no sob, chest pain, palp  ROS o/w non contributory, please see HPI    Objective    BSA: There is no height or weight on file to calculate BSA.  LMP  (LMP Unknown)     PHYSICAL EXAM:  NAD, awake/alert  HEENT, NCAT, OP clear, no oral lesions  CTA bilat  RRR no mgr  Abd soft/nt/nd+bs  No c/c/e/ttp  Motor/sensory intact, CN 2-12 intact     RESULTS:  Lab Results   Component Value Date    WBC 5.7 07/22/2024    HGB 10.5 (L) 07/22/2024    HCT 33.3 (L) 07/22/2024     07/22/2024    CREATININE 0.96 02/11/2025    AST 11 10/30/2024     9.3 02/11/2025     8.7 10/30/2024     5.9 07/22/2024     5.2 04/23/2024     5.7 03/27/2024     5.2 03/01/2024     6.4 01/30/2024     7.7 12/26/2023     7.0 12/01/2023     5.6 10/31/2023     7.3 09/27/2023     8.6 08/25/2023     5.5  07/25/2023     6.0 06/27/2023     5.6 05/30/2023     4.1 05/02/2023     5.2 04/04/2023     3.9 03/07/2023     4.1 01/31/2023     3.9 01/10/2023     4.1 12/13/2022     3.5 11/14/2022     3.8 10/18/2022     3.3 08/24/2022     4.3 07/27/2022     4.5 06/28/2022     5.3 05/16/2022     5.5 04/25/2022     6.4 04/04/2022     10.5 03/14/2022     35.3 (H) 01/18/2022     115.5 (H) 12/27/2021     254.5 (H) 11/29/2021     235.7 (H) 11/08/2021     249.3 (H) 10/18/2021       Assessment/Plan   64 y.o. 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 Olaparib      CANCER SPECIFIC RECCS:  LIFESTYLE:  Diet - 5-9 fruits/veg/day (7 veg to 2 fruits)  Cruciferous Vegetables - Brussel Sprouts, Kale, Broccoli, Cauliflower  PHYSICAL ACTIVITY 30 MIN/DAY - try walking often  Limit sugar     Natural Products:  I would stop b12/b6 - not sure it is helping  Vitamin D3 2000 Int units/day - taking     Reading:  [employer]  Cancer Fighting Kitchen     Websites:  [employer] for your Life  [email or web address]     SYMPTOM MANAGEMENT:  Neuropathy: numb in hands and fingers, feet    Integrative Modalities to consider:  Acupuncture weekly in [facility] weekly x 6 weeks then reassess - helping a bit - still doing it    Massage/Reflexology - body pains affecting sleep now, would suggest trying    Scrambler Therapy - didn't make it to appt, doesn't have transportation     Arthritis symptoms - acupuncture  Omega 3 supplement  Turmeric ([place]oa)     Stress - brother passed 1 yr ago, dealing with it well     Follow up:  prn    IO symptom management acupuncture/massage  Scrambler Therapy

## 2025-05-19 ENCOUNTER — LAB (OUTPATIENT)
Dept: LAB | Facility: HOSPITAL | Age: 65
End: 2025-05-19
Payer: COMMERCIAL

## 2025-05-19 DIAGNOSIS — C56.9 MALIGNANT NEOPLASM OF OVARY, UNSPECIFIED LATERALITY (MULTI): ICD-10-CM

## 2025-05-19 DIAGNOSIS — C56.9 MALIGNANT NEOPLASM OF UNSPECIFIED OVARY (MULTI): Primary | ICD-10-CM

## 2025-05-19 LAB — CANCER AG125 SERPL-ACNC: 6.7 U/ML (ref 0–30.2)

## 2025-05-19 PROCEDURE — 86304 IMMUNOASSAY TUMOR CA 125: CPT

## 2025-05-19 PROCEDURE — 36415 COLL VENOUS BLD VENIPUNCTURE: CPT

## 2025-05-20 LAB
ANION GAP SERPL CALCULATED.4IONS-SCNC: 8 MMOL/L (CALC) (ref 7–17)
BUN SERPL-MCNC: 10 MG/DL (ref 7–25)
BUN/CREAT SERPL: ABNORMAL (CALC) (ref 6–22)
CALCIUM SERPL-MCNC: 9.9 MG/DL (ref 8.6–10.4)
CHLORIDE SERPL-SCNC: 103 MMOL/L (ref 98–110)
CO2 SERPL-SCNC: 27 MMOL/L (ref 20–32)
CREAT SERPL-MCNC: 0.94 MG/DL (ref 0.5–1.05)
EGFRCR SERPLBLD CKD-EPI 2021: 67 ML/MIN/1.73M2
EST. AVERAGE GLUCOSE BLD GHB EST-MCNC: 160 MG/DL
EST. AVERAGE GLUCOSE BLD GHB EST-SCNC: 8.9 MMOL/L
GLUCOSE SERPL-MCNC: 122 MG/DL (ref 65–99)
HBA1C MFR BLD: 7.2 %
POTASSIUM SERPL-SCNC: 4.4 MMOL/L (ref 3.5–5.3)
SODIUM SERPL-SCNC: 138 MMOL/L (ref 135–146)

## 2025-05-21 NOTE — PROGRESS NOTES
Patient ID: Sharmila Huerta is a 64 y.o. female.  Referring Physician: No referring provider defined for this encounter.  Primary Care Provider: Riana Dietrich MD      Subjective    Interval History:  States Duloxetine causes fatigue, otherwise going to the bathroom normally and eating and drinking normally. Notes intermittent abdominal, knee and feet pain. Denies lower extremity edema and balance issues. She is trying to quit smoking tobacco, declined Rxs. Sharmila asked if I reccomend Magnesium and accupuncture.    She denies fever, chills, chest pain, SOB, nausea, vomiting, diarrhea, constipation, dysuria, or any other concerning signs of symptoms.        PMH:  Ovarian cancer      Past Surgical History: BRY, BSO, Appendectomy, omentectomy      Family History:  Ovarian cancer in sister (treated at [facility]), passed 6 months ago at age 60. Her genetic testing was reportedly negative (unclear  if germline or tumor).   Sister received 3 cycles neoadjuvant Carbo/Taxol (2019-2020), followed by aborted debulking (exlap and omtx only on 2020), then Carbo/Hoke/Georgina (3/2020-2020), Single agent Georgina (2020-2020), then NRG- (Cediranib/Olaparib) (10/2020-2021),  and finally single agent Doxil.  Otherwise denies a history of gyn related cancers including ovarian, endometrial, breast, pancreas, and GI cancers.      Social History: Tobacco user, denies EtOH or illicit drug use     OBGYN History:  The patient is a .  Entered menopause in her 50s.  Hx of fibroids dx at age 31, no known hx of abnl pap smears.  She never  used HRT      Screening:  -Pap smear: Approx age 49yrs  -Mammogram: Approx age 49yrs (normal)  -Colonoscopy: Approx age 49yrs (normal)    Objective    BSA: 2.27 meters squared  Pulse 99   Temp 35.4 °C (95.7 °F) (Core)   Resp 20   Wt 112 kg (248 lb)   LMP  (LMP Unknown)   SpO2 94%   BMI 41.27 kg/m²      Physical Exam  Constitutional:       General: She is not in acute  distress.     Appearance: Normal appearance. She is normal weight. She is not toxic-appearing.   HENT:      Head: Normocephalic.      Mouth/Throat:      Mouth: Mucous membranes are moist.      Pharynx: Oropharynx is clear.   Eyes:      Extraocular Movements: Extraocular movements intact.      Conjunctiva/sclera: Conjunctivae normal.      Pupils: Pupils are equal, round, and reactive to light.   Cardiovascular:      Rate and Rhythm: Normal rate and regular rhythm.      Heart sounds: Normal heart sounds. No murmur heard.     No friction rub. No gallop.   Pulmonary:      Effort: Pulmonary effort is normal.      Breath sounds: Normal breath sounds. No wheezing or rhonchi.   Abdominal:      General: Abdomen is flat. Bowel sounds are normal. There is no distension.      Palpations: Abdomen is soft.      Tenderness: There is no abdominal tenderness.   Genitourinary:     Comments: Normal external female genitalia without lesions or masses  Bimanual exam: smooth vagina without lesions or masses, surgically absent uterus, cervix, adnexa    Musculoskeletal:         General: No swelling. Normal range of motion.      Cervical back: Normal range of motion.   Skin:     General: Skin is warm and dry.   Neurological:      General: No focal deficit present.      Mental Status: She is alert and oriented to person, place, and time.   Psychiatric:         Mood and Affect: Mood normal.         Behavior: Behavior normal.       Lab on 05/19/2025   Component Date Value Ref Range Status    Cancer  05/19/2025 6.7  0.0 - 30.2 U/mL Final     BI mammo bilateral screening tomosynthesis  Narrative: Interpreted By:  Johnny Talavera,   STUDY:  BI MAMMO BILATERAL SCREENING TOMOSYNTHESIS;  7/30/2024 12:30 pm      ACCESSION NUMBER(S):  [id number]      ORDERING CLINICIAN:  ALFONZO POLLARD      INDICATION:  Screening.      COMPARISON:  01/28/2023      FINDINGS:  2D and tomosynthesis images were reviewed at 1 mm slice thickness.       Density:  The breast tissue is heterogeneously dense, which may  obscure small masses.      No suspicious masses or calcifications are identified.      Impression: No mammographic evidence of malignancy.      BI-RADS CATEGORY:      BI-RADS Category:  1 Negative.  Recommendation:  Annual Screening.  Recommended Date:  1 Year.  Laterality:  Bilateral.              For any future breast imaging appointments, please call [phone] ([phone]).      MACRO:  None      Signed by: Johnny Talavera 8/4/2024 2:02 PM  Dictation workstation:   [id number]        Performance Status:  Asymptomatic    Assessment/Plan     Oncology History Overview Note   Tumor History:  - Tumor Markers: CA-19-9 <4, CA-125 249.3, CEA 1.6  - 10/19/21: IR tissue biopsy (HGS cancer) and paracentesis (11.5L)   - 11/8/21: Paracentesis (12.1 L)  - 11/21: Neoadjuvant Carbo/Taxol initiated, CT after 3 cycles with interval improvement in disease   - 2/14/22: Ex lap, BRY, removal of pelvic mass, omentectomy, mobilization of splenic and hepatic flexures, peritonectomy of the right hemidiaphragm, peritonectomy of the posterior cul de sac and bladder, over sewing of the small and large bowel, appendectomy,  HIPEC, argon ablation of sigmoid colon.  - Completed adjuvant chemotherapy in 4/2022  - [facility] testing with BRCA1/HRD positive, Germline testing negative    - Olaparib May 2022-5/2024     Malignant neoplasm of ovary   7/27/2023 -  Chemotherapy    Olaparib, 28 Day Cycles - Gyn      10/11/2023 Initial Diagnosis    Malignant neoplasm of ovary (CMS/HCC)     Carcinomatosis (Multi)   7/27/2023 -  Chemotherapy    Olaparib, 28 Day Cycles - Gyn      10/3/2024 Initial Diagnosis    Carcinomatosis (Multi)       64 y.o. 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  ECOG 1     # High grade serous ovarian cancer  - [facility] my choice with BRCA 1 mutation  - s/p PARP maintenance, stopped 4/2024  - plan for surveillance visit in 3 months with  prior     # Neuropathy  - Neuropathy is stable in feet, improved in fingers. She will continue seeing supportive oncology as needed.  - Taking Duloxetine, Vitamin B6 and Pregabalin, recently held duloxetine     # Preventative Screening  - S/p mammography and colonoscopy    Scribe Attestation  By signing my name below, I, Jessica Allred, Monicaibe   attest that this documentation has been prepared under the direction and in the presence of Debra Parker MD, MS.     Provider Attestation - Scribe documentation    All medical record entries made by the Scribe were at my direction and personally dictated by me. I have reviewed the chart and agree that the record accurately reflects my personal performance of the history, physical exam, discussion and plan.    Debra Parker MD, MS

## 2025-05-22 ENCOUNTER — OFFICE VISIT (OUTPATIENT)
Dept: GYNECOLOGIC ONCOLOGY | Facility: CLINIC | Age: 65
End: 2025-05-22
Payer: COMMERCIAL

## 2025-05-22 VITALS
BODY MASS INDEX: 41.27 KG/M2 | TEMPERATURE: 95.7 F | WEIGHT: 248 LBS | OXYGEN SATURATION: 94 % | HEART RATE: 99 BPM | RESPIRATION RATE: 20 BRPM

## 2025-05-22 DIAGNOSIS — G62.0 CHEMOTHERAPY-INDUCED NEUROPATHY (MULTI): ICD-10-CM

## 2025-05-22 DIAGNOSIS — C56.9 MALIGNANT NEOPLASM OF OVARY, UNSPECIFIED LATERALITY (MULTI): Primary | ICD-10-CM

## 2025-05-22 DIAGNOSIS — Z85.43 ENCOUNTER FOR FOLLOW-UP SURVEILLANCE OF OVARIAN CANCER: ICD-10-CM

## 2025-05-22 DIAGNOSIS — Z08 ENCOUNTER FOR FOLLOW-UP SURVEILLANCE OF OVARIAN CANCER: ICD-10-CM

## 2025-05-22 DIAGNOSIS — T45.1X5A CHEMOTHERAPY-INDUCED NEUROPATHY (MULTI): ICD-10-CM

## 2025-05-22 PROCEDURE — 99214 OFFICE O/P EST MOD 30 MIN: CPT | Performed by: STUDENT IN AN ORGANIZED HEALTH CARE EDUCATION/TRAINING PROGRAM

## 2025-05-22 PROCEDURE — 1160F RVW MEDS BY RX/DR IN RCRD: CPT | Performed by: STUDENT IN AN ORGANIZED HEALTH CARE EDUCATION/TRAINING PROGRAM

## 2025-05-22 PROCEDURE — G2211 COMPLEX E/M VISIT ADD ON: HCPCS | Performed by: STUDENT IN AN ORGANIZED HEALTH CARE EDUCATION/TRAINING PROGRAM

## 2025-05-22 PROCEDURE — 1159F MED LIST DOCD IN RCRD: CPT | Performed by: STUDENT IN AN ORGANIZED HEALTH CARE EDUCATION/TRAINING PROGRAM

## 2025-05-22 PROCEDURE — 1126F AMNT PAIN NOTED NONE PRSNT: CPT | Performed by: STUDENT IN AN ORGANIZED HEALTH CARE EDUCATION/TRAINING PROGRAM

## 2025-05-22 ASSESSMENT — PAIN SCALES - GENERAL: PAINLEVEL_OUTOF10: 0-NO PAIN

## 2025-05-27 ENCOUNTER — APPOINTMENT (OUTPATIENT)
Dept: INTEGRATIVE MEDICINE | Facility: CLINIC | Age: 65
End: 2025-05-27
Payer: COMMERCIAL

## 2025-06-11 ENCOUNTER — APPOINTMENT (OUTPATIENT)
Dept: INTEGRATIVE MEDICINE | Facility: CLINIC | Age: 65
End: 2025-06-11
Payer: COMMERCIAL

## 2025-06-17 ENCOUNTER — APPOINTMENT (OUTPATIENT)
Dept: PRIMARY CARE | Facility: CLINIC | Age: 65
End: 2025-06-17
Payer: COMMERCIAL

## 2025-06-17 VITALS
HEIGHT: 65 IN | DIASTOLIC BLOOD PRESSURE: 76 MMHG | SYSTOLIC BLOOD PRESSURE: 126 MMHG | WEIGHT: 247 LBS | BODY MASS INDEX: 41.15 KG/M2

## 2025-06-17 DIAGNOSIS — C80.0 CARCINOMATOSIS (MULTI): ICD-10-CM

## 2025-06-17 DIAGNOSIS — R91.8 LUNG NODULE, MULTIPLE: ICD-10-CM

## 2025-06-17 DIAGNOSIS — M54.9 BACK PAIN, UNSPECIFIED BACK LOCATION, UNSPECIFIED BACK PAIN LATERALITY, UNSPECIFIED CHRONICITY: ICD-10-CM

## 2025-06-17 DIAGNOSIS — E66.01 MORBID OBESITY WITH BMI OF 40.0-44.9, ADULT (MULTI): ICD-10-CM

## 2025-06-17 DIAGNOSIS — E11.9 DIABETES MELLITUS WITHOUT COMPLICATION: Primary | ICD-10-CM

## 2025-06-17 DIAGNOSIS — J44.9 CHRONIC OBSTRUCTIVE PULMONARY DISEASE, UNSPECIFIED COPD TYPE (MULTI): ICD-10-CM

## 2025-06-17 DIAGNOSIS — F17.200 TOBACCO DEPENDENCE: ICD-10-CM

## 2025-06-17 PROCEDURE — 99214 OFFICE O/P EST MOD 30 MIN: CPT | Performed by: INTERNAL MEDICINE

## 2025-06-17 PROCEDURE — 3008F BODY MASS INDEX DOCD: CPT | Performed by: INTERNAL MEDICINE

## 2025-06-17 PROCEDURE — 3078F DIAST BP <80 MM HG: CPT | Performed by: INTERNAL MEDICINE

## 2025-06-17 PROCEDURE — 1159F MED LIST DOCD IN RCRD: CPT | Performed by: INTERNAL MEDICINE

## 2025-06-17 PROCEDURE — 3074F SYST BP LT 130 MM HG: CPT | Performed by: INTERNAL MEDICINE

## 2025-06-17 RX ORDER — GLIPIZIDE 5 MG/1
5 TABLET ORAL DAILY
Qty: 30 TABLET | Refills: 2 | Status: SHIPPED | OUTPATIENT
Start: 2025-06-17 | End: 2026-06-17

## 2025-06-17 ASSESSMENT — PATIENT HEALTH QUESTIONNAIRE - PHQ9
1. LITTLE INTEREST OR PLEASURE IN DOING THINGS: NOT AT ALL
2. FEELING DOWN, DEPRESSED OR HOPELESS: NOT AT ALL
SUM OF ALL RESPONSES TO PHQ9 QUESTIONS 1 AND 2: 0
1. LITTLE INTEREST OR PLEASURE IN DOING THINGS: NOT AT ALL
SUM OF ALL RESPONSES TO PHQ9 QUESTIONS 1 AND 2: 0
2. FEELING DOWN, DEPRESSED OR HOPELESS: NOT AT ALL

## 2025-06-17 NOTE — PATIENT INSTRUCTIONS
.keep a food diary , so when you see the nutritionist you can give an idea of what you eat  Please call our  [phone] to assist with scheduling   Nutrition    Be sure to make an appointment with Linda (they gave you the phone number

## 2025-06-17 NOTE — PROGRESS NOTES
Subjective   Patient ID: Sharmila Huerta is a 64 y.o. female who presents for Follow-up (ANNUAL?? 3 month fuv).    HPI  Patient in for a visit  Stressed out , she has to move in 30 days , almost moved out  need to be out by the 28th but can move out tomorrow , moving with her middle daughter   So she had been smoking more   She tried to take the metformin she had loose stools   She had a health checkup with her insurance NP suggested dietitian   She will go back to the icing    Review of Systems  General: Denies fever, chills, night sweats,  Eyes: Negative for recent visual changes  Ears, Nose, Throat :  Negative for hearing changes, sinus discomfort  Dermatologic: Negative for new skin conditions, rash  Respiratory: Negative for wheezing, shortness of breath, cough  Cardiovascular: Negative for chest pain, palpitations, or leg swelling  Gastrointestinal: Negative for nausea/vomiting, abdominal pain, changes in bowel habits  Genitourinary Negative for Urinary Incontinence  urgency , frequency, discomfort   Musculoskeletal: see hpi  Neurological: Negative for headaches, dizziness    Previous history  Medical History[1]  Surgical History[2]  Social History[3]  Family History[4]  Allergies[5]  Current Outpatient Medications   Medication Instructions    albuterol (Ventolin HFA) 90 mcg/actuation inhaler 2 puffs, inhalation, Every 8 hours PRN    DULoxetine (CYMBALTA) 30 mg, oral, Nightly, In addition to 60mg capsule for total of 90mg total    DULoxetine (CYMBALTA) 60 mg, oral, Nightly, In addition to 30mg capsule for total of 90mg    famotidine (PEPCID) 20 mg, oral, Nightly    fluticasone (Flonase) 50 mcg/actuation nasal spray 2 sprays, Each Nostril, Daily    ibuprofen (IBU) 600 mg, Every 6 hours PRN    magnesium oxide (Mag-Ox) 400 mg (241.3 mg magnesium) tablet 2 tablets, 2 times daily    metFORMIN (GLUCOPHAGE) 500 mg, oral, Daily with breakfast    polyethylene glycol (GAVILAX) 17 g, Daily PRN    pregabalin (LYRICA)  50 mg, oral, 2 times daily    tiotropium (Spiriva Respimat) 2.5 mcg/actuation inhaler 2 puffs, Daily    topiramate (TOPAMAX) 100 mg, oral, 2 times daily    zinc sulfate (Zincate) 220 (50 Zn) MG capsule 50 mg of elemental zinc, oral, Daily,  for change in taste       Objective       Physical Exam  Vital Signs: as recorded above  General: Well groomed, well nourished   Orientation:  Alert , oriented to time, place , and person   Mood and Affect:  Cooperative , no apparent distress normal affect  Skin: Good color, good turgor  Eyes: Extra ocular muscle movements intact, anicteric sclerae  Neck: Supple, full range of movement  Chest: Normal breath sounds, normal chest wall exam, symmetric, good air entry, clear to auscultation  Heart: Regular rate and rhythm, without murmur, gallop, or rubs  BACK:  no CTLS spine tenderness, no flank tenderness  Extremities: full range of movement  bilateral UE and bilateral LE,  no lower extremity edema  Neurological: Alert, oriented, cranial nerves II-XII grossly intact except for visual acuity  Sensation:  Intact   Gait: normal steady      Assessment/Plan   Sharmila Huerta is a 64 y.o. female who presents for the concerns below:    Problem List Items Addressed This Visit    None  DIABETES MELLITUS PLAN: hba1c is higher could not tolerate metformin   Follow Diabetic diet, will consult nutrition ,  exercise as discussed, weight control., before our next visit will obtain HbA1c, BMP, urine microalbumin, ophthalmology exam.  Need Podiatry checks periodically.  She had been at f group meeting      Her insurance sent someone to see her at home and did an eye exam they told her I should be seeing     Foot pain ,neuropathy  acupuncture for back pain   Seeing Zaire Hurt for acupuncture for pain in her feet , she is not taking gabapentin only as needed , supposed to go for   Palliative care ovarian cancer with Linda Ambriz but pt missed her appt 6/4 since she was given packing orders from  her apartment   Discussed with:   Return in :    Portions of this note were generated using digital voice recognition software, and may contain grammatical errors       Riana Dietrich MD  06/17/25  12:38 PM       [1]   Past Medical History:  Diagnosis Date    Carcinomatosis (Multi) 10/03/2024    Malignant neoplasm of unspecified ovary (Multi) 06/27/2022    Malignant neoplasm of ovary, unspecified laterality   [2]   Past Surgical History:  Procedure Laterality Date    OTHER SURGICAL HISTORY  06/20/2022    Hysterectomy abdominal,    US GUIDED ABDOMINAL PARACENTESIS  11/08/2021    US GUIDED ABDOMINAL PARACENTESIS 11/8/2021 [facility] ANCILLARY LEGACY    US GUIDED ABDOMINAL PARACENTESIS  11/30/2021    US GUIDED ABDOMINAL PARACENTESIS 11/30/2021 [facility] ANCILLARY LEGACY   [3]   Social History  Tobacco Use    Smoking status: Every Day     Types: Cigarettes     Passive exposure: Past    Smokeless tobacco: Never   Vaping Use    Vaping status: Never Used   Substance Use Topics    Alcohol use: Not Currently    Drug use: Not Currently   [4]   Family History  Problem Relation Name Age of Onset    Ovarian cancer Sister      Lung cancer Brother      No Known Problems Daughter Balta     No Known Problems Daughter Domonique     No Known Problems Daughter Daily     No Known Problems Other Child    [5]   Allergies  Allergen Reactions    Penicillins Unknown    Capital With Codeine [Acetaminophen-Codeine] Palpitations

## 2025-06-18 ENCOUNTER — APPOINTMENT (OUTPATIENT)
Dept: INTEGRATIVE MEDICINE | Facility: CLINIC | Age: 65
End: 2025-06-18
Payer: COMMERCIAL

## 2025-06-18 DIAGNOSIS — G62.0 CHEMOTHERAPY-INDUCED NEUROPATHY (MULTI): Primary | ICD-10-CM

## 2025-06-18 DIAGNOSIS — Z51.5 PALLIATIVE CARE ENCOUNTER: ICD-10-CM

## 2025-06-18 DIAGNOSIS — T45.1X5A CHEMOTHERAPY-INDUCED NEUROPATHY (MULTI): Primary | ICD-10-CM

## 2025-06-18 DIAGNOSIS — C56.9 MALIGNANT NEOPLASM OF OVARY, UNSPECIFIED LATERALITY (MULTI): ICD-10-CM

## 2025-06-18 PROCEDURE — 97140 MANUAL THERAPY 1/> REGIONS: CPT | Performed by: HOSPITALIST

## 2025-06-18 NOTE — PROGRESS NOTES
Manual Therapy Visit:     Sharmila Huerta was referred by Ney Tan M.D.    Condition of Client Subjective:    Patient ID: Sharmila is a 64 y.o. female who presents for reason for visit of low back, hand, foot pain.    HPI: ovarian cancer    Session Information  Visit Type: manual therapy  Description of present complaint: low back pain, hand/foot pain    Review of Systems    Objective   Pre-treatment Assessment  Arrival Mode: Ambulatory    Wellbeing Level (0-10): 6  Coping Level (0-10): 2  Pain Score (0-10)9  Fatigue Level (0-10): 4  Anxiety Level (0-10): 5  Depression Level (0-10): 0  Stress Level (0-10): 2  Nausea Level (0-10): 0    Actions Assessment/Plan :  Provider reviewed plan for the manual therapy session, precautions and contraindications. Patient has given consent to treat with full understanding of what to expect during the session. Before manual therapy began, provider explained to the patient to communicate at any time if the pressure was causing discomfort past their tolerance level. Patient agreed to advise therapist.    Treatment:  MANUAL THERAPY 39 minutes    Patient Position: Table, Supine, prone  Positioning Assistance: Did not need assistance  Massage Technique: Other (specify) (Manual therapy)  Area/Body Region: upper body only  Pressure Scale: 1 - Light pressure, 2 - Mild pressure    Response:  Post-treatment Assessment  Wellbeing Level (0-10): 7  Coping Level (0-10): 3  Pain Score (0-10)7  Fatigue Level (0-10): 2  Anxiety Level (0-10): 3  Depression Level (0-10): 0  Stress Level (0-10): 0  Nausea Level (0-10): 0

## 2025-06-19 DIAGNOSIS — E11.9 DIABETES MELLITUS WITHOUT COMPLICATION: Primary | ICD-10-CM

## 2025-06-19 NOTE — LETTER
June 19, 2025     Sharmila Huerta    Patient: Sharmila Huerta   YOB: 1960   Date of Visit: 6/19/2025         Good morning, we received a letter from your insurance recommending you see ophthalmology for eye checks especially for diabetic eye check. I placed the consult in please .  Please call our  [phone] to assist with scheduling  ophthalmology  Dr Estela Guzman , Dr Laine Rubalcava , Dr River Rosa   Or first available.  if you have any questions please send me a message or give our office a call [phone].  Thank you

## 2025-06-30 ENCOUNTER — APPOINTMENT (OUTPATIENT)
Dept: INTEGRATIVE MEDICINE | Facility: CLINIC | Age: 65
End: 2025-06-30
Payer: COMMERCIAL

## 2025-07-02 ENCOUNTER — APPOINTMENT (OUTPATIENT)
Dept: INTEGRATIVE MEDICINE | Facility: CLINIC | Age: 65
End: 2025-07-02
Payer: COMMERCIAL

## 2025-07-02 DIAGNOSIS — M54.59 OTHER LOW BACK PAIN: Primary | ICD-10-CM

## 2025-07-02 PROCEDURE — 97810 ACUP 1/> WO ESTIM 1ST 15 MIN: CPT | Performed by: NATUROPATH

## 2025-07-02 PROCEDURE — 97811 ACUP 1/> W/O ESTIM EA ADD 15: CPT | Performed by: NATUROPATH

## 2025-07-02 NOTE — PROGRESS NOTES
"Acupuncture Visit:     Subjective   Patient ID: Sharmila Huerta is a 65 y.o. female who presents for No chief complaint on file.  Had a lot of stress with move  Pain all over when moving.   Low back pain has been increased with lifting items  Has been dong self massage  Has massage from [facility] and felt fantastic.       previous  Has to move, causing stress  Increased back pain  Neuropathy is similar.         Neuropathy is the same,   Does get relief for a few days after tx, but maybe broken over the week  Still shooting pain occasionally          Was able to replace medication  Pain has been down some  But gets electric shocks  Sleep- improved since in less pain, still waking often            mother in law passed yesterday    Has lost her medication, can't find it in her house  Pain similar in feet, worse since off meds  Was on feet for a day around [facility].       Previous  More fatigue yesterday  Pain is similar- some short term relief after last  Shoulder still painful, using tiger balm.       Had change in medications  Pain is higher some days, owrse in feet  Occ hands will flare.   Right shoulder pain x 2 weeks, feel it is from how she is sleeping.         No change in hands  Pinkey and big toes are still the worse  Tips of fingers        Still achy in fingers and hands  Righty pinky pain persists  Feels cold in general  Scrambler still scheduled 3/4      Fingers achy  Has right pinkey pain  Saw PCP- discussed arthtitis pain  Feet- achy also   Wakes from discomfort in whole body  Scrambler on 3/4      Feeling ok  HA to start the day  5/10 pain in feet, feel \"webbed\"  Sleep- has had more trouble falling asleep recently        Feeling good  No extreme pain, less achy  4/10 in feet  Sleep- good overall        Pain is less today  3-4/10 in feet and toes only  Hands 4/10 not too bad  Sleep, no issues.         More pain again this week  8/10  Still just in feet, no radiation up legs.   Hands are a little worse, nut not " as bad.   Sleep good.      Had improved pain last week  More pain this week, has been more busy and has more stressors  5/10 today  Feet  are the worst. Occ hands.       previous  Less pain the whole week  5/10 today  Shoulder, 3/10        Not much change after last tx  More neuropathy yesterday,  Hands are generally OK  Shoulder pain, occ hips  Can have knee pain.   Feet- 9/10 today  Shoulder 8/10  Feels she can walk up stairs easier than in past    Previous  Pt reported that her CIPN is active and joint aches.  CIPN in feet presents largely in toes described as intermittently painful with pressure, worsens with cold, when cold toes ache and throb.  CIPN in hands is less and more manageable.  Both sites are numb and tingling.  Joint pain is also achy that is present right shoulder with difficulty and increased pain with movement.  She also has right sided forearm pain which is on and off.          Session Information  Is this acupuncture treatment being billed to the patient's insurance company: Yes  This is visit number: 1  The patient has a total number of visits of: 12  Initial Acupuncture Treatment date: 07/02/25  Last Treatment date: 09/30/25  Name of Insurance Company: [facility]  Visit Type: Follow-up visit  Medical History Reviewed: I have reviewed pertinent medical history in EHR, and no contraindications are present to provide treatment         Review of Systems         Provider reviewed plan for the acupuncture session, precautions and contraindications. Patient/guardian/hospital staff has given consent to treat with full understanding of what to expect during the session. Before acupuncture began, provider explained to the patient to communicate at any time if the procedure was causing discomfort past their tolerance level. Patient agreed to advise acupuncturist. The acupuncturist counseled the patient on the risks of acupuncture treatment including pain, infection, bleeding, and no relief of pain. The patient  was positioned comfortably. There was no evidence of infection at the site of needle insertions.    Objective   Physical Exam       Acupuncture Treatment  Needle Guage: 40 guage /.16/ Red seirin  Body Points - Bilateral: LI15, Li4, Si3, SP6, KI6, Ba yandel  Body Points - Right: SJ14  Other Techniques Utilized: TDP Lamp  TDP Lamp Descripton: feet  Needle Count In: 17  Needle Count Out: 17  Needle Retention Time (min): 25 minutes  Total Face to Face Time (min): 25 minutes                      Assessment/Plan   Diagnoses and all orders for this visit:  Other low back pain (Primary)          Diagnoses and all orders for this visit:  Other low back pain (Primary)

## 2025-07-15 ENCOUNTER — APPOINTMENT (OUTPATIENT)
Dept: CARDIAC REHAB | Facility: CLINIC | Age: 65
End: 2025-07-15
Payer: COMMERCIAL

## 2025-07-17 ENCOUNTER — TELEMEDICINE CLINICAL SUPPORT (OUTPATIENT)
Dept: CARDIAC REHAB | Facility: CLINIC | Age: 65
End: 2025-07-17
Payer: COMMERCIAL

## 2025-07-17 DIAGNOSIS — F17.210 CIGARETTE NICOTINE DEPENDENCE WITHOUT COMPLICATION: Primary | ICD-10-CM

## 2025-07-17 PROCEDURE — 99407 BEHAV CHNG SMOKING > 10 MIN: CPT | Mod: 95 | Performed by: INTERNAL MEDICINE

## 2025-07-17 NOTE — PROGRESS NOTES
Tobacco Treatment Counseling Initial Visit    Name: Sharmila Huerta     MRN: [id number]       YOB: 1960     Age: 65 y.o.               Today’s Date: 7/17/2025  Primary Care Physician: Riana Dietrich MD    Virtual or Telephone Consent    While technically available, the patient was unable or unwilling to consent to connect via audio/video telehealth technology; therefore, I performed this visit using a real-time audio only connection between Sharmila Huerta & Jessi Carrasquillo RRT.    Verbal consent was requested and obtained from Sharmila Huerta on this date, 07/17/25 for a telehealth visit and the patient's location was confirmed at the time of the visit.         Start time: ***   End time: ***      Sharmila Huerta presents for initial session for Tobacco Treatment Counseling. Patient currently smoking *** {CPD/PPD:26020} and has been smoking {Smoking Duration:78149}. Patient has a {low/moderate/high:18063} dependence on nicotine according to the [employer] nicotine dependence assessment. At this time, patient is {motivated/unmotivated:00513} to quit smoking due to ***. See below for detailed assessment of tobacco use and treatment plan.    CO level (@ time of appt.): ***    Smoking triggers/routines:  ***    Past quit attempts:  ***    Potential barriers to quitting:  ***    Strengths:  ***    Medication plan:  ***    Coping strategies:  ***    Next steps:  ***  -TQD (date or TBD)    Follow-up scheduled on (date/time) at (location) to evaluate progress and make any necessary changes to quit plan. Patient to call office at [phone] with any questions/concerns.      Jessi Carrasquillo, RRT    strategies:  -Substitutes/replacements: straws, toothpicks, candy, suckers, ect.  -Distraction techniques-deep breathing, cleaning, playing games, taking a walk, ect.  -Positive thinking/positive self-talk  -Start a smoke log to track her exact amount/triggers  -“Quitting Tobacco” booklet emailed to patient     Next steps:  -cut back 1-2 CPD by her next appointment  -target quit date: TBD    Follow-up scheduled in two weeks to evaluate progress and make any necessary changes to quit plan. Patient to call office at [phone] with any questions/concerns.    Jessi Carrasquillo, RRT

## 2025-07-23 ENCOUNTER — APPOINTMENT (OUTPATIENT)
Dept: PALLIATIVE MEDICINE | Facility: CLINIC | Age: 65
End: 2025-07-23
Payer: COMMERCIAL

## 2025-07-23 VITALS
DIASTOLIC BLOOD PRESSURE: 74 MMHG | HEART RATE: 97 BPM | SYSTOLIC BLOOD PRESSURE: 127 MMHG | BODY MASS INDEX: 40.87 KG/M2 | TEMPERATURE: 97.5 F | WEIGHT: 245.6 LBS | RESPIRATION RATE: 18 BRPM | OXYGEN SATURATION: 97 %

## 2025-07-23 DIAGNOSIS — R53.0 NEOPLASTIC MALIGNANT RELATED FATIGUE: ICD-10-CM

## 2025-07-23 DIAGNOSIS — T45.1X5A CHEMOTHERAPY-INDUCED PERIPHERAL NEUROPATHY (MULTI): Primary | ICD-10-CM

## 2025-07-23 DIAGNOSIS — G47.09 OTHER INSOMNIA: ICD-10-CM

## 2025-07-23 DIAGNOSIS — G62.0 CHEMOTHERAPY-INDUCED PERIPHERAL NEUROPATHY (MULTI): Primary | ICD-10-CM

## 2025-07-23 PROCEDURE — 3078F DIAST BP <80 MM HG: CPT | Performed by: NURSE PRACTITIONER

## 2025-07-23 PROCEDURE — 3074F SYST BP LT 130 MM HG: CPT | Performed by: NURSE PRACTITIONER

## 2025-07-23 PROCEDURE — 1159F MED LIST DOCD IN RCRD: CPT | Performed by: NURSE PRACTITIONER

## 2025-07-23 PROCEDURE — 99214 OFFICE O/P EST MOD 30 MIN: CPT | Performed by: NURSE PRACTITIONER

## 2025-07-23 PROCEDURE — 1126F AMNT PAIN NOTED NONE PRSNT: CPT | Performed by: NURSE PRACTITIONER

## 2025-07-23 ASSESSMENT — ENCOUNTER SYMPTOMS
OCCASIONAL FEELINGS OF UNSTEADINESS: 0
DEPRESSION: 0
LOSS OF SENSATION IN FEET: 0

## 2025-07-23 ASSESSMENT — PAIN SCALES - GENERAL: PAINLEVEL_OUTOF10: 0-NO PAIN

## 2025-07-23 NOTE — PROGRESS NOTES
SUPPORTIVE AND PALLIATIVE ONCOLOGY OUTPATIENT FOLLOW-UP    Subjective   HISTORY OF PRESENT ILLNESS: Sharmila Huerta is a 65 y.o. 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 She follows with Dr. Parker as her primary oncologist, currently pursuing maintenance therapy with Olaparib.    Additional PMHx; DM    7/23/25: Previously followed with Maien Lucero CNP.   Currently on surveillance. Pursing massage and acupuncture for her neuropathy with improvement. No longer taking duloxetine due to day time sleepiness. However, noticed no change in neuropathy. Also not taking lyrica or Topamax with no change. Plans to reschedule scrambler now that she has transportation through her insurance.     Pain Assessment:  None - has general arthralgia at times     Additional Symptom Assessment:    Numbness or tingling in hands/feet/other: moderate - hands and feet   Headache: none  Lack of appetite: none  Weight loss: none  Nausea/early satiety: none  Vomiting: none  Constipation: none  Diarrhea: none  Lack of energy: a little  Difficulty sleeping: a little - sleep maintenance   Anxiety: none  Depression: none  Shortness of breath: none  Other: none      Information obtained from: chart review, interview of patient, and interview of family  ______________________________________________________________________        Objective     CREATININE   Date Value Ref Range Status   05/19/2025 0.94 0.50 - 1.05 mg/dL Final     AST   Date Value Ref Range Status   10/30/2024 11 9 - 39 U/L Final     ALT   Date Value Ref Range Status   10/30/2024 7 7 - 45 U/L Final     Comment:     Patients treated with Sulfasalazine may generate falsely decreased results for ALT.     Hemoglobin   Date Value Ref Range Status   07/22/2024 10.5 (L) 12.0 - 16.0 g/dL Final     Platelets   Date Value Ref Range Status   07/22/2024 261 150 - 450 x10*3/uL Final       PHYSICAL EXAMINATION       10/31/2024     3:32 PM 12/4/2024     " 1:55 PM 2/13/2025     3:50 PM 3/17/2025    11:47 AM 5/22/2025     3:49 PM 6/17/2025    12:08 PM 7/23/2025     2:10 PM   Vitals   Systolic 121  123 120  126 127   Diastolic 74  79 70  76 74   BP Location   Left arm  Left arm     Heart Rate 106 105 99 90 99  97   Temp 36.4 °C (97.5 °F) 36.2 °C (97.2 °F) 36.1 °C (97 °F)  35.4 °C (95.7 °F)  36.4 °C (97.5 °F)   Resp 18 18 18  20  18   Height    1.651 m (5' 5\")  1.651 m (5' 5\")    Weight (lb) 245.7 242.29 252.9 250 248 247 245.6   BMI 40.89 kg/m2 40.32 kg/m2 42.08 kg/m2 41.6 kg/m2 41.27 kg/m2 41.1 kg/m2 40.87 kg/m2   BSA (m2) 2.26 m2 2.25 m2 2.3 m2 2.28 m2 2.27 m2 2.27 m2 2.26 m2   Visit Report Report Report Report Report Report Report Report     Wt Readings from Last 10 Encounters:   07/23/25 111 kg (245 lb 9.6 oz)   06/17/25 112 kg (247 lb)   05/22/25 112 kg (248 lb)   03/17/25 113 kg (250 lb)   02/13/25 115 kg (252 lb 14.4 oz)   12/04/24 110 kg (242 lb 4.6 oz)   10/31/24 111 kg (245 lb 11.2 oz)   10/28/24 111 kg (245 lb)   10/03/24 111 kg (245 lb)   07/30/24 113 kg (249 lb 1.9 oz)       Physical Exam  HENT:      Head: Normocephalic and atraumatic.     Eyes:      Extraocular Movements: Extraocular movements intact.      Conjunctiva/sclera: Conjunctivae normal.     Pulmonary:      Effort: Pulmonary effort is normal.   Abdominal:      General: Abdomen is flat.     Neurological:      General: No focal deficit present.      Mental Status: She is alert.     Psychiatric:         Mood and Affect: Mood normal.         Thought Content: Thought content normal.           ASSESSMENT/PLAN    Arthralgia Pain/CIPN  - Patient self discontinued duloxetine 90mg, lyrica, topamax due to no improvement in sx  - Continue Acetaminophen 500mg-1000 [id number] PRN for pain - rare use   - Continue Ibuprofen 600mg [id number] PRN - rare use  - Continue acupuncture and self massage   - Schedule Scrambler now that she has transportation      Insomnia (maintenance)  - Trial melatonin 3-5mg at bedtime as " needed     Fatigue  - Encouraged daily physical activity as tolerated    Next Follow-Up Visit:  Return to clinic 3 months    Signature and billing  Medical complexity was moderate level due to due to complexity of problems, extensive data review, and high risk of management/treatment.  Time was spent on the following: Prep Time, Time Directly with Patient/Family/Caregiver, Documentation Time. Total time spent: 30      Data  Diagnostic tests and information reviewed for today's visit:  Most recent labs and imaging results, Medications     Some elements copied from Palliative Care note on 3/20/2025, the elements have been updated and all reflect current decision making from today, 3/20/2025.    Plan of Care discussed with: Patient and Family    SIGNATURE: EBONI Daly-CNP    Contact information:  [facility]ive [facility] Palliative Oncology  Monday-Friday 8 AM-5 PM  Phone:  [phone], press option #5  Or Epic Secure Chat

## 2025-07-24 ENCOUNTER — TELEPHONE (OUTPATIENT)
Dept: PAIN MEDICINE | Facility: CLINIC | Age: 65
End: 2025-07-24
Payer: COMMERCIAL

## 2025-07-29 ENCOUNTER — APPOINTMENT (OUTPATIENT)
Dept: INTEGRATIVE MEDICINE | Facility: CLINIC | Age: 65
End: 2025-07-29
Payer: COMMERCIAL

## 2025-07-29 DIAGNOSIS — M54.59 OTHER LOW BACK PAIN: Primary | ICD-10-CM

## 2025-07-29 PROCEDURE — 97811 ACUP 1/> W/O ESTIM EA ADD 15: CPT | Performed by: NATUROPATH

## 2025-07-29 PROCEDURE — 97810 ACUP 1/> WO ESTIM 1ST 15 MIN: CPT | Performed by: NATUROPATH

## 2025-07-29 NOTE — PROGRESS NOTES
"Acupuncture Visit:     Subjective   Patient ID: Sharmila Huerta is a 65 y.o. female who presents for No chief complaint on file.  Still getrs relief from acupuncture in the hands  Has been off meds  Is planning visit for scrambler  Low back- achy, keeps moving and stretching. Worse in the evening  Trying to quit smoking. Going to meeting tomorrow.         Had a lot of stress with move  Pain all over when moving.   Low back pain has been increased with lifting items  Has been dong self massage  Has massage from [facility] and felt fantastic.       previous  Has to move, causing stress  Increased back pain  Neuropathy is similar.         Neuropathy is the same,   Does get relief for a few days after tx, but maybe broken over the week  Still shooting pain occasionally          Was able to replace medication  Pain has been down some  But gets electric shocks  Sleep- improved since in less pain, still waking often            mother in law passed yesterday    Has lost her medication, can't find it in her house  Pain similar in feet, worse since off meds  Was on feet for a day around [facility].       Previous  More fatigue yesterday  Pain is similar- some short term relief after last  Shoulder still painful, using tiger balm.       Had change in medications  Pain is higher some days, owrse in feet  Occ hands will flare.   Right shoulder pain x 2 weeks, feel it is from how she is sleeping.         No change in hands  Pinkey and big toes are still the worse  Tips of fingers        Still achy in fingers and hands  Righty pinky pain persists  Feels cold in general  Scrambler still scheduled 3/4      Fingers achy  Has right pinkey pain  Saw PCP- discussed arthtitis pain  Feet- achy also   Wakes from discomfort in whole body  Scrambler on 3/4      Feeling ok  HA to start the day  5/10 pain in feet, feel \"webbed\"  Sleep- has had more trouble falling asleep recently        Feeling good  No extreme pain, less achy  4/10 in feet  Sleep- good " overall        Pain is less today  3-4/10 in feet and toes only  Hands 4/10 not too bad  Sleep, no issues.         More pain again this week  8/10  Still just in feet, no radiation up legs.   Hands are a little worse, nut not as bad.   Sleep good.      Had improved pain last week  More pain this week, has been more busy and has more stressors  5/10 today  Feet  are the worst. Occ hands.       previous  Less pain the whole week  5/10 today  Shoulder, 3/10        Not much change after last tx  More neuropathy yesterday,  Hands are generally OK  Shoulder pain, occ hips  Can have knee pain.   Feet- 9/10 today  Shoulder 8/10  Feels she can walk up stairs easier than in past    Previous  Pt reported that her CIPN is active and joint aches.  CIPN in feet presents largely in toes described as intermittently painful with pressure, worsens with cold, when cold toes ache and throb.  CIPN in hands is less and more manageable.  Both sites are numb and tingling.  Joint pain is also achy that is present right shoulder with difficulty and increased pain with movement.  She also has right sided forearm pain which is on and off.          Session Information  Is this acupuncture treatment being billed to the patient's insurance company: Yes  This is visit number: 2  The patient has a total number of visits of: 12  Initial Acupuncture Treatment date: 07/02/25  Last Treatment date: 09/30/25  Name of Insurance Company: [facility] [employer]  Visit Type: Follow-up visit  Medical History Reviewed: I have reviewed pertinent medical history in EHR, and no contraindications are present to provide treatment         Review of Systems         Provider reviewed plan for the acupuncture session, precautions and contraindications. Patient/guardian/hospital staff has given consent to treat with full understanding of what to expect during the session. Before acupuncture began, provider explained to the patient to communicate at any time if the procedure was  causing discomfort past their tolerance level. Patient agreed to advise acupuncturist. The acupuncturist counseled the patient on the risks of acupuncture treatment including pain, infection, bleeding, and no relief of pain. The patient was positioned comfortably. There was no evidence of infection at the site of needle insertions.    Objective   Physical Exam       Acupuncture Treatment  Needle Guage: 40 guage /.16/ Red seirin  Body Points - Bilateral: LI15, Li4, Si3, SP6, KI6, Ba yandel  Body Points - Right: SJ14  Auricular points - Bilateral: NADA   Other Techniques Utilized: TDP Lamp  TDP Lamp Descripton: feet  Needle Count In: 27   Needle Count Out: 27  Needle Retention Time (min): 25 minutes  Total Face to Face Time (min): 25 minutes                      Assessment/Plan   Diagnoses and all orders for this visit:  Other low back pain (Primary)            Diagnoses and all orders for this visit:  Other low back pain (Primary)

## 2025-07-31 ENCOUNTER — TELEMEDICINE CLINICAL SUPPORT (OUTPATIENT)
Dept: CARDIAC REHAB | Facility: CLINIC | Age: 65
End: 2025-07-31
Payer: COMMERCIAL

## 2025-07-31 NOTE — PROGRESS NOTES
Tobacco Treatment Counseling Follow Up Visit    Name: Sharmila Huerta            MRN: [id number]          YOB: 1960           Age: 65 y.o.                  Today's Date: 7/31/2025  Primary Care Physician: Riana Dietrich MD  Referring Physician: No ref. provider found  Program Location: [address]     Virtual or Telephone Consent    While technically available, the patient was unable or unwilling to consent to connect via audio/video telehealth technology; therefore, I performed this visit using a real-time audio only connection between Sharmila Huerta & Jessi Carrasquillo, KETAN.  Verbal consent was requested and obtained from Sharmila Huerta on this date, 07/31/25 for a telehealth visit and the patient's location was confirmed at the time of the visit.       Start time: ***   End time: ***    Sharmila Huerta presents for follow up session for Tobacco Treatment Counseling. UPDATES:       Jessi Carrasquillo, RRT

## 2025-08-04 ENCOUNTER — APPOINTMENT (OUTPATIENT)
Dept: INTEGRATIVE MEDICINE | Facility: CLINIC | Age: 65
End: 2025-08-04
Payer: COMMERCIAL

## 2025-08-04 DIAGNOSIS — M54.59 OTHER LOW BACK PAIN: Primary | ICD-10-CM

## 2025-08-04 PROCEDURE — 97810 ACUP 1/> WO ESTIM 1ST 15 MIN: CPT | Performed by: NATUROPATH

## 2025-08-04 PROCEDURE — 97811 ACUP 1/> W/O ESTIM EA ADD 15: CPT | Performed by: NATUROPATH

## 2025-08-04 NOTE — PROGRESS NOTES
"Acupuncture Visit:     Subjective   Patient ID: Sharmila Huerta is a 65 y.o. female who presents for No chief complaint on file.  Still neuropathy. Better than in past  Low back still achy. Is doing more stretching.           Still gets relief from acupuncture in the hands  Has been off meds  Is planning visit for scrambler  Low back- achy, keeps moving and stretching. Worse in the evening  Trying to quit smoking. Going to meeting tomorrow.         Had a lot of stress with move  Pain all over when moving.   Low back pain has been increased with lifting items  Has been dong self massage  Has massage from [facility] and felt fantastic.       previous  Has to move, causing stress  Increased back pain  Neuropathy is similar.         Neuropathy is the same,   Does get relief for a few days after tx, but maybe broken over the week  Still shooting pain occasionally          Was able to replace medication  Pain has been down some  But gets electric shocks  Sleep- improved since in less pain, still waking often            mother in law passed yesterday    Has lost her medication, can't find it in her house  Pain similar in feet, worse since off meds  Was on feet for a day around [facility].       Previous  More fatigue yesterday  Pain is similar- some short term relief after last  Shoulder still painful, using tiger balm.       Had change in medications  Pain is higher some days, owrse in feet  Occ hands will flare.   Right shoulder pain x 2 weeks, feel it is from how she is sleeping.         No change in hands  Pinkey and big toes are still the worse  Tips of fingers        Still achy in fingers and hands  Righty pinky pain persists  Feels cold in general  Scrambler still scheduled 3/4      Fingers achy  Has right pinkey pain  Saw PCP- discussed arthtitis pain  Feet- achy also   Wakes from discomfort in whole body  Scrambler on 3/4      Feeling ok  HA to start the day  5/10 pain in feet, feel \"webbed\"  Sleep- has had more trouble " falling asleep recently        Feeling good  No extreme pain, less achy  4/10 in feet  Sleep- good overall        Pain is less today  3-4/10 in feet and toes only  Hands 4/10 not too bad  Sleep, no issues.         More pain again this week  8/10  Still just in feet, no radiation up legs.   Hands are a little worse, nut not as bad.   Sleep good.      Had improved pain last week  More pain this week, has been more busy and has more stressors  5/10 today  Feet  are the worst. Occ hands.       previous  Less pain the whole week  5/10 today  Shoulder, 3/10        Not much change after last tx  More neuropathy yesterday,  Hands are generally OK  Shoulder pain, occ hips  Can have knee pain.   Feet- 9/10 today  Shoulder 8/10  Feels she can walk up stairs easier than in past    Previous  Pt reported that her CIPN is active and joint aches.  CIPN in feet presents largely in toes described as intermittently painful with pressure, worsens with cold, when cold toes ache and throb.  CIPN in hands is less and more manageable.  Both sites are numb and tingling.  Joint pain is also achy that is present right shoulder with difficulty and increased pain with movement.  She also has right sided forearm pain which is on and off.          Session Information  Is this acupuncture treatment being billed to the patient's insurance company: Yes  This is visit number: 3  The patient has a total number of visits of: 12  Initial Acupuncture Treatment date: 07/02/25  Last Treatment date: 09/30/25  Name of Insurance Company: [facility] [employer]  Visit Type: Follow-up visit  Medical History Reviewed: I have reviewed pertinent medical history in EHR, and no contraindications are present to provide treatment         Review of Systems         Provider reviewed plan for the acupuncture session, precautions and contraindications. Patient/guardian/hospital staff has given consent to treat with full understanding of what to expect during the session. Before  acupuncture began, provider explained to the patient to communicate at any time if the procedure was causing discomfort past their tolerance level. Patient agreed to advise acupuncturist. The acupuncturist counseled the patient on the risks of acupuncture treatment including pain, infection, bleeding, and no relief of pain. The patient was positioned comfortably. There was no evidence of infection at the site of needle insertions.    Objective   Physical Exam       Acupuncture Treatment  Needle Guage: 40 guage /.16/ Red seirin  Body Points - Bilateral: LI15, Li4, Si3, SP6, KI6, Ba yandel  Body Points - Right: SJ14  Other Techniques Utilized: TDP Lamp  TDP Lamp Descripton: feet  Needle Count In: 17  Needle Count Out: 17  Needle Retention Time (min): 25 minutes  Total Face to Face Time (min): 25 minutes                      Assessment/Plan   Diagnoses and all orders for this visit:  Other low back pain (Primary)            Diagnoses and all orders for this visit:  Other low back pain (Primary)

## 2025-08-05 ENCOUNTER — NUTRITION (OUTPATIENT)
Dept: NUTRITION | Facility: HOSPITAL | Age: 65
End: 2025-08-05
Payer: COMMERCIAL

## 2025-08-05 VITALS — BODY MASS INDEX: 40.87 KG/M2 | HEIGHT: 65 IN

## 2025-08-05 DIAGNOSIS — E11.9 DIABETES MELLITUS WITHOUT COMPLICATION: Primary | ICD-10-CM

## 2025-08-05 PROCEDURE — 97802 MEDICAL NUTRITION INDIV IN: CPT

## 2025-08-05 NOTE — PROGRESS NOTES
Nutrition Initial Assessment:     Patient Sharmila Huerta is a 65 y.o. female being seen at [facility] who was referred by Riana Dietrich Am* on 6/17/25 for   1. Diabetes mellitus without complication            Nutrition Assessment    Problem List[1]     Nutrition History:  Food & Nutrition History: Here today looking for guidance on diet and DM. Tried Metformin and did not go well so does not want to take medicine. Pt stated her doctor wants her to lose wt and pt is ok with losing some wt.  Meal times vary as sleeping schedule varies. Meals and feeding times are inconsistent. On average may eat 2 meals a day and nibble on some foods during the day. GI Symptoms : None.  Oral Problems: denies.  Dentition : about to get top teeth pulled and plans to get dentures on upper .  Food Insecurity: absent.  Food Allergies:  (none);  Food Intolerances: none  Vitamin/mineral intake: Multivitamin Magnesium  Sleep Habits: 7+ hrs disrupted (aches when sleeping so does not sleep well. Bedtimes and wake times vary. Sleeping habits have gotten a little better since moving in with her middle daughter. Has KIMBERLY but does not wear a CPAP.)    Diet Recall:  4am Cup coffee with creamer (has been cutting back on creamer)  B (8-9am): 1 eggs, bread (has been trying to do more whole grains but today was white), sausage (x3 patties), cherries; skips breakfast most days of the week; OR cheerio's  L (12-1pm): apple; OR smoothies made with banana with spinach, avocado, strawberries and an apple); skips lunch sometimes  D (6pm): pork chops, rice and cornbread; OR fish, chicken wings, coleslaw and watermelon (out to eat); OR pasta with shrimp, sausage and noodles, and chicken (2 small portions of this)  Food Variety: Present  Oral Nutrition Supplement Use: Oral Nutrition Supplements:  (none)           Fluid Intake: Mostly water, coffee with creamer, fruit juice (6oz) once a day every so often, regular soda every so often, unsweet  "tea (herbal teas)  Energy Intake: Good > 75 %    Diabetes Nutrition History:  Diagnosed: beginning of 2025  Prior Nutrition Education: none  SMBG: does not check, was not told to   Hypoglycemia: unknown   Current DM Medications/Insulin Regimen: none, not taking any as she wants to manage this through diet    Food Preparation:  Cooking: Patient  Grocery Shopping: Patient  Dining Out: 1 to 3 times a month    Physical Activity:   Tries to do some exercise as she can usually leg lifts and shoulder shrugs or lifting her arms; walks around and moving around a lot more    Anthropometrics:  Height: 165.1 cm (5' 5\")   Body mass index is 40.87 kg/m².    Weight History:   Daily Weight  07/23/25 : 111 kg (245 lb 9.6 oz)  06/17/25 : 112 kg (247 lb)  05/22/25 : 112 kg (248 lb)  03/17/25 : 113 kg (250 lb)  02/13/25 : 115 kg (252 lb 14.4 oz)  12/04/24 : 110 kg (242 lb 4.6 oz)  10/31/24 : 111 kg (245 lb 11.2 oz)  10/28/24 : 111 kg (245 lb)  10/03/24 : 111 kg (245 lb)  07/30/24 : 113 kg (249 lb 1.9 oz)    Weight Change %:  Weight History / % Weight Change: UBW has been ~249# for awhile and has lost 4# the last month which she is happy about    Nutrition Focused Physical Exam Findings:  Subcutaneous Fat Loss:   Defer Subcutaneous Fat Loss Assessment: Defer all  Defer All Reason: No visual signs of subcutaneous fat wasting present    Muscle Wasting:  Defer Muscle Wasting Assessment: Defer all  Defer All Reason: No visual signs of muscle wasting present    Nutrition Significant Labs:  CMP Trend:   Recent Labs     05/19/25  1120 02/11/25  0857 10/30/24  1012 07/22/24  1051 04/23/24  1759   GLUCOSE 122* 129* 146* 151* 97    140 141 142 136   K 4.4 4.2 4.4 4.2 4.1    110 107 110* 104   CO2 27 22 26 23 24   ANIONGAP 8 8 12 13 12   BUN 10 17 13 13 13   CREATININE 0.94 0.96 0.93 0.87 1.01   EGFR 67 66 68 74 62   CALCIUM 9.9 9.2 9.4 9.3 8.9   ALBUMIN  --   --  4.0 3.9 3.6   ALKPHOS  --   --  70 70 62   PROT  --   --  7.0 7.0 7.0 " "  AST  --   --  11 9 10   BILITOT  --   --  0.4 0.4 0.5   ALT  --   --  7 8 8      CBC Trend:  Recent Labs     07/22/24  1051 04/23/24  1759 03/27/24  1504   WBC 5.7 5.8 6.2   NRBC 0.0 0.0 0.0   RBC 3.95* 3.27* 3.31*   HGB 10.5* 9.1* 9.6*   HCT 33.3* 28.3* 29.4*   MCV 84 87 89   MCH 26.6 27.8 29.0   MCHC 31.5* 32.2 32.7   RDW 18.3* 18.1* 17.7*    243 297     HgbA1c trend:  Recent Labs     05/19/25  1120 02/11/25  0857 10/30/24  1012   HGBA1C 7.2* 6.7* 6.5*     Lipid Panel Trend:   Recent Labs     07/22/24  1051 04/04/23  1041 01/31/23  0849   CHOL 169 134 159   HDL 34.4 30.6* 31.1*   LDLCALC 102*  --   --    LDLF  --  61 80   VLDL 33 43* 48*   TRIG 164* 213* 239*     Serum Vitamin D Trend:   No results for input(s): \"VITD25\" in the last 27002 hours.      Medications:  Current Outpatient Medications   Medication Instructions    albuterol (Ventolin HFA) 90 mcg/actuation inhaler 2 puffs, inhalation, Every 8 hours PRN    famotidine (PEPCID) 20 mg, oral, Nightly    fluticasone (Flonase) 50 mcg/actuation nasal spray 2 sprays, Each Nostril, Daily    glipiZIDE (GLUCOTROL) 5 mg, oral, Daily    ibuprofen (IBU) 600 mg, Every 6 hours PRN    magnesium oxide (Mag-Ox) 400 mg (241.3 mg magnesium) tablet 2 tablets, 2 times daily    polyethylene glycol (GAVILAX) 17 g, Daily PRN    tiotropium (Spiriva Respimat) 2.5 mcg/actuation inhaler 2 puffs, Daily        Estimated Needs:  Total Carbohydrate Estimated Needs (g):  (30-45gm carb per meal (2-3 carb servings or ~1 to 1.5 fist size); 15 gram carb (1 carb serving) per snack); Method for Estimating Needs: General recs for CHO portion for women trying to lose wt       Nutrition Diagnosis   Malnutrition Diagnosis  Patient has Malnutrition Diagnosis: No    Nutrition Diagnosis  Patient has Nutrition Diagnosis: Yes  Diagnosis Status (1): New  Nutrition Diagnosis 1: Food and nutrition related knowledge deficit  Related to (1): limited or lack of prior nutrition-related education  As " Evidenced by (1): per pt report looking for guidance on healthy eating patterns to help manage DM, unbalanced and inconsistent meals per diet recall       Nutrition Interventions/Recommendations   Nutrition Prescription: Oral nutrition Carb controlled diet with plate method for portion control for intended wt loss    Nutrition Interventions:   Food and Nutrient Delivery: Meals & Snacks: Carbohydrate-modified diet, Modification of schedule of oral intake, Modify composition of oral intake     Coordination of Care:       Nutrition Education:   Nutrition Education Content: Content related nutrition education  Reviewed basics of what is a carb and portion sizes of carb foods for meal planning; discussed foods that can be added to meals that will not raise blood sugar levels; reviewed normal glucose metabolism and what happens when insulin resistance or insufficient insulin secretion is present and how this can impact blood glucose levels; reviewed building balanced meals using plate method; discussed timing of meals to help nourish the body and improve blood glucose levels; reviewed adequate hydration from water or other low calorie beverages. Next visit would benefit from discussing more in-depth on carb counting, review reading labels for carb counting and general exercise recommendations.    Educational Handouts Provided: ADA Plate Method, [facility] Balanced Breakfast, High Protein Meal Ideas, High Protein Snack Ideas, and NCM CHO Counting for People With Diabetes      Nutrition Counseling:   Theoretical Basis/Approach and/or Counseling Strategies:   Nutrition Counseling Strategies : Nutrition counseling based on motivational interviewing strategy    Readiness to Change: Good   Level of Understanding: Good  Anticipated Compliance: As Able       Nutrition Monitoring and Evaluation   Meal/Snack Pattern: Estimated meal and snack pattern  Criteria: Monitor patient's progress towards meal and snack goal(s)    Body Weight:  Measured body weight  Criteria: Monitor patient's progress towards intentional weight loss of 0.5-2 lb per week, trending toward a clinically significant weight loss of 5-10% of current body weight.    Glucose/Endocrine Profile: Hemoglobin A1c (HgbA1c)  Criteria: <7%  Lipid Profile: Triglycerides, serum  Criteria: CHOL <200;  HDL 40-60;  LDL <70;  TG <150 mg/dL    Goal Status: New goal identified       Follow Up: 8/19/2025    Time Spent  Prep time on day of patient encounter: 5 minutes  Time spent directly with patient, family or caregiver: 59 minutes  Documentation Time: 5 minutes             [1]   Patient Active Problem List  Diagnosis    TIFFANY (acute kidney injury)    COPD (chronic obstructive pulmonary disease) (Multi)    Hypokalemia    Dyspnea    Hypomagnesemia    Malignant neoplasm of ovary    Nicotine addiction    Obstructive sleep apnea syndrome in adult    Abnormal blood chemistry    Abnormal chest CT    Bilateral shoulder pain    Depression    Headache    Hyperlipidemia    Lung nodule, multiple    Morbid obesity with BMI of 40.0-44.9, adult (Multi)    Pelvic floor dysfunction    Pelvic floor weakness    Right leg pain    Stress incontinence, female    Urge incontinence    Urine frequency    Encounter for antineoplastic chemotherapy    Stress incontinence (female) (male)    Other specified disorders of muscle    Cognitive communication deficit    Encounter for follow-up surveillance of ovarian cancer    Chemotherapy-induced neuropathy (Multi)    Carcinomatosis (Multi)    Diabetes mellitus without complication    Bilateral foot pain    Bilateral hand pain

## 2025-08-05 NOTE — PATIENT INSTRUCTIONS
Nutrition Topics Discussed today:   Provided education on carbohydrate (carb) counting. Discussed the following:  Foods and beverages that contribute carbohydrates (fruits, grains, legumes, milk, yogurt, starchy vegetables, sugar added to foods, sugar-sweetened beverages)  Foods that contribute no or minimal carbohydrates (protein, fats, non-starchy vegetables)  How to use portions of food that are 15 grams of carbohydrate to facilitate counting; Gave examples of portions  Advised patient to aim for 2-3 carb servings (30-45 grams of carbohydrate) per meal and 1 carb serving (15 grams of carbohydrate) per snack   Utilize the Plate Method at meals in order to balance the types and amounts of food on the plate.   Half of the plate full of non-starchy vegetables. These foods contribute very little carbohydrate to the plate, and they add fiber to the meal. Salad, carrots, bell peppers, zucchini, broccoli, cauliflower, asparagus, radishes, carrots and green beans are a few examples of these foods. They can be served cooked or raw.    One quarter of plate including protein foods. Examples can include meat, nuts, seeds, cheese, cottage cheese, nut butter, fish, seafood, tofu, and edamame.    One quarter of the plate including carbohydrate foods. These foods include grains, fruit, legumes, starchy vegetables, milk and yogurt. Aim to eat at least half of the daily grains as whole grains.    Discussed importance of consistent meal pattern   Discussed how eating consistently and balanced meals help improve satiety, boot metabolism and helps to improve blood glucose levels   Encouraged paitient to eat first meal of the day within 1-2hrs after waking up to help boost metabolism   Encouraged meals and snacks to be  throughout the day with no more than a 3-4hr gap in between to help boost metabolism      Patient Goal(s):   Work towards 3 balanced meals using the Plate Method and 1-3 balanced snacks as needed   Aim to space  meals and snacks every 3-4 hours throughout the day   Aim to limit carbohydrate portions to 1/4th plate and/or the size of 1 to 1.5 of fist     Follow Up: 8/19/2025

## 2025-08-15 ENCOUNTER — LAB (OUTPATIENT)
Dept: LAB | Facility: CLINIC | Age: 65
End: 2025-08-15
Payer: COMMERCIAL

## 2025-08-15 DIAGNOSIS — C56.9 MALIGNANT NEOPLASM OF OVARY, UNSPECIFIED LATERALITY (MULTI): ICD-10-CM

## 2025-08-15 DIAGNOSIS — G47.33 OSA (OBSTRUCTIVE SLEEP APNEA): ICD-10-CM

## 2025-08-15 DIAGNOSIS — Z13.228 SCREENING FOR ENDOCRINE, METABOLIC AND IMMUNITY DISORDER: ICD-10-CM

## 2025-08-15 DIAGNOSIS — Z13.0 SCREENING FOR ENDOCRINE, METABOLIC AND IMMUNITY DISORDER: ICD-10-CM

## 2025-08-15 DIAGNOSIS — Z13.6 SCREENING FOR CARDIOVASCULAR CONDITION: Primary | ICD-10-CM

## 2025-08-15 DIAGNOSIS — Z13.29 SCREENING FOR ENDOCRINE/METABOLIC/IMMUNITY DISORDERS: ICD-10-CM

## 2025-08-15 DIAGNOSIS — Z13.29 SCREENING FOR ENDOCRINE, METABOLIC AND IMMUNITY DISORDER: ICD-10-CM

## 2025-08-15 DIAGNOSIS — I10 HYPERTENSION, UNSPECIFIED TYPE: ICD-10-CM

## 2025-08-15 DIAGNOSIS — E08.69 DIABETES MELLITUS DUE TO UNDERLYING CONDITION WITH OTHER SPECIFIED COMPLICATION, UNSPECIFIED WHETHER LONG TERM INSULIN USE: ICD-10-CM

## 2025-08-15 DIAGNOSIS — Z13.0 SCREENING FOR ENDOCRINE/METABOLIC/IMMUNITY DISORDERS: ICD-10-CM

## 2025-08-15 DIAGNOSIS — Z13.0 SCREENING FOR DEFICIENCY ANEMIA: ICD-10-CM

## 2025-08-15 DIAGNOSIS — Z13.228 SCREENING FOR ENDOCRINE/METABOLIC/IMMUNITY DISORDERS: ICD-10-CM

## 2025-08-15 DIAGNOSIS — Z13.89 SCREENING FOR BLOOD OR PROTEIN IN URINE: ICD-10-CM

## 2025-08-15 LAB — CANCER AG125 SERPL-ACNC: 6.9 U/ML (ref 0–30.2)

## 2025-08-15 PROCEDURE — 86304 IMMUNOASSAY TUMOR CA 125: CPT

## 2025-08-15 PROCEDURE — 36415 COLL VENOUS BLD VENIPUNCTURE: CPT

## 2025-08-16 LAB
ALBUMIN SERPL-MCNC: 4.2 G/DL (ref 3.6–5.1)
ALBUMIN/CREAT UR: 2 MG/G CREAT
ALP SERPL-CCNC: 54 U/L (ref 37–153)
ALT SERPL-CCNC: 6 U/L (ref 6–29)
ANION GAP SERPL CALCULATED.4IONS-SCNC: 7 MMOL/L (CALC) (ref 7–17)
APPEARANCE UR: CLEAR
AST SERPL-CCNC: 11 U/L (ref 10–35)
BACTERIA #/AREA URNS HPF: ABNORMAL /HPF
BASOPHILS # BLD AUTO: 22 CELLS/UL (ref 0–200)
BASOPHILS NFR BLD AUTO: 0.4 %
BILIRUB SERPL-MCNC: 0.6 MG/DL (ref 0.2–1.2)
BILIRUB UR QL STRIP: NEGATIVE
BUN SERPL-MCNC: 11 MG/DL (ref 7–25)
CALCIUM SERPL-MCNC: 9.1 MG/DL (ref 8.6–10.4)
CHLORIDE SERPL-SCNC: 105 MMOL/L (ref 98–110)
CHOLEST SERPL-MCNC: 146 MG/DL
CHOLEST/HDLC SERPL: 3.4 (CALC)
CO2 SERPL-SCNC: 28 MMOL/L (ref 20–32)
COLOR UR: YELLOW
CREAT SERPL-MCNC: 0.82 MG/DL (ref 0.5–1.05)
CREAT UR-MCNC: 89 MG/DL (ref 20–275)
EGFRCR SERPLBLD CKD-EPI 2021: 79 ML/MIN/1.73M2
EOSINOPHIL # BLD AUTO: 121 CELLS/UL (ref 15–500)
EOSINOPHIL NFR BLD AUTO: 2.2 %
ERYTHROCYTE [DISTWIDTH] IN BLOOD BY AUTOMATED COUNT: 17.7 % (ref 11–15)
EST. AVERAGE GLUCOSE BLD GHB EST-MCNC: 154 MG/DL
EST. AVERAGE GLUCOSE BLD GHB EST-SCNC: 8.5 MMOL/L
GLUCOSE SERPL-MCNC: 102 MG/DL (ref 65–99)
GLUCOSE UR QL STRIP: NEGATIVE
HBA1C MFR BLD: 7 %
HCT VFR BLD AUTO: 39.9 % (ref 35–45)
HDLC SERPL-MCNC: 43 MG/DL
HGB BLD-MCNC: 12.5 G/DL (ref 11.7–15.5)
HGB UR QL STRIP: NEGATIVE
HYALINE CASTS #/AREA URNS LPF: ABNORMAL /LPF
KETONES UR QL STRIP: NEGATIVE
LDLC SERPL CALC-MCNC: 82 MG/DL (CALC)
LEUKOCYTE ESTERASE UR QL STRIP: ABNORMAL
LYMPHOCYTES # BLD AUTO: 2684 CELLS/UL (ref 850–3900)
LYMPHOCYTES NFR BLD AUTO: 48.8 %
MCH RBC QN AUTO: 27.2 PG (ref 27–33)
MCHC RBC AUTO-ENTMCNC: 31.3 G/DL (ref 32–36)
MCV RBC AUTO: 86.9 FL (ref 80–100)
MICROALBUMIN UR-MCNC: 0.2 MG/DL
MONOCYTES # BLD AUTO: 347 CELLS/UL (ref 200–950)
MONOCYTES NFR BLD AUTO: 6.3 %
NEUTROPHILS # BLD AUTO: 2327 CELLS/UL (ref 1500–7800)
NEUTROPHILS NFR BLD AUTO: 42.3 %
NITRITE UR QL STRIP: POSITIVE
NONHDLC SERPL-MCNC: 103 MG/DL (CALC)
PH UR STRIP: 7 [PH] (ref 5–8)
PLATELET # BLD AUTO: 238 THOUSAND/UL (ref 140–400)
PMV BLD REES-ECKER: 9.6 FL (ref 7.5–12.5)
POTASSIUM SERPL-SCNC: 4.5 MMOL/L (ref 3.5–5.3)
PROT SERPL-MCNC: 7.2 G/DL (ref 6.1–8.1)
PROT UR QL STRIP: NEGATIVE
RBC # BLD AUTO: 4.59 MILLION/UL (ref 3.8–5.1)
RBC #/AREA URNS HPF: ABNORMAL /HPF
SERVICE CMNT-IMP: ABNORMAL
SODIUM SERPL-SCNC: 140 MMOL/L (ref 135–146)
SP GR UR STRIP: 1.01 (ref 1–1.03)
SQUAMOUS #/AREA URNS HPF: ABNORMAL /HPF
TRIGL SERPL-MCNC: 109 MG/DL
TSH SERPL-ACNC: 0.96 MIU/L (ref 0.4–4.5)
WBC # BLD AUTO: 5.5 THOUSAND/UL (ref 3.8–10.8)
WBC #/AREA URNS HPF: ABNORMAL /HPF

## 2025-08-19 ENCOUNTER — APPOINTMENT (OUTPATIENT)
Dept: NUTRITION | Facility: HOSPITAL | Age: 65
End: 2025-08-19
Payer: COMMERCIAL

## 2025-08-19 ENCOUNTER — APPOINTMENT (OUTPATIENT)
Dept: PRIMARY CARE | Facility: CLINIC | Age: 65
End: 2025-08-19
Payer: COMMERCIAL

## 2025-08-19 VITALS
RESPIRATION RATE: 16 BRPM | OXYGEN SATURATION: 98 % | DIASTOLIC BLOOD PRESSURE: 72 MMHG | BODY MASS INDEX: 40.48 KG/M2 | HEART RATE: 86 BPM | SYSTOLIC BLOOD PRESSURE: 124 MMHG | WEIGHT: 243 LBS | HEIGHT: 65 IN

## 2025-08-19 DIAGNOSIS — E08.69 DIABETES MELLITUS DUE TO UNDERLYING CONDITION WITH OTHER SPECIFIED COMPLICATION, UNSPECIFIED WHETHER LONG TERM INSULIN USE: ICD-10-CM

## 2025-08-19 DIAGNOSIS — R79.9 ABNORMAL BLOOD CHEMISTRY: ICD-10-CM

## 2025-08-19 DIAGNOSIS — Z00.00 MEDICARE ANNUAL WELLNESS VISIT, SUBSEQUENT: Primary | ICD-10-CM

## 2025-08-19 DIAGNOSIS — Z13.0 SCREENING FOR ENDOCRINE, METABOLIC AND IMMUNITY DISORDER: ICD-10-CM

## 2025-08-19 DIAGNOSIS — Z13.29 SCREENING FOR ENDOCRINE, METABOLIC AND IMMUNITY DISORDER: ICD-10-CM

## 2025-08-19 DIAGNOSIS — Z12.31 SCREENING MAMMOGRAM FOR BREAST CANCER: ICD-10-CM

## 2025-08-19 DIAGNOSIS — R82.90 ABNORMAL URINE FINDINGS: ICD-10-CM

## 2025-08-19 DIAGNOSIS — Z00.00 HEALTHCARE MAINTENANCE: ICD-10-CM

## 2025-08-19 DIAGNOSIS — Z13.228 SCREENING FOR ENDOCRINE, METABOLIC AND IMMUNITY DISORDER: ICD-10-CM

## 2025-08-19 PROCEDURE — 99397 PER PM REEVAL EST PAT 65+ YR: CPT | Performed by: INTERNAL MEDICINE

## 2025-08-19 PROCEDURE — 99214 OFFICE O/P EST MOD 30 MIN: CPT | Performed by: INTERNAL MEDICINE

## 2025-08-19 PROCEDURE — 1170F FXNL STATUS ASSESSED: CPT | Performed by: INTERNAL MEDICINE

## 2025-08-19 PROCEDURE — 3008F BODY MASS INDEX DOCD: CPT | Performed by: INTERNAL MEDICINE

## 2025-08-19 PROCEDURE — G0439 PPPS, SUBSEQ VISIT: HCPCS | Performed by: INTERNAL MEDICINE

## 2025-08-19 PROCEDURE — 3078F DIAST BP <80 MM HG: CPT | Performed by: INTERNAL MEDICINE

## 2025-08-19 PROCEDURE — 1124F ACP DISCUSS-NO DSCNMKR DOCD: CPT | Performed by: INTERNAL MEDICINE

## 2025-08-19 PROCEDURE — 3074F SYST BP LT 130 MM HG: CPT | Performed by: INTERNAL MEDICINE

## 2025-08-19 PROCEDURE — 1159F MED LIST DOCD IN RCRD: CPT | Performed by: INTERNAL MEDICINE

## 2025-08-19 ASSESSMENT — ACTIVITIES OF DAILY LIVING (ADL)
BATHING: INDEPENDENT
GROCERY_SHOPPING: INDEPENDENT
DOING_HOUSEWORK: INDEPENDENT
DRESSING: INDEPENDENT
TAKING_MEDICATION: INDEPENDENT
MANAGING_FINANCES: NEEDS ASSISTANCE

## 2025-08-19 ASSESSMENT — PATIENT HEALTH QUESTIONNAIRE - PHQ9
2. FEELING DOWN, DEPRESSED OR HOPELESS: NOT AT ALL
SUM OF ALL RESPONSES TO PHQ9 QUESTIONS 1 AND 2: 0
1. LITTLE INTEREST OR PLEASURE IN DOING THINGS: NOT AT ALL

## 2025-08-20 ENCOUNTER — TELEMEDICINE CLINICAL SUPPORT (OUTPATIENT)
Dept: CARDIAC REHAB | Facility: CLINIC | Age: 65
End: 2025-08-20
Payer: COMMERCIAL

## 2025-08-21 ENCOUNTER — OFFICE VISIT (OUTPATIENT)
Dept: GYNECOLOGIC ONCOLOGY | Facility: CLINIC | Age: 65
End: 2025-08-21
Payer: COMMERCIAL

## 2025-08-21 VITALS
DIASTOLIC BLOOD PRESSURE: 73 MMHG | SYSTOLIC BLOOD PRESSURE: 113 MMHG | HEART RATE: 90 BPM | RESPIRATION RATE: 18 BRPM | BODY MASS INDEX: 41.1 KG/M2 | TEMPERATURE: 97.5 F | WEIGHT: 247 LBS | OXYGEN SATURATION: 96 %

## 2025-08-21 DIAGNOSIS — T45.1X5A CHEMOTHERAPY-INDUCED NEUROPATHY (MULTI): ICD-10-CM

## 2025-08-21 DIAGNOSIS — G62.0 CHEMOTHERAPY-INDUCED NEUROPATHY (MULTI): ICD-10-CM

## 2025-08-21 DIAGNOSIS — C56.3 MALIGNANT NEOPLASM OF BOTH OVARIES (MULTI): Primary | ICD-10-CM

## 2025-08-21 DIAGNOSIS — Z85.43 ENCOUNTER FOR FOLLOW-UP SURVEILLANCE OF OVARIAN CANCER: ICD-10-CM

## 2025-08-21 DIAGNOSIS — Z08 ENCOUNTER FOR FOLLOW-UP SURVEILLANCE OF OVARIAN CANCER: ICD-10-CM

## 2025-08-21 PROCEDURE — 99214 OFFICE O/P EST MOD 30 MIN: CPT | Performed by: STUDENT IN AN ORGANIZED HEALTH CARE EDUCATION/TRAINING PROGRAM

## 2025-08-21 PROCEDURE — G2211 COMPLEX E/M VISIT ADD ON: HCPCS | Performed by: STUDENT IN AN ORGANIZED HEALTH CARE EDUCATION/TRAINING PROGRAM

## 2025-08-21 PROCEDURE — 3078F DIAST BP <80 MM HG: CPT | Performed by: STUDENT IN AN ORGANIZED HEALTH CARE EDUCATION/TRAINING PROGRAM

## 2025-08-21 PROCEDURE — 3074F SYST BP LT 130 MM HG: CPT | Performed by: STUDENT IN AN ORGANIZED HEALTH CARE EDUCATION/TRAINING PROGRAM

## 2025-08-21 PROCEDURE — 1160F RVW MEDS BY RX/DR IN RCRD: CPT | Performed by: STUDENT IN AN ORGANIZED HEALTH CARE EDUCATION/TRAINING PROGRAM

## 2025-08-21 PROCEDURE — 1159F MED LIST DOCD IN RCRD: CPT | Performed by: STUDENT IN AN ORGANIZED HEALTH CARE EDUCATION/TRAINING PROGRAM

## 2025-08-21 PROCEDURE — 1126F AMNT PAIN NOTED NONE PRSNT: CPT | Performed by: STUDENT IN AN ORGANIZED HEALTH CARE EDUCATION/TRAINING PROGRAM

## 2025-08-21 ASSESSMENT — PAIN SCALES - GENERAL: PAINLEVEL_OUTOF10: 0-NO PAIN

## 2025-08-27 ENCOUNTER — NUTRITION (OUTPATIENT)
Dept: NUTRITION | Facility: HOSPITAL | Age: 65
End: 2025-08-27
Payer: COMMERCIAL

## 2025-08-27 ENCOUNTER — APPOINTMENT (OUTPATIENT)
Dept: INTEGRATIVE MEDICINE | Facility: CLINIC | Age: 65
End: 2025-08-27
Payer: COMMERCIAL

## 2025-08-27 DIAGNOSIS — E11.9 DIABETES MELLITUS WITHOUT COMPLICATION: Primary | ICD-10-CM

## 2025-08-27 DIAGNOSIS — M54.59 OTHER LOW BACK PAIN: Primary | ICD-10-CM

## 2025-08-27 PROCEDURE — 97810 ACUP 1/> WO ESTIM 1ST 15 MIN: CPT | Performed by: NATUROPATH

## 2025-08-27 PROCEDURE — 97811 ACUP 1/> W/O ESTIM EA ADD 15: CPT | Performed by: NATUROPATH

## 2025-08-27 PROCEDURE — 97803 MED NUTRITION INDIV SUBSEQ: CPT

## 2025-09-17 ENCOUNTER — APPOINTMENT (OUTPATIENT)
Dept: INTEGRATIVE MEDICINE | Facility: CLINIC | Age: 65
End: 2025-09-17
Payer: COMMERCIAL

## 2025-10-01 ENCOUNTER — APPOINTMENT (OUTPATIENT)
Dept: INTEGRATIVE MEDICINE | Facility: CLINIC | Age: 65
End: 2025-10-01
Payer: COMMERCIAL

## 2025-10-02 ENCOUNTER — APPOINTMENT (OUTPATIENT)
Dept: OPHTHALMOLOGY | Facility: CLINIC | Age: 65
End: 2025-10-02
Payer: COMMERCIAL

## 2025-10-15 ENCOUNTER — APPOINTMENT (OUTPATIENT)
Dept: INTEGRATIVE MEDICINE | Facility: CLINIC | Age: 65
End: 2025-10-15
Payer: COMMERCIAL

## 2025-10-29 ENCOUNTER — APPOINTMENT (OUTPATIENT)
Dept: INTEGRATIVE MEDICINE | Facility: CLINIC | Age: 65
End: 2025-10-29
Payer: COMMERCIAL

## 2025-11-05 ENCOUNTER — APPOINTMENT (OUTPATIENT)
Dept: PRIMARY CARE | Facility: CLINIC | Age: 65
End: 2025-11-05
Payer: COMMERCIAL